# Patient Record
Sex: MALE | Race: WHITE | Employment: OTHER | URBAN - METROPOLITAN AREA
[De-identification: names, ages, dates, MRNs, and addresses within clinical notes are randomized per-mention and may not be internally consistent; named-entity substitution may affect disease eponyms.]

---

## 2017-01-30 RX ORDER — ATORVASTATIN CALCIUM 40 MG/1
TABLET, FILM COATED ORAL
Qty: 30 TABLET | Refills: 2 | Status: SHIPPED | OUTPATIENT
Start: 2017-01-30 | End: 2017-02-14 | Stop reason: SDUPTHER

## 2017-02-14 ENCOUNTER — OFFICE VISIT (OUTPATIENT)
Dept: FAMILY MEDICINE CLINIC | Age: 67
End: 2017-02-14

## 2017-02-14 VITALS
OXYGEN SATURATION: 97 % | BODY MASS INDEX: 32.49 KG/M2 | SYSTOLIC BLOOD PRESSURE: 102 MMHG | WEIGHT: 207 LBS | DIASTOLIC BLOOD PRESSURE: 60 MMHG | HEART RATE: 73 BPM | HEIGHT: 67 IN

## 2017-02-14 DIAGNOSIS — R05.8 ACE-INHIBITOR COUGH: ICD-10-CM

## 2017-02-14 DIAGNOSIS — E11.319 DIABETIC RETINOPATHY ASSOCIATED WITH TYPE 2 DIABETES MELLITUS, MACULAR EDEMA PRESENCE UNSPECIFIED, UNSPECIFIED RETINOPATHY SEVERITY: ICD-10-CM

## 2017-02-14 DIAGNOSIS — E11.8 UNCONTROLLED TYPE 2 DIABETES MELLITUS WITH COMPLICATION, UNSPECIFIED LONG TERM INSULIN USE STATUS: Primary | ICD-10-CM

## 2017-02-14 DIAGNOSIS — E11.65 UNCONTROLLED TYPE 2 DIABETES MELLITUS WITH COMPLICATION, UNSPECIFIED LONG TERM INSULIN USE STATUS: Primary | ICD-10-CM

## 2017-02-14 DIAGNOSIS — T46.4X5A ACE-INHIBITOR COUGH: ICD-10-CM

## 2017-02-14 DIAGNOSIS — Z11.59 NEED FOR HEPATITIS C SCREENING TEST: ICD-10-CM

## 2017-02-14 LAB
A/G RATIO: 2.2 (ref 1.1–2.2)
ALBUMIN SERPL-MCNC: 4.7 G/DL (ref 3.4–5)
ALP BLD-CCNC: 97 U/L (ref 40–129)
ALT SERPL-CCNC: 40 U/L (ref 10–40)
ANION GAP SERPL CALCULATED.3IONS-SCNC: 18 MMOL/L (ref 3–16)
AST SERPL-CCNC: 33 U/L (ref 15–37)
BILIRUB SERPL-MCNC: 0.6 MG/DL (ref 0–1)
BUN BLDV-MCNC: 12 MG/DL (ref 7–20)
CALCIUM SERPL-MCNC: 9.4 MG/DL (ref 8.3–10.6)
CHLORIDE BLD-SCNC: 99 MMOL/L (ref 99–110)
CHOLESTEROL, TOTAL: 197 MG/DL (ref 0–199)
CO2: 25 MMOL/L (ref 21–32)
CREAT SERPL-MCNC: 0.8 MG/DL (ref 0.8–1.3)
CREATININE URINE POCT: 100
GFR AFRICAN AMERICAN: >60
GFR NON-AFRICAN AMERICAN: >60
GLOBULIN: 2.1 G/DL
GLUCOSE BLD-MCNC: 170 MG/DL (ref 70–99)
HBA1C MFR BLD: 9.2 %
HDLC SERPL-MCNC: 56 MG/DL (ref 40–60)
HEPATITIS C ANTIBODY INTERPRETATION: NORMAL
LDL CHOLESTEROL CALCULATED: 104 MG/DL
MICROALBUMIN/CREAT 24H UR: 10 MG/G{CREAT}
POTASSIUM SERPL-SCNC: 4.3 MMOL/L (ref 3.5–5.1)
SODIUM BLD-SCNC: 142 MMOL/L (ref 136–145)
TOTAL PROTEIN: 6.8 G/DL (ref 6.4–8.2)
TRIGL SERPL-MCNC: 187 MG/DL (ref 0–150)
VLDLC SERPL CALC-MCNC: 37 MG/DL

## 2017-02-14 PROCEDURE — 4040F PNEUMOC VAC/ADMIN/RCVD: CPT | Performed by: FAMILY MEDICINE

## 2017-02-14 PROCEDURE — 99214 OFFICE O/P EST MOD 30 MIN: CPT | Performed by: FAMILY MEDICINE

## 2017-02-14 PROCEDURE — 82044 UR ALBUMIN SEMIQUANTITATIVE: CPT | Performed by: FAMILY MEDICINE

## 2017-02-14 PROCEDURE — 3046F HEMOGLOBIN A1C LEVEL >9.0%: CPT | Performed by: FAMILY MEDICINE

## 2017-02-14 PROCEDURE — 83036 HEMOGLOBIN GLYCOSYLATED A1C: CPT | Performed by: FAMILY MEDICINE

## 2017-02-14 PROCEDURE — 1123F ACP DISCUSS/DSCN MKR DOCD: CPT | Performed by: FAMILY MEDICINE

## 2017-02-14 PROCEDURE — 36415 COLL VENOUS BLD VENIPUNCTURE: CPT | Performed by: FAMILY MEDICINE

## 2017-02-14 PROCEDURE — G8427 DOCREV CUR MEDS BY ELIG CLIN: HCPCS | Performed by: FAMILY MEDICINE

## 2017-02-14 PROCEDURE — G8417 CALC BMI ABV UP PARAM F/U: HCPCS | Performed by: FAMILY MEDICINE

## 2017-02-14 PROCEDURE — 3017F COLORECTAL CA SCREEN DOC REV: CPT | Performed by: FAMILY MEDICINE

## 2017-02-14 PROCEDURE — G8484 FLU IMMUNIZE NO ADMIN: HCPCS | Performed by: FAMILY MEDICINE

## 2017-02-14 PROCEDURE — 1036F TOBACCO NON-USER: CPT | Performed by: FAMILY MEDICINE

## 2017-02-14 RX ORDER — FLUTICASONE PROPIONATE 50 MCG
1 SPRAY, SUSPENSION (ML) NASAL DAILY
Qty: 1 BOTTLE | Refills: 3 | Status: SHIPPED | OUTPATIENT
Start: 2017-02-14 | End: 2018-03-01 | Stop reason: SDUPTHER

## 2017-02-14 RX ORDER — LISINOPRIL 10 MG/1
10 TABLET ORAL DAILY
Qty: 90 TABLET | Refills: 3 | Status: SHIPPED | OUTPATIENT
Start: 2017-02-14 | End: 2017-02-14

## 2017-02-14 RX ORDER — TADALAFIL 5 MG/1
5 TABLET ORAL DAILY PRN
Qty: 30 TABLET | Refills: 4 | Status: SHIPPED | OUTPATIENT
Start: 2017-02-14 | End: 2019-10-21 | Stop reason: SDUPTHER

## 2017-02-14 RX ORDER — ATORVASTATIN CALCIUM 40 MG/1
TABLET, FILM COATED ORAL
Qty: 30 TABLET | Refills: 5 | Status: SHIPPED | OUTPATIENT
Start: 2017-02-14 | End: 2017-08-10 | Stop reason: SDUPTHER

## 2017-02-14 RX ORDER — LOSARTAN POTASSIUM 25 MG/1
25 TABLET ORAL DAILY
Qty: 90 TABLET | Refills: 1 | Status: SHIPPED | OUTPATIENT
Start: 2017-02-14 | End: 2017-08-10 | Stop reason: SDUPTHER

## 2017-02-14 RX ORDER — GLIMEPIRIDE 4 MG/1
TABLET ORAL
Qty: 180 TABLET | Refills: 1 | Status: SHIPPED | OUTPATIENT
Start: 2017-02-14 | End: 2017-04-24

## 2017-02-14 ASSESSMENT — ENCOUNTER SYMPTOMS
HEARTBURN: 0
SHORTNESS OF BREATH: 0
WHEEZING: 0
COUGH: 1

## 2017-04-24 ENCOUNTER — TELEPHONE (OUTPATIENT)
Dept: FAMILY MEDICINE CLINIC | Age: 67
End: 2017-04-24

## 2017-04-24 ENCOUNTER — OFFICE VISIT (OUTPATIENT)
Dept: FAMILY MEDICINE CLINIC | Age: 67
End: 2017-04-24

## 2017-04-24 ENCOUNTER — PATIENT MESSAGE (OUTPATIENT)
Dept: FAMILY MEDICINE CLINIC | Age: 67
End: 2017-04-24

## 2017-04-24 VITALS
SYSTOLIC BLOOD PRESSURE: 110 MMHG | OXYGEN SATURATION: 96 % | DIASTOLIC BLOOD PRESSURE: 70 MMHG | HEIGHT: 67 IN | WEIGHT: 208 LBS | BODY MASS INDEX: 32.65 KG/M2 | HEART RATE: 72 BPM

## 2017-04-24 DIAGNOSIS — R97.20 ELEVATED PSA: ICD-10-CM

## 2017-04-24 DIAGNOSIS — Z12.11 COLON CANCER SCREENING: ICD-10-CM

## 2017-04-24 DIAGNOSIS — R05.3 CHRONIC COUGH: ICD-10-CM

## 2017-04-24 LAB — HBA1C MFR BLD: 9.8 %

## 2017-04-24 PROCEDURE — G8427 DOCREV CUR MEDS BY ELIG CLIN: HCPCS | Performed by: FAMILY MEDICINE

## 2017-04-24 PROCEDURE — 4040F PNEUMOC VAC/ADMIN/RCVD: CPT | Performed by: FAMILY MEDICINE

## 2017-04-24 PROCEDURE — 83036 HEMOGLOBIN GLYCOSYLATED A1C: CPT | Performed by: FAMILY MEDICINE

## 2017-04-24 PROCEDURE — 3046F HEMOGLOBIN A1C LEVEL >9.0%: CPT | Performed by: FAMILY MEDICINE

## 2017-04-24 PROCEDURE — 3017F COLORECTAL CA SCREEN DOC REV: CPT | Performed by: FAMILY MEDICINE

## 2017-04-24 PROCEDURE — 1123F ACP DISCUSS/DSCN MKR DOCD: CPT | Performed by: FAMILY MEDICINE

## 2017-04-24 PROCEDURE — G8417 CALC BMI ABV UP PARAM F/U: HCPCS | Performed by: FAMILY MEDICINE

## 2017-04-24 PROCEDURE — 1036F TOBACCO NON-USER: CPT | Performed by: FAMILY MEDICINE

## 2017-04-24 PROCEDURE — 99214 OFFICE O/P EST MOD 30 MIN: CPT | Performed by: FAMILY MEDICINE

## 2017-04-24 RX ORDER — GLUCOSAMINE HCL/CHONDROITIN SU 500-400 MG
CAPSULE ORAL
Qty: 100 STRIP | Refills: 3 | Status: SHIPPED | OUTPATIENT
Start: 2017-04-24 | End: 2017-04-24 | Stop reason: SDUPTHER

## 2017-04-24 RX ORDER — GLUCOSAMINE HCL/CHONDROITIN SU 500-400 MG
CAPSULE ORAL
Qty: 150 STRIP | Refills: 3 | Status: SHIPPED | OUTPATIENT
Start: 2017-04-24 | End: 2018-11-15 | Stop reason: SDUPTHER

## 2017-04-24 RX ORDER — LANCETS 30 GAUGE
1 EACH MISCELLANEOUS 4 TIMES DAILY
Qty: 150 EACH | Refills: 3 | Status: SHIPPED | OUTPATIENT
Start: 2017-04-24 | End: 2018-11-15 | Stop reason: SDUPTHER

## 2017-04-24 RX ORDER — LANCETS 30 GAUGE
EACH MISCELLANEOUS
Qty: 100 EACH | Refills: 3 | Status: SHIPPED | OUTPATIENT
Start: 2017-04-24 | End: 2017-04-24 | Stop reason: SDUPTHER

## 2017-04-24 ASSESSMENT — ENCOUNTER SYMPTOMS
VISUAL CHANGE: 0
COUGH: 1
SHORTNESS OF BREATH: 0

## 2017-04-26 LAB
PROSTATE SPECIFIC ANTIGEN FREE: 1.3 NG/ML
PROSTATE SPECIFIC ANTIGEN PERCENT FREE: 18 %
PROSTATE SPECIFIC ANTIGEN: 7.4 NG/ML (ref 0–4)

## 2017-04-29 LAB — ALLERGEN SEE NOTE: NORMAL

## 2017-04-30 LAB
2000687N OAK TREE IGE: 0.45 KU/L
ALLERGEN ASPERGILLUS ALTERNATA IGE: <0.1 KU/L
ALLERGEN ASPERGILLUS FUMIGATUS IGE: <0.1 KU/L
ALLERGEN BERMUDA GRASS IGE: 0.43 KU/L
ALLERGEN BIRCH IGE: 0.31 KU/L
ALLERGEN CAT DANDER IGE: <0.1 KU/L
ALLERGEN COMMON SHORT RAGWEED IGE: 0.39 KU/L
ALLERGEN COTTONWOOD: 0.5 KU/L
ALLERGEN COW MILK IGE: <0.1 KU/L
ALLERGEN DOG DANDER IGE: <0.1 KU/L
ALLERGEN ELM IGE: 0.39 KU/L
ALLERGEN FUNGI/MOLD M.RACEMOSUS IGE: 0.19 KU/L
ALLERGEN GERMAN COCKROACH IGE: 0.22 KU/L
ALLERGEN HORMODENDRUM HORDEI IGE: <0.1 KU/L
ALLERGEN MAPLE/BOX ELDER IGE: 0.45 KU/L
ALLERGEN MITE DUST FARINAE IGE: <0.1 KU/L
ALLERGEN MITE DUST PTERONYSSINUS IGE: <0.1 KU/L
ALLERGEN MOUNTAIN CEDAR: 0.33 KU/L
ALLERGEN MOUSE EPITHELIA IGE: <0.1 KU/L
ALLERGEN PEANUT (F13) IGE: 0.36 KU/L
ALLERGEN PECAN TREE IGE: 0.31 KU/L
ALLERGEN PENICILLIUM NOTATUM: <0.1 KU/L
ALLERGEN ROUGH PIGWEED (W14) IGE: 0.3 KU/L
ALLERGEN RUSSIAN THISTLE IGE: 0.39 KU/L
ALLERGEN SHEEP SORREL (W18) IGE: 0.38 KU/L
ALLERGEN TIMOTHY GRASS: 0.43 KU/L
ALLERGEN TREE SYCAMORE: 0.42 KU/L
ALLERGEN WALNUT TREE IGE: 0.43 KU/L
ALLERGEN WHITE MULBERRY TREE, IGE: 0.31 KU/L
ALLERGEN, TREE, WHITE ASH IGE: 0.44 KU/L
IGE: 27 KU/L

## 2017-05-14 RX ORDER — RANITIDINE 150 MG/1
TABLET ORAL
Qty: 180 TABLET | Refills: 3 | Status: SHIPPED | OUTPATIENT
Start: 2017-05-14 | End: 2018-05-24 | Stop reason: SDUPTHER

## 2017-06-07 ENCOUNTER — PATIENT MESSAGE (OUTPATIENT)
Dept: FAMILY MEDICINE CLINIC | Age: 67
End: 2017-06-07

## 2017-06-07 RX ORDER — BLOOD-GLUCOSE METER
1 KIT MISCELLANEOUS DAILY
Qty: 1 KIT | Refills: 0 | Status: SHIPPED | OUTPATIENT
Start: 2017-06-07 | End: 2021-04-29 | Stop reason: SDUPTHER

## 2017-07-18 ENCOUNTER — OFFICE VISIT (OUTPATIENT)
Dept: ENT CLINIC | Age: 67
End: 2017-07-18

## 2017-07-18 VITALS
DIASTOLIC BLOOD PRESSURE: 74 MMHG | BODY MASS INDEX: 34.59 KG/M2 | WEIGHT: 220.4 LBS | SYSTOLIC BLOOD PRESSURE: 132 MMHG | HEIGHT: 67 IN | TEMPERATURE: 98.3 F

## 2017-07-18 DIAGNOSIS — H90.5 HEARING LOSS, SENSORINEURAL: Primary | ICD-10-CM

## 2017-07-18 PROCEDURE — 1036F TOBACCO NON-USER: CPT | Performed by: OTOLARYNGOLOGY

## 2017-07-18 PROCEDURE — 1123F ACP DISCUSS/DSCN MKR DOCD: CPT | Performed by: OTOLARYNGOLOGY

## 2017-07-18 PROCEDURE — G8427 DOCREV CUR MEDS BY ELIG CLIN: HCPCS | Performed by: OTOLARYNGOLOGY

## 2017-07-18 PROCEDURE — 4040F PNEUMOC VAC/ADMIN/RCVD: CPT | Performed by: OTOLARYNGOLOGY

## 2017-07-18 PROCEDURE — 99203 OFFICE O/P NEW LOW 30 MIN: CPT | Performed by: OTOLARYNGOLOGY

## 2017-07-18 PROCEDURE — 3017F COLORECTAL CA SCREEN DOC REV: CPT | Performed by: OTOLARYNGOLOGY

## 2017-07-18 PROCEDURE — G8417 CALC BMI ABV UP PARAM F/U: HCPCS | Performed by: OTOLARYNGOLOGY

## 2017-08-10 ENCOUNTER — OFFICE VISIT (OUTPATIENT)
Dept: FAMILY MEDICINE CLINIC | Age: 67
End: 2017-08-10

## 2017-08-10 VITALS
SYSTOLIC BLOOD PRESSURE: 120 MMHG | HEART RATE: 69 BPM | BODY MASS INDEX: 33.12 KG/M2 | HEIGHT: 67 IN | OXYGEN SATURATION: 97 % | DIASTOLIC BLOOD PRESSURE: 80 MMHG | WEIGHT: 211 LBS

## 2017-08-10 DIAGNOSIS — E11.65 UNCONTROLLED TYPE 2 DIABETES MELLITUS WITH COMPLICATION, UNSPECIFIED LONG TERM INSULIN USE STATUS: Primary | ICD-10-CM

## 2017-08-10 DIAGNOSIS — Z12.11 SCREENING FOR COLON CANCER: ICD-10-CM

## 2017-08-10 DIAGNOSIS — E11.8 UNCONTROLLED TYPE 2 DIABETES MELLITUS WITH COMPLICATION, UNSPECIFIED LONG TERM INSULIN USE STATUS: Primary | ICD-10-CM

## 2017-08-10 LAB — HBA1C MFR BLD: 9 %

## 2017-08-10 PROCEDURE — 3046F HEMOGLOBIN A1C LEVEL >9.0%: CPT | Performed by: FAMILY MEDICINE

## 2017-08-10 PROCEDURE — 3017F COLORECTAL CA SCREEN DOC REV: CPT | Performed by: FAMILY MEDICINE

## 2017-08-10 PROCEDURE — 1036F TOBACCO NON-USER: CPT | Performed by: FAMILY MEDICINE

## 2017-08-10 PROCEDURE — 4040F PNEUMOC VAC/ADMIN/RCVD: CPT | Performed by: FAMILY MEDICINE

## 2017-08-10 PROCEDURE — 99214 OFFICE O/P EST MOD 30 MIN: CPT | Performed by: FAMILY MEDICINE

## 2017-08-10 PROCEDURE — 83036 HEMOGLOBIN GLYCOSYLATED A1C: CPT | Performed by: FAMILY MEDICINE

## 2017-08-10 PROCEDURE — G8417 CALC BMI ABV UP PARAM F/U: HCPCS | Performed by: FAMILY MEDICINE

## 2017-08-10 PROCEDURE — 1123F ACP DISCUSS/DSCN MKR DOCD: CPT | Performed by: FAMILY MEDICINE

## 2017-08-10 PROCEDURE — G8427 DOCREV CUR MEDS BY ELIG CLIN: HCPCS | Performed by: FAMILY MEDICINE

## 2017-08-10 RX ORDER — LOSARTAN POTASSIUM 25 MG/1
25 TABLET ORAL DAILY
Qty: 90 TABLET | Refills: 1 | Status: SHIPPED | OUTPATIENT
Start: 2017-08-10 | End: 2018-03-01 | Stop reason: SDUPTHER

## 2017-08-10 RX ORDER — ACARBOSE 100 MG/1
100 TABLET ORAL
Qty: 90 TABLET | Refills: 3 | Status: SHIPPED | OUTPATIENT
Start: 2017-08-10 | End: 2018-03-01 | Stop reason: SDUPTHER

## 2017-08-10 RX ORDER — ATORVASTATIN CALCIUM 40 MG/1
TABLET, FILM COATED ORAL
Qty: 90 TABLET | Refills: 1 | Status: SHIPPED | OUTPATIENT
Start: 2017-08-10 | End: 2018-02-23 | Stop reason: SDUPTHER

## 2017-08-10 RX ORDER — ATORVASTATIN CALCIUM 40 MG/1
TABLET, FILM COATED ORAL
Qty: 90 TABLET | Refills: 1 | Status: SHIPPED | OUTPATIENT
Start: 2017-08-10 | End: 2017-08-10 | Stop reason: SDUPTHER

## 2017-08-10 RX ORDER — GLIMEPIRIDE 4 MG/1
TABLET ORAL
Qty: 180 TABLET | Refills: 1 | Status: SHIPPED | OUTPATIENT
Start: 2017-08-10 | End: 2018-05-24 | Stop reason: SDUPTHER

## 2017-08-10 ASSESSMENT — ENCOUNTER SYMPTOMS: NAUSEA: 0

## 2017-08-22 DIAGNOSIS — Z12.11 SCREENING FOR COLON CANCER: Primary | ICD-10-CM

## 2017-08-22 LAB
CONTROL: PRESENT
HEMOCCULT STL QL: NEGATIVE

## 2017-08-22 PROCEDURE — 82274 ASSAY TEST FOR BLOOD FECAL: CPT | Performed by: FAMILY MEDICINE

## 2017-08-23 ENCOUNTER — TELEPHONE (OUTPATIENT)
Dept: FAMILY MEDICINE CLINIC | Age: 67
End: 2017-08-23

## 2017-08-23 DIAGNOSIS — M54.5 CHRONIC MIDLINE LOW BACK PAIN, WITH SCIATICA PRESENCE UNSPECIFIED: Primary | ICD-10-CM

## 2017-08-23 DIAGNOSIS — G89.29 CHRONIC MIDLINE LOW BACK PAIN, WITH SCIATICA PRESENCE UNSPECIFIED: Primary | ICD-10-CM

## 2017-08-24 ENCOUNTER — PATIENT MESSAGE (OUTPATIENT)
Dept: FAMILY MEDICINE CLINIC | Age: 67
End: 2017-08-24

## 2017-08-24 DIAGNOSIS — G89.29 CHRONIC MIDLINE LOW BACK PAIN, WITH SCIATICA PRESENCE UNSPECIFIED: Primary | ICD-10-CM

## 2017-08-24 DIAGNOSIS — M54.5 CHRONIC MIDLINE LOW BACK PAIN, WITH SCIATICA PRESENCE UNSPECIFIED: Primary | ICD-10-CM

## 2017-08-31 ENCOUNTER — TELEPHONE (OUTPATIENT)
Dept: FAMILY MEDICINE CLINIC | Age: 67
End: 2017-08-31

## 2017-09-05 ENCOUNTER — OFFICE VISIT (OUTPATIENT)
Dept: FAMILY MEDICINE CLINIC | Age: 67
End: 2017-09-05

## 2017-09-05 ENCOUNTER — NURSE ONLY (OUTPATIENT)
Dept: FAMILY MEDICINE CLINIC | Age: 67
End: 2017-09-05

## 2017-09-05 DIAGNOSIS — R68.89 FLU-LIKE SYMPTOMS: Primary | ICD-10-CM

## 2017-09-05 DIAGNOSIS — J06.9 VIRAL URI: ICD-10-CM

## 2017-09-05 LAB
INFLUENZA A ANTIBODY: NORMAL
INFLUENZA B ANTIBODY: NORMAL

## 2017-09-05 PROCEDURE — 3017F COLORECTAL CA SCREEN DOC REV: CPT | Performed by: FAMILY MEDICINE

## 2017-09-05 PROCEDURE — G8417 CALC BMI ABV UP PARAM F/U: HCPCS | Performed by: FAMILY MEDICINE

## 2017-09-05 PROCEDURE — 4040F PNEUMOC VAC/ADMIN/RCVD: CPT | Performed by: FAMILY MEDICINE

## 2017-09-05 PROCEDURE — 87804 INFLUENZA ASSAY W/OPTIC: CPT | Performed by: FAMILY MEDICINE

## 2017-09-05 PROCEDURE — G8427 DOCREV CUR MEDS BY ELIG CLIN: HCPCS | Performed by: FAMILY MEDICINE

## 2017-09-05 PROCEDURE — 99212 OFFICE O/P EST SF 10 MIN: CPT | Performed by: FAMILY MEDICINE

## 2017-09-05 PROCEDURE — 1123F ACP DISCUSS/DSCN MKR DOCD: CPT | Performed by: FAMILY MEDICINE

## 2017-09-05 PROCEDURE — 1036F TOBACCO NON-USER: CPT | Performed by: FAMILY MEDICINE

## 2017-09-05 ASSESSMENT — ENCOUNTER SYMPTOMS
COUGH: 1
SORE THROAT: 1

## 2017-10-30 ENCOUNTER — OFFICE VISIT (OUTPATIENT)
Dept: DERMATOLOGY | Age: 67
End: 2017-10-30

## 2017-10-30 DIAGNOSIS — L57.0 ACTINIC KERATOSES: Primary | ICD-10-CM

## 2017-10-30 DIAGNOSIS — Z12.83 SCREENING EXAM FOR SKIN CANCER: ICD-10-CM

## 2017-10-30 PROCEDURE — 1123F ACP DISCUSS/DSCN MKR DOCD: CPT | Performed by: DERMATOLOGY

## 2017-10-30 PROCEDURE — 17000 DESTRUCT PREMALG LESION: CPT | Performed by: DERMATOLOGY

## 2017-10-30 PROCEDURE — 99213 OFFICE O/P EST LOW 20 MIN: CPT | Performed by: DERMATOLOGY

## 2017-10-30 PROCEDURE — G8484 FLU IMMUNIZE NO ADMIN: HCPCS | Performed by: DERMATOLOGY

## 2017-10-30 PROCEDURE — G8417 CALC BMI ABV UP PARAM F/U: HCPCS | Performed by: DERMATOLOGY

## 2017-10-30 PROCEDURE — 4040F PNEUMOC VAC/ADMIN/RCVD: CPT | Performed by: DERMATOLOGY

## 2017-10-30 PROCEDURE — G8427 DOCREV CUR MEDS BY ELIG CLIN: HCPCS | Performed by: DERMATOLOGY

## 2017-10-30 PROCEDURE — 1036F TOBACCO NON-USER: CPT | Performed by: DERMATOLOGY

## 2017-10-30 PROCEDURE — 17003 DESTRUCT PREMALG LES 2-14: CPT | Performed by: DERMATOLOGY

## 2017-10-30 PROCEDURE — 3017F COLORECTAL CA SCREEN DOC REV: CPT | Performed by: DERMATOLOGY

## 2017-10-30 NOTE — PROGRESS NOTES
[facility]) Dermatology  Gudelia Steve Enriquebrittany      Noemi Lopez  1950    78 y.o. male     Date of Visit: 10/30/2017    Last Visit: 1yr    Chief Complaint: Skin check    History of Present Illness:  1. Here for skin check. History of actinic keratoses s/p cryotherapy. Reports rough lesions on temples. -Spends a lot of time in sun. Wears cap more regularly now. Does not use sunscreen. Review of Systems:  Constitutional: Reports general sense of well-being. Skin: No interval severe sunburns. Allergies: Reviewed and updated. Past Medical History, Surgical History, Medications and Allergies reviewed.      Past Medical History:   Diagnosis Date    Erectile dysfunction 6/6/2011    GERD (gastroesophageal reflux disease)     Hyperlipidemia     Low back pain 6/6/2011    Type II or unspecified type diabetes mellitus without mention of complication, not stated as uncontrolled      Past Surgical History:   Procedure Laterality Date    KNEE GANGLION SURGERY      left wrist and right anle    VASECTOMY         Allergies   Allergen Reactions    Lisinopril      cough    Other      INOVAR    Toujeo Solostar [Insulin Glargine]      Dizziness, weight gain     Outpatient Prescriptions Marked as Taking for the 10/30/17 encounter (Office Visit) with Gudelia Steve MD   Medication Sig Dispense Refill    metFORMIN (GLUCOPHAGE) 1000 MG tablet Take 1 tablet by mouth 2 times daily (with meals) 180 tablet 1    glimepiride (AMARYL) 4 MG tablet TAKE ONE TABLET BY MOUTH TWICE DAILY BEFORE MEAL(S) FOR 90 DAYS 180 tablet 1    acarbose (PRECOSE) 100 MG tablet Take 1 tablet by mouth 3 times daily (with meals) 90 tablet 3    losartan (COZAAR) 25 MG tablet Take 1 tablet by mouth daily 90 tablet 1    atorvastatin (LIPITOR) 40 MG tablet TAKE 1 TABLET BY MOUTH ONE TIME A DAY 90 tablet 1    Blood Glucose Monitoring Suppl (FREESTYLE FREEDOM LITE) W/DEVICE KIT 1 kit by Does not apply route daily 1 kit 0    ranitidine (ZANTAC) 150 MG tablet TAKE ONE TABLET BY MOUTH TWICE DAILY 180 tablet 3    Insulin Pen Needle (CAREFINE PEN NEEDLES) 32G X 4 MM MISC 1 each by Does not apply route daily 100 each 3    Glucose Blood (BLOOD GLUCOSE TEST STRIPS) STRP Dx 250.02, Test blood sugar 4 times daily. 150 strip 3    Lancets MISC 1 each by Does not apply route 4 times daily Dx 250.02, use to test blood glucose daily 150 each 3    fluticasone (FLONASE) 50 MCG/ACT nasal spray 1 spray by Nasal route daily 1 Bottle 3    tadalafil (CIALIS) 5 MG tablet Take 1 tablet by mouth daily as needed for Erectile Dysfunction 30 tablet 4    albuterol sulfate (PROAIR RESPICLICK) 545 (90 BASE) MCG/ACT aerosol powder inhalation Inhale 1-2 puffs into the lungs every 4 hours as needed for Wheezing or Shortness of Breath 1 Inhaler 0    glucose monitoring kit (FREESTYLE) monitoring kit 1 kit by Does not apply route daily as needed. 1 kit 0    aspirin 81 MG chewable tablet Take 81 mg by mouth daily. OTC       fish oil-omega-3 fatty acids 1000 MG capsule Take 2 g by mouth daily. 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 Physical Examination     The following were examined and determined to be normal: Psych/Neuro, Scalp/hair, Conjunctivae/eyelids, Gums/teeth/lips, Neck, Breast/axilla/chest, Abdomen, Back, RUE, RLE, LLE, Nails/digits and Genitalia/groin/buttocks. The following were examined and determined to be abnormal: Head/face and LUE. -General: Well-appearing, NAD  1. L helix 1, L forearm 1, L 2 and R 3 temples - ill-defined irregularly-shaped roughly-scaling thin pink macules/papules     Assessment and Plan     1. Actinic keratosis(es)  -Edu re: relationship with chronic cumulative sun exposure, low premalignant potential.   -7 lesion(s) treated w/ liquid nitrogen x 2 cycles - L helix 1, L forearm 1, L 2 and R 3 temples. Edu re: risk of blister formation, discomfort, scar, hypopigmentation.  Discussed

## 2017-11-01 ENCOUNTER — PATIENT MESSAGE (OUTPATIENT)
Dept: FAMILY MEDICINE CLINIC | Age: 67
End: 2017-11-01

## 2017-11-06 ENCOUNTER — TELEPHONE (OUTPATIENT)
Dept: DERMATOLOGY | Age: 67
End: 2017-11-06

## 2017-11-06 NOTE — TELEPHONE ENCOUNTER
Left message on home and mobile phone voicemail to have patient return call to office. Also sent [employer] message with info.

## 2018-02-23 RX ORDER — ATORVASTATIN CALCIUM 40 MG/1
TABLET, FILM COATED ORAL
Qty: 90 TABLET | Refills: 3 | Status: SHIPPED | OUTPATIENT
Start: 2018-02-23 | End: 2018-09-24 | Stop reason: SDUPTHER

## 2018-05-24 ENCOUNTER — PATIENT MESSAGE (OUTPATIENT)
Dept: FAMILY MEDICINE CLINIC | Age: 68
End: 2018-05-24

## 2018-05-24 ENCOUNTER — OFFICE VISIT (OUTPATIENT)
Dept: FAMILY MEDICINE CLINIC | Age: 68
End: 2018-05-24

## 2018-05-24 VITALS
OXYGEN SATURATION: 96 % | DIASTOLIC BLOOD PRESSURE: 70 MMHG | BODY MASS INDEX: 33.27 KG/M2 | HEIGHT: 67 IN | HEART RATE: 71 BPM | SYSTOLIC BLOOD PRESSURE: 122 MMHG | WEIGHT: 212 LBS

## 2018-05-24 DIAGNOSIS — E11.65 UNCONTROLLED TYPE 2 DIABETES MELLITUS WITH COMPLICATION, UNSPECIFIED LONG TERM INSULIN USE STATUS: ICD-10-CM

## 2018-05-24 DIAGNOSIS — E11.3299 NONPROLIFERATIVE DIABETIC RETINOPATHY (HCC): ICD-10-CM

## 2018-05-24 DIAGNOSIS — E11.8 UNCONTROLLED TYPE 2 DIABETES MELLITUS WITH COMPLICATION, UNSPECIFIED LONG TERM INSULIN USE STATUS: ICD-10-CM

## 2018-05-24 DIAGNOSIS — N50.89 SCROTAL MASS: Primary | ICD-10-CM

## 2018-05-24 DIAGNOSIS — Z12.5 SCREENING PSA (PROSTATE SPECIFIC ANTIGEN): ICD-10-CM

## 2018-05-24 DIAGNOSIS — Z23 NEED FOR PROPHYLACTIC VACCINATION AND INOCULATION AGAINST VARICELLA: ICD-10-CM

## 2018-05-24 LAB
A/G RATIO: 1.9 (ref 1.1–2.2)
ALBUMIN SERPL-MCNC: 4.6 G/DL (ref 3.4–5)
ALP BLD-CCNC: 111 U/L (ref 40–129)
ALT SERPL-CCNC: 45 U/L (ref 10–40)
ANION GAP SERPL CALCULATED.3IONS-SCNC: 15 MMOL/L (ref 3–16)
AST SERPL-CCNC: 24 U/L (ref 15–37)
BILIRUB SERPL-MCNC: 0.6 MG/DL (ref 0–1)
BUN BLDV-MCNC: 15 MG/DL (ref 7–20)
CALCIUM SERPL-MCNC: 9.7 MG/DL (ref 8.3–10.6)
CHLORIDE BLD-SCNC: 96 MMOL/L (ref 99–110)
CHOLESTEROL, TOTAL: 198 MG/DL (ref 0–199)
CO2: 28 MMOL/L (ref 21–32)
CREAT SERPL-MCNC: 0.9 MG/DL (ref 0.8–1.3)
CREATININE URINE POCT: 100
GFR AFRICAN AMERICAN: >60
GFR NON-AFRICAN AMERICAN: >60
GLOBULIN: 2.4 G/DL
GLUCOSE BLD-MCNC: 331 MG/DL (ref 70–99)
HBA1C MFR BLD: 9.5 %
HDLC SERPL-MCNC: 50 MG/DL (ref 40–60)
LDL CHOLESTEROL CALCULATED: 113 MG/DL
MICROALBUMIN/CREAT 24H UR: 80 MG/G{CREAT}
MICROALBUMIN/CREAT UR-RTO: NORMAL
POTASSIUM SERPL-SCNC: 4.9 MMOL/L (ref 3.5–5.1)
PROSTATE SPECIFIC ANTIGEN: 8.16 NG/ML (ref 0–4)
SODIUM BLD-SCNC: 139 MMOL/L (ref 136–145)
TOTAL PROTEIN: 7 G/DL (ref 6.4–8.2)
TRIGL SERPL-MCNC: 174 MG/DL (ref 0–150)
VLDLC SERPL CALC-MCNC: 35 MG/DL

## 2018-05-24 PROCEDURE — 4040F PNEUMOC VAC/ADMIN/RCVD: CPT | Performed by: FAMILY MEDICINE

## 2018-05-24 PROCEDURE — 3046F HEMOGLOBIN A1C LEVEL >9.0%: CPT | Performed by: FAMILY MEDICINE

## 2018-05-24 PROCEDURE — 3017F COLORECTAL CA SCREEN DOC REV: CPT | Performed by: FAMILY MEDICINE

## 2018-05-24 PROCEDURE — 99214 OFFICE O/P EST MOD 30 MIN: CPT | Performed by: FAMILY MEDICINE

## 2018-05-24 PROCEDURE — 1036F TOBACCO NON-USER: CPT | Performed by: FAMILY MEDICINE

## 2018-05-24 PROCEDURE — 1123F ACP DISCUSS/DSCN MKR DOCD: CPT | Performed by: FAMILY MEDICINE

## 2018-05-24 PROCEDURE — G8427 DOCREV CUR MEDS BY ELIG CLIN: HCPCS | Performed by: FAMILY MEDICINE

## 2018-05-24 PROCEDURE — G8417 CALC BMI ABV UP PARAM F/U: HCPCS | Performed by: FAMILY MEDICINE

## 2018-05-24 PROCEDURE — 82044 UR ALBUMIN SEMIQUANTITATIVE: CPT | Performed by: FAMILY MEDICINE

## 2018-05-24 PROCEDURE — 83036 HEMOGLOBIN GLYCOSYLATED A1C: CPT | Performed by: FAMILY MEDICINE

## 2018-05-24 PROCEDURE — 2022F DILAT RTA XM EVC RTNOPTHY: CPT | Performed by: FAMILY MEDICINE

## 2018-05-24 RX ORDER — GLIMEPIRIDE 4 MG/1
TABLET ORAL
Qty: 180 TABLET | Refills: 1 | Status: SHIPPED | OUTPATIENT
Start: 2018-05-24 | End: 2018-09-24 | Stop reason: SDUPTHER

## 2018-05-24 RX ORDER — RANITIDINE 150 MG/1
TABLET ORAL
Qty: 180 TABLET | Refills: 3 | Status: SHIPPED | OUTPATIENT
Start: 2018-05-24 | End: 2018-09-24 | Stop reason: SDUPTHER

## 2018-05-25 RX ORDER — ACARBOSE 100 MG/1
TABLET ORAL
Qty: 90 TABLET | Refills: 3 | Status: SHIPPED | OUTPATIENT
Start: 2018-05-25 | End: 2018-09-24 | Stop reason: SDUPTHER

## 2018-07-24 RX ORDER — SILDENAFIL CITRATE 20 MG/1
60 TABLET ORAL PRN
Qty: 30 TABLET | Refills: 3 | Status: SHIPPED | OUTPATIENT
Start: 2018-07-24 | End: 2019-05-14 | Stop reason: SDUPTHER

## 2018-09-24 ENCOUNTER — OFFICE VISIT (OUTPATIENT)
Dept: FAMILY MEDICINE CLINIC | Age: 68
End: 2018-09-24
Payer: MEDICARE

## 2018-09-24 VITALS
DIASTOLIC BLOOD PRESSURE: 72 MMHG | HEIGHT: 67 IN | OXYGEN SATURATION: 98 % | SYSTOLIC BLOOD PRESSURE: 136 MMHG | HEART RATE: 68 BPM | BODY MASS INDEX: 33.43 KG/M2 | WEIGHT: 213 LBS

## 2018-09-24 DIAGNOSIS — E11.8 UNCONTROLLED TYPE 2 DIABETES MELLITUS WITH COMPLICATION, UNSPECIFIED WHETHER LONG TERM INSULIN USE (HCC): Primary | ICD-10-CM

## 2018-09-24 DIAGNOSIS — R97.20 ELEVATED PSA: ICD-10-CM

## 2018-09-24 DIAGNOSIS — J06.9 VIRAL URI: ICD-10-CM

## 2018-09-24 DIAGNOSIS — Z12.11 SCREENING FOR COLON CANCER: ICD-10-CM

## 2018-09-24 DIAGNOSIS — E11.65 UNCONTROLLED TYPE 2 DIABETES MELLITUS WITH COMPLICATION, UNSPECIFIED WHETHER LONG TERM INSULIN USE (HCC): Primary | ICD-10-CM

## 2018-09-24 LAB
HBA1C MFR BLD: 10.8 %
VITAMIN B-12: 868 PG/ML (ref 211–911)

## 2018-09-24 PROCEDURE — 99214 OFFICE O/P EST MOD 30 MIN: CPT | Performed by: FAMILY MEDICINE

## 2018-09-24 PROCEDURE — 83036 HEMOGLOBIN GLYCOSYLATED A1C: CPT | Performed by: FAMILY MEDICINE

## 2018-09-24 PROCEDURE — 36415 COLL VENOUS BLD VENIPUNCTURE: CPT | Performed by: FAMILY MEDICINE

## 2018-09-24 RX ORDER — LOSARTAN POTASSIUM 25 MG/1
TABLET ORAL
Qty: 90 TABLET | Refills: 3 | Status: SHIPPED | OUTPATIENT
Start: 2018-09-24 | End: 2019-10-13 | Stop reason: SDUPTHER

## 2018-09-24 RX ORDER — ATORVASTATIN CALCIUM 40 MG/1
TABLET, FILM COATED ORAL
Qty: 90 TABLET | Refills: 3 | Status: SHIPPED | OUTPATIENT
Start: 2018-09-24 | End: 2019-10-21 | Stop reason: SDUPTHER

## 2018-09-24 RX ORDER — METFORMIN HYDROCHLORIDE 750 MG/1
1500 TABLET, EXTENDED RELEASE ORAL
Qty: 180 TABLET | Refills: 3 | Status: SHIPPED | OUTPATIENT
Start: 2018-09-24 | End: 2019-02-26

## 2018-09-24 RX ORDER — RANITIDINE 150 MG/1
TABLET ORAL
Qty: 180 TABLET | Refills: 3 | Status: SHIPPED | OUTPATIENT
Start: 2018-09-24 | End: 2019-10-15 | Stop reason: SDUPTHER

## 2018-09-24 RX ORDER — GLIMEPIRIDE 4 MG/1
TABLET ORAL
Qty: 180 TABLET | Refills: 1 | Status: SHIPPED | OUTPATIENT
Start: 2018-09-24 | End: 2019-02-26

## 2018-09-24 RX ORDER — ACARBOSE 100 MG/1
TABLET ORAL
Qty: 90 TABLET | Refills: 3 | Status: SHIPPED | OUTPATIENT
Start: 2018-09-24 | End: 2019-05-14 | Stop reason: SDUPTHER

## 2018-09-24 ASSESSMENT — PATIENT HEALTH QUESTIONNAIRE - PHQ9
SUM OF ALL RESPONSES TO PHQ QUESTIONS 1-9: 0
SUM OF ALL RESPONSES TO PHQ QUESTIONS 1-9: 0
SUM OF ALL RESPONSES TO PHQ9 QUESTIONS 1 & 2: 0
2. FEELING DOWN, DEPRESSED OR HOPELESS: 0
1. LITTLE INTEREST OR PLEASURE IN DOING THINGS: 0

## 2018-09-25 LAB — PROSTATE SPECIFIC ANTIGEN: 9.34 NG/ML (ref 0–4)

## 2018-09-28 ENCOUNTER — TELEPHONE (OUTPATIENT)
Dept: FAMILY MEDICINE CLINIC | Age: 68
End: 2018-09-28

## 2018-09-30 ASSESSMENT — ENCOUNTER SYMPTOMS
SORE THROAT: 0
COUGH: 1
BLURRED VISION: 0
VISUAL CHANGE: 0
SINUS PAIN: 0

## 2018-09-30 NOTE — PROGRESS NOTES
2018     Jamila Singer (:  1950) is a 75 y.o. male, here for evaluation of the following medical concerns:    Chief complaint: Patient presents with:  Diabetes     Past medical, surgical, family and social history, as well as current medications and allergies, were reviewed and updated with the patient. Diabetes   There are no hypoglycemic associated symptoms. Pertinent negatives for diabetes include no blurred vision, no chest pain, no foot paresthesias, no polydipsia, no polyuria and no visual change. There are no hypoglycemic complications. His weight is stable. When asked about meal planning, he reported none. He never participates in exercise. An ACE inhibitor/angiotensin II receptor blocker is being taken. URI    This is a new problem. The current episode started in the past 7 days (x 5 days). The problem has been gradually improving. There has been no fever. Associated symptoms include congestion and coughing. Pertinent negatives include no chest pain, ear pain, sinus pain or sore throat. He has tried nothing for the symptoms. Diabetes Mellitus Type 2: Current symptoms/problems include none. Medication compliance:  noncompliant: stops most meds due to either cost or side effects  Diabetic diet compliance:  noncompliant: doesn't limit diet,  Weight trend: stable  Current exercise: no regular exercise  Barriers: lack of motivation and time constraints    Home blood sugar records: patient does not test  Any episodes of hypoglycemia? no  Eye exam current (within one year): yes   reports that he has never smoked. He has never used smokeless tobacco.   Daily Aspirin?  Yes    Lab Results   Component Value Date    LABA1C 10.8 2018    LABA1C 9.5 2018    LABA1C 9.0 08/10/2017     Lab Results   Component Value Date    CREATININE 0.9 2018     Lab Results   Component Value Date    ALT 45 (H) 2018    AST 24 2018     Lab Results   Component Value Date    CHOL 198 05/24/2018    TRIG 174 (H) 05/24/2018    HDL 50 05/24/2018    LDLCALC 113 (H) 05/24/2018        Patient has had elevated PSA. He would like to have it rechecked. Denies difficulty urinating right now. Review of Systems   Constitutional: Negative for fever. HENT: Positive for congestion. Negative for ear pain, sinus pain and sore throat. Eyes: Negative for blurred vision. Respiratory: Positive for cough. Cardiovascular: Negative for chest pain. Endocrine: Negative for polydipsia and polyuria. Hematological: Negative for adenopathy. Prior to Visit Medications    Medication Sig Taking?  Authorizing Provider   acarbose (PRECOSE) 100 MG tablet TAKE ONE TABLET BY MOUTH THREE TIMES DAILY WITH MEALS Yes Leann Garcia MD   atorvastatin (LIPITOR) 40 MG tablet TAKE 1 TABLET BY MOUTH ONE TIME A DAY Yes Leann Garcia MD   etodolac (LODINE) 300 MG capsule Take 1 capsule by mouth every 8 hours Yes Leann Garcia MD   glimepiride (AMARYL) 4 MG tablet TAKE ONE TABLET BY MOUTH TWICE DAILY BEFORE MEAL(S) FOR 90 DAYS Yes Leann Garcia MD   losartan (COZAAR) 25 MG tablet TAKE 1 TABLET BY MOUTH ONCE DAILY Yes Leann Garcia MD   ranitidine (ZANTAC) 150 MG tablet TAKE ONE TABLET BY MOUTH TWICE DAILY Yes Leann Garcia MD   metFORMIN (GLUCOPHAGE-XR) 750 MG extended release tablet Take 2 tablets by mouth daily (with breakfast) Yes Leann Garcia MD   sildenafil (REVATIO) 20 MG tablet Take 3 tablets by mouth as needed (erectile dysfunction) Yes Leann Garcia MD   zoster recombinant adjuvanted vaccine (SHINGRIX) 50 MCG SUSR injection Inject 0.5 mLs into the muscle every 6 months for 2 doses Yes Leann Garcia MD   Exenatide (BYDUREON) 2 MG PEN Inject 1 pen into the skin every 7 days Yes Leann Garcia MD   fluticasone (FLONASE) 50 MCG/ACT nasal spray USE ONE SPRAY(S) IN EACH NOSTRIL ONCE DAILY Yes Estrella Jose, DO   Blood Glucose Monitoring Suppl (FREESTYLE FREEDOM LITE) W/DEVICE KIT 1 kit by Does not apply rhinorrhea. Right sinus exhibits no maxillary sinus tenderness and no frontal sinus tenderness. Left sinus exhibits no maxillary sinus tenderness and no frontal sinus tenderness. Mouth/Throat: Uvula is midline and oropharynx is clear and moist. No oropharyngeal exudate or posterior oropharyngeal erythema. Eyes: Pupils are equal, round, and reactive to light. Conjunctivae and EOM are normal.   Neck: Neck supple. Cardiovascular: Normal rate, regular rhythm and normal heart sounds. Pulmonary/Chest: Effort normal and breath sounds normal. No respiratory distress. He has no wheezes. He has no rales. He exhibits no tenderness. Abdominal: Soft. Bowel sounds are normal. He exhibits no distension. There is no tenderness. Lymphadenopathy:     He has no cervical adenopathy. Neurological: He is alert. Skin: He is not diaphoretic. Nursing note and vitals reviewed. ASSESSMENT/PLAN:  1. Uncontrolled type 2 diabetes mellitus with complication, unspecified whether long term insulin use (HCC)  Uncontrolled. Patient is unwilling to try any new medication at this point. Wishes to get Victoza samples and use that to see if it lowers hemoglobin A1C.  - POCT glycosylated hemoglobin (Hb A1C)  - Vitamin B12    2. Elevated PSA  Recheck PSA per orders.  - Psa screening    3. Viral URI  Discussed the natural course of a viral illness and advised that antibiotics would not help in this situation. Recommend nasal saline prn, mucinex, and fluids. Patient to call office if no improvement in 1 week      4. Screening for colon cancer  - POCT FECAL IMMUNOCHEMICAL TEST (FIT); Future      No Follow-up on file. An electronic signature was used to authenticate this note.     --Jono Ricci MD on 9/30/2018 at 10:27 AM

## 2018-11-05 ENCOUNTER — OFFICE VISIT (OUTPATIENT)
Dept: DERMATOLOGY | Age: 68
End: 2018-11-05
Payer: MEDICARE

## 2018-11-05 DIAGNOSIS — L57.0 ACTINIC KERATOSES: Primary | ICD-10-CM

## 2018-11-05 DIAGNOSIS — Z12.83 SCREENING EXAM FOR SKIN CANCER: ICD-10-CM

## 2018-11-05 PROCEDURE — 1123F ACP DISCUSS/DSCN MKR DOCD: CPT | Performed by: DERMATOLOGY

## 2018-11-05 PROCEDURE — 17000 DESTRUCT PREMALG LESION: CPT | Performed by: DERMATOLOGY

## 2018-11-05 PROCEDURE — 17003 DESTRUCT PREMALG LES 2-14: CPT | Performed by: DERMATOLOGY

## 2018-11-05 PROCEDURE — G8417 CALC BMI ABV UP PARAM F/U: HCPCS | Performed by: DERMATOLOGY

## 2018-11-05 PROCEDURE — 1101F PT FALLS ASSESS-DOCD LE1/YR: CPT | Performed by: DERMATOLOGY

## 2018-11-05 PROCEDURE — 4040F PNEUMOC VAC/ADMIN/RCVD: CPT | Performed by: DERMATOLOGY

## 2018-11-05 PROCEDURE — G8427 DOCREV CUR MEDS BY ELIG CLIN: HCPCS | Performed by: DERMATOLOGY

## 2018-11-05 PROCEDURE — 1036F TOBACCO NON-USER: CPT | Performed by: DERMATOLOGY

## 2018-11-05 PROCEDURE — 99213 OFFICE O/P EST LOW 20 MIN: CPT | Performed by: DERMATOLOGY

## 2018-11-05 PROCEDURE — G8484 FLU IMMUNIZE NO ADMIN: HCPCS | Performed by: DERMATOLOGY

## 2018-11-05 PROCEDURE — 3017F COLORECTAL CA SCREEN DOC REV: CPT | Performed by: DERMATOLOGY

## 2018-11-12 ENCOUNTER — TELEPHONE (OUTPATIENT)
Dept: FAMILY MEDICINE CLINIC | Age: 68
End: 2018-11-12

## 2018-11-12 ENCOUNTER — PATIENT MESSAGE (OUTPATIENT)
Dept: FAMILY MEDICINE CLINIC | Age: 68
End: 2018-11-12

## 2018-11-12 ENCOUNTER — OFFICE VISIT (OUTPATIENT)
Dept: FAMILY MEDICINE CLINIC | Age: 68
End: 2018-11-12
Payer: MEDICARE

## 2018-11-12 ENCOUNTER — CARE COORDINATION (OUTPATIENT)
Dept: CARE COORDINATION | Age: 68
End: 2018-11-12

## 2018-11-12 VITALS
HEART RATE: 76 BPM | HEIGHT: 64 IN | DIASTOLIC BLOOD PRESSURE: 80 MMHG | TEMPERATURE: 98.4 F | SYSTOLIC BLOOD PRESSURE: 134 MMHG | WEIGHT: 214.6 LBS | BODY MASS INDEX: 36.64 KG/M2

## 2018-11-12 DIAGNOSIS — Z12.11 SCREENING FOR COLORECTAL CANCER: ICD-10-CM

## 2018-11-12 DIAGNOSIS — Z00.00 ROUTINE GENERAL MEDICAL EXAMINATION AT A HEALTH CARE FACILITY: Primary | ICD-10-CM

## 2018-11-12 DIAGNOSIS — E11.65 UNCONTROLLED TYPE 2 DIABETES MELLITUS WITH HYPERGLYCEMIA (HCC): ICD-10-CM

## 2018-11-12 DIAGNOSIS — J20.9 ACUTE BRONCHITIS, UNSPECIFIED ORGANISM: ICD-10-CM

## 2018-11-12 DIAGNOSIS — Z12.12 SCREENING FOR COLORECTAL CANCER: ICD-10-CM

## 2018-11-12 LAB — HBA1C MFR BLD: 9.3 %

## 2018-11-12 PROCEDURE — 83036 HEMOGLOBIN GLYCOSYLATED A1C: CPT | Performed by: FAMILY MEDICINE

## 2018-11-12 PROCEDURE — 1101F PT FALLS ASSESS-DOCD LE1/YR: CPT | Performed by: FAMILY MEDICINE

## 2018-11-12 PROCEDURE — 3017F COLORECTAL CA SCREEN DOC REV: CPT | Performed by: FAMILY MEDICINE

## 2018-11-12 PROCEDURE — 4040F PNEUMOC VAC/ADMIN/RCVD: CPT | Performed by: FAMILY MEDICINE

## 2018-11-12 PROCEDURE — G8427 DOCREV CUR MEDS BY ELIG CLIN: HCPCS | Performed by: FAMILY MEDICINE

## 2018-11-12 PROCEDURE — 1036F TOBACCO NON-USER: CPT | Performed by: FAMILY MEDICINE

## 2018-11-12 PROCEDURE — 1123F ACP DISCUSS/DSCN MKR DOCD: CPT | Performed by: FAMILY MEDICINE

## 2018-11-12 PROCEDURE — G8417 CALC BMI ABV UP PARAM F/U: HCPCS | Performed by: FAMILY MEDICINE

## 2018-11-12 PROCEDURE — 2022F DILAT RTA XM EVC RTNOPTHY: CPT | Performed by: FAMILY MEDICINE

## 2018-11-12 PROCEDURE — G8484 FLU IMMUNIZE NO ADMIN: HCPCS | Performed by: FAMILY MEDICINE

## 2018-11-12 PROCEDURE — 3046F HEMOGLOBIN A1C LEVEL >9.0%: CPT | Performed by: FAMILY MEDICINE

## 2018-11-12 PROCEDURE — 99214 OFFICE O/P EST MOD 30 MIN: CPT | Performed by: FAMILY MEDICINE

## 2018-11-12 RX ORDER — BLOOD SUGAR DIAGNOSTIC
1 STRIP MISCELLANEOUS 2 TIMES DAILY
Qty: 100 EACH | Refills: 5 | Status: SHIPPED | OUTPATIENT
Start: 2018-11-12 | End: 2019-04-15 | Stop reason: SDUPTHER

## 2018-11-12 RX ORDER — M-VIT,TX,IRON,MINS/CALC/FOLIC 27MG-0.4MG
1 TABLET ORAL DAILY
COMMUNITY

## 2018-11-12 RX ORDER — AZITHROMYCIN 250 MG/1
TABLET, FILM COATED ORAL
Qty: 1 PACKET | Refills: 0 | Status: SHIPPED | OUTPATIENT
Start: 2018-11-12 | End: 2019-01-14 | Stop reason: ALTCHOICE

## 2018-11-12 ASSESSMENT — PATIENT HEALTH QUESTIONNAIRE - PHQ9
SUM OF ALL RESPONSES TO PHQ QUESTIONS 1-9: 0
SUM OF ALL RESPONSES TO PHQ QUESTIONS 1-9: 0

## 2018-11-12 ASSESSMENT — LIFESTYLE VARIABLES: HOW OFTEN DO YOU HAVE A DRINK CONTAINING ALCOHOL: 0

## 2018-11-12 ASSESSMENT — ANXIETY QUESTIONNAIRES: GAD7 TOTAL SCORE: 0

## 2018-11-12 NOTE — CARE COORDINATION
Call to pt to introduce [address] with wife who is on JOSEPH for PHI    Pt was unavailable to speak with Santosh Barry [phone] did not have insulin - will have to order  Wife agrees to call [facility] when they have insulin and syringes and will plan ed session and review of injection technique at that time.

## 2018-11-13 NOTE — PROGRESS NOTES
Authorizing Provider   Multiple Vitamins-Minerals (THERAPEUTIC MULTIVITAMIN-MINERALS) tablet Take 1 tablet by mouth daily Yes Historical Provider, MD   azithromycin (ZITHROMAX) 250 MG tablet Take 2 tabs by mouth daily on day 1, then take 1 tab daily for days 2-5 Yes Shruthi Rodriguez MD   Alcohol Swabs (ALCOHOL PADS) 70 % PADS 1 each by Does not apply route 2 times daily Yes Shruthi Rodriguez MD   Insulin NPH Isophane & Regular (HUMULIN 70/30 KWIKPEN) (70-30) 100 UNIT per ML injection pen Inject 25 Units into the skin 2 times daily (before meals) Yes Shruthi Rodriguez MD   acarbose (PRECOSE) 100 MG tablet TAKE ONE TABLET BY MOUTH THREE TIMES DAILY WITH MEALS Yes Shruthi Rodriguez MD   atorvastatin (LIPITOR) 40 MG tablet TAKE 1 TABLET BY MOUTH ONE TIME A DAY Yes Shruthi Rodriguez MD   etodolac (LODINE) 300 MG capsule Take 1 capsule by mouth every 8 hours Yes Shruthi Rodriguez MD   glimepiride (AMARYL) 4 MG tablet TAKE ONE TABLET BY MOUTH TWICE DAILY BEFORE MEAL(S) FOR 90 DAYS Yes Shruthi Rodriguez MD   losartan (COZAAR) 25 MG tablet TAKE 1 TABLET BY MOUTH ONCE DAILY Yes Shruthi Rodriguez MD   ranitidine (ZANTAC) 150 MG tablet TAKE ONE TABLET BY MOUTH TWICE DAILY Yes Shruthi Rodriguez MD   metFORMIN (GLUCOPHAGE-XR) 750 MG extended release tablet Take 2 tablets by mouth daily (with breakfast) Yes Shruthi Rodriguez MD   sildenafil (REVATIO) 20 MG tablet Take 3 tablets by mouth as needed (erectile dysfunction) Yes Shruthi Rodriguez MD   fluticasone (FLONASE) 50 MCG/ACT nasal spray USE ONE SPRAY(S) IN EACH NOSTRIL ONCE DAILY Yes Estrella Jose,    Blood Glucose Monitoring Suppl (FREESTYLE FREEDOM LITE) W/DEVICE KIT 1 kit by Does not apply route daily Yes Shruthi Rodriguez MD   Insulin Pen Needle (CAREFINE PEN NEEDLES) 32G X 4 MM MISC 1 each by Does not apply route daily Yes Shruthi Rodriguez MD   Glucose Blood (BLOOD GLUCOSE TEST STRIPS) STRP Dx 250.02, Test blood sugar 4 times daily.  Yes Shruthi Rodriguez MD   Lancets MISC 1 each by Does not apply route 4

## 2018-11-13 NOTE — TELEPHONE ENCOUNTER
Changes to rx for novolin are pended as phone in. Jose Antonio Mcdonough also phoned in syringes. Pended as phone in. Please sign on off.

## 2018-11-14 ENCOUNTER — CARE COORDINATION (OUTPATIENT)
Dept: CARE COORDINATION | Age: 68
End: 2018-11-14

## 2018-11-15 ENCOUNTER — CARE COORDINATION (OUTPATIENT)
Dept: CARE COORDINATION | Age: 68
End: 2018-11-15

## 2018-11-15 ENCOUNTER — PATIENT MESSAGE (OUTPATIENT)
Dept: FAMILY MEDICINE CLINIC | Age: 68
End: 2018-11-15

## 2018-11-15 DIAGNOSIS — E11.65 UNCONTROLLED TYPE 2 DIABETES MELLITUS WITH HYPERGLYCEMIA (HCC): Primary | ICD-10-CM

## 2018-11-15 ASSESSMENT — ENCOUNTER SYMPTOMS
SHORTNESS OF BREATH: 0
COUGH: 1

## 2018-11-16 RX ORDER — LANCETS 30 GAUGE
1 EACH MISCELLANEOUS 4 TIMES DAILY
Qty: 150 EACH | Refills: 3 | Status: SHIPPED | OUTPATIENT
Start: 2018-11-16 | End: 2018-11-26 | Stop reason: SDUPTHER

## 2018-11-16 RX ORDER — GLUCOSAMINE HCL/CHONDROITIN SU 500-400 MG
CAPSULE ORAL
Qty: 150 STRIP | Refills: 3 | Status: SHIPPED | OUTPATIENT
Start: 2018-11-16 | End: 2019-02-15 | Stop reason: ALTCHOICE

## 2018-11-26 RX ORDER — LANCETS 28 GAUGE
EACH MISCELLANEOUS
Refills: 3 | OUTPATIENT
Start: 2018-11-26

## 2018-11-26 RX ORDER — BLOOD-GLUCOSE METER
KIT MISCELLANEOUS
Refills: 3 | OUTPATIENT
Start: 2018-11-26

## 2018-11-28 RX ORDER — GLUCOSAMINE HCL/CHONDROITIN SU 500-400 MG
CAPSULE ORAL
Qty: 500 STRIP | Refills: 3 | Status: SHIPPED | OUTPATIENT
Start: 2018-11-28 | End: 2019-04-15 | Stop reason: SDUPTHER

## 2018-12-10 ENCOUNTER — CARE COORDINATION (OUTPATIENT)
Dept: CARE COORDINATION | Age: 68
End: 2018-12-10

## 2018-12-10 NOTE — CARE COORDINATION
ACC: Darice Lesches, RN  CM Risk Score: 1  Margie Mortality Risk Score: 6     Summary/Assessment:     Diabetes Regimen:    SMBG Testing Schedule:  blood sugars ac & HS  Oral Meds:  Acarbose 100mg TID with meals  Glimepiride 4mg BID with meals  Metformin 750mg two tabs daily with breakfast     Insulin - Novolin 70/30 -  15 or 20 units with breakfast and dinner   Average meal 60 -90 gms carbs - last night 1 cup rice and 1 cup noodles with stir modi  Usual and consistent activity level   Meals are not always consistent - time for breakfast anywhere from 800a to 12 noon - Sometimes no evening meals   PO Meds continues to take as Rxd        Intervention: Referral/Curbside consult with Pharm D.    Discussed actions of 70/30  Reviewed carb history    Plan:    Recommendation by Callie RODRIGUEZ - discontinue glimepiride     Future Appointments  Date Time Provider Som Lucas   11/11/2019 11:45 AM Ariana Hernandez MD [facility]         Care Coordination Interventions    Program Enrollment:  Rising Risk  Referral from Primary Care Provider:  Yes  Suggested Interventions and Community Resources             Goals Addressed     None

## 2018-12-12 ENCOUNTER — CARE COORDINATION (OUTPATIENT)
Dept: CARE COORDINATION | Age: 68
End: 2018-12-12

## 2018-12-24 ENCOUNTER — CARE COORDINATION (OUTPATIENT)
Dept: CARE COORDINATION | Age: 68
End: 2018-12-24

## 2018-12-31 ENCOUNTER — CARE COORDINATION (OUTPATIENT)
Dept: CARE COORDINATION | Age: 68
End: 2018-12-31

## 2018-12-31 NOTE — CARE COORDINATION
Message to pt and he reports being busy with family with holiday  Commits to enter BGs into [employer]. Denies needs at this time. See [employer] messages. Plan:  Follow up next week.

## 2019-01-14 ENCOUNTER — OFFICE VISIT (OUTPATIENT)
Dept: FAMILY MEDICINE CLINIC | Age: 69
End: 2019-01-14
Payer: COMMERCIAL

## 2019-01-14 ENCOUNTER — PATIENT MESSAGE (OUTPATIENT)
Dept: FAMILY MEDICINE CLINIC | Age: 69
End: 2019-01-14

## 2019-01-14 VITALS
OXYGEN SATURATION: 97 % | BODY MASS INDEX: 33.43 KG/M2 | WEIGHT: 213 LBS | DIASTOLIC BLOOD PRESSURE: 70 MMHG | HEART RATE: 87 BPM | TEMPERATURE: 98.3 F | SYSTOLIC BLOOD PRESSURE: 130 MMHG | HEIGHT: 67 IN

## 2019-01-14 DIAGNOSIS — E11.3299 MILD NONPROLIFERATIVE DIABETIC RETINOPATHY WITHOUT MACULAR EDEMA ASSOCIATED WITH TYPE 2 DIABETES MELLITUS, UNSPECIFIED LATERALITY (HCC): ICD-10-CM

## 2019-01-14 DIAGNOSIS — J20.9 ACUTE BRONCHITIS WITH BRONCHOSPASM: Primary | ICD-10-CM

## 2019-01-14 DIAGNOSIS — E11.65 UNCONTROLLED TYPE 2 DIABETES MELLITUS WITH HYPERGLYCEMIA (HCC): ICD-10-CM

## 2019-01-14 LAB — HBA1C MFR BLD: 9.2 %

## 2019-01-14 PROCEDURE — 1036F TOBACCO NON-USER: CPT | Performed by: FAMILY MEDICINE

## 2019-01-14 PROCEDURE — 99214 OFFICE O/P EST MOD 30 MIN: CPT | Performed by: FAMILY MEDICINE

## 2019-01-14 PROCEDURE — 2022F DILAT RTA XM EVC RTNOPTHY: CPT | Performed by: FAMILY MEDICINE

## 2019-01-14 PROCEDURE — 1123F ACP DISCUSS/DSCN MKR DOCD: CPT | Performed by: FAMILY MEDICINE

## 2019-01-14 PROCEDURE — G8427 DOCREV CUR MEDS BY ELIG CLIN: HCPCS | Performed by: FAMILY MEDICINE

## 2019-01-14 PROCEDURE — 1101F PT FALLS ASSESS-DOCD LE1/YR: CPT | Performed by: FAMILY MEDICINE

## 2019-01-14 PROCEDURE — G8484 FLU IMMUNIZE NO ADMIN: HCPCS | Performed by: FAMILY MEDICINE

## 2019-01-14 PROCEDURE — 4040F PNEUMOC VAC/ADMIN/RCVD: CPT | Performed by: FAMILY MEDICINE

## 2019-01-14 PROCEDURE — 3017F COLORECTAL CA SCREEN DOC REV: CPT | Performed by: FAMILY MEDICINE

## 2019-01-14 PROCEDURE — 3046F HEMOGLOBIN A1C LEVEL >9.0%: CPT | Performed by: FAMILY MEDICINE

## 2019-01-14 PROCEDURE — 83036 HEMOGLOBIN GLYCOSYLATED A1C: CPT | Performed by: FAMILY MEDICINE

## 2019-01-14 PROCEDURE — 94640 AIRWAY INHALATION TREATMENT: CPT | Performed by: FAMILY MEDICINE

## 2019-01-14 PROCEDURE — G8417 CALC BMI ABV UP PARAM F/U: HCPCS | Performed by: FAMILY MEDICINE

## 2019-01-14 RX ORDER — AMOXICILLIN AND CLAVULANATE POTASSIUM 875; 125 MG/1; MG/1
1 TABLET, FILM COATED ORAL 2 TIMES DAILY
Qty: 20 TABLET | Refills: 0 | Status: SHIPPED | OUTPATIENT
Start: 2019-01-14 | End: 2019-01-24

## 2019-01-14 RX ORDER — IPRATROPIUM BROMIDE AND ALBUTEROL SULFATE 2.5; .5 MG/3ML; MG/3ML
1 SOLUTION RESPIRATORY (INHALATION) ONCE
Status: COMPLETED | OUTPATIENT
Start: 2019-01-14 | End: 2019-01-14

## 2019-01-14 RX ORDER — ALBUTEROL SULFATE 2.5 MG/3ML
2.5 SOLUTION RESPIRATORY (INHALATION) EVERY 4 HOURS PRN
Qty: 120 EACH | Refills: 0 | Status: SHIPPED | OUTPATIENT
Start: 2019-01-14 | End: 2019-03-18 | Stop reason: SDUPTHER

## 2019-01-14 RX ADMIN — IPRATROPIUM BROMIDE AND ALBUTEROL SULFATE 1 AMPULE: 2.5; .5 SOLUTION RESPIRATORY (INHALATION) at 14:55

## 2019-01-15 ASSESSMENT — ENCOUNTER SYMPTOMS
SORE THROAT: 0
SHORTNESS OF BREATH: 0
COUGH: 1
WHEEZING: 1

## 2019-01-21 ENCOUNTER — TELEPHONE (OUTPATIENT)
Dept: FAMILY MEDICINE CLINIC | Age: 69
End: 2019-01-21

## 2019-01-22 ENCOUNTER — TELEPHONE (OUTPATIENT)
Dept: FAMILY MEDICINE CLINIC | Age: 69
End: 2019-01-22

## 2019-02-08 ENCOUNTER — CARE COORDINATION (OUTPATIENT)
Dept: CARE COORDINATION | Age: 69
End: 2019-02-08

## 2019-02-11 ENCOUNTER — CARE COORDINATION (OUTPATIENT)
Dept: CARE COORDINATION | Age: 69
End: 2019-02-11

## 2019-02-12 ENCOUNTER — CARE COORDINATION (OUTPATIENT)
Dept: CARE COORDINATION | Age: 69
End: 2019-02-12

## 2019-02-15 ENCOUNTER — CARE COORDINATION (OUTPATIENT)
Dept: CARE COORDINATION | Age: 69
End: 2019-02-15

## 2019-02-15 ENCOUNTER — TELEPHONE (OUTPATIENT)
Dept: PHARMACY | Facility: CLINIC | Age: 69
End: 2019-02-15

## 2019-02-18 ENCOUNTER — CARE COORDINATION (OUTPATIENT)
Dept: CARE COORDINATION | Age: 69
End: 2019-02-18

## 2019-02-26 RX ORDER — METFORMIN HYDROCHLORIDE 500 MG/1
2000 TABLET, EXTENDED RELEASE ORAL
Qty: 120 TABLET | Refills: 1 | Status: SHIPPED | OUTPATIENT
Start: 2019-02-26 | End: 2019-04-15 | Stop reason: SDUPTHER

## 2019-02-27 ENCOUNTER — CARE COORDINATION (OUTPATIENT)
Dept: CARE COORDINATION | Age: 69
End: 2019-02-27

## 2019-02-28 RX ORDER — NAPROXEN SODIUM 220 MG
1 TABLET ORAL 4 TIMES DAILY
Qty: 100 EACH | Refills: 3 | Status: SHIPPED | OUTPATIENT
Start: 2019-02-28 | End: 2019-04-15 | Stop reason: SDUPTHER

## 2019-03-01 ENCOUNTER — CARE COORDINATION (OUTPATIENT)
Dept: CARE COORDINATION | Age: 69
End: 2019-03-01

## 2019-03-04 ENCOUNTER — CARE COORDINATION (OUTPATIENT)
Dept: CARE COORDINATION | Age: 69
End: 2019-03-04

## 2019-03-06 ENCOUNTER — PATIENT MESSAGE (OUTPATIENT)
Dept: FAMILY MEDICINE CLINIC | Age: 69
End: 2019-03-06

## 2019-03-06 DIAGNOSIS — J20.9 ACUTE BRONCHITIS WITH BRONCHOSPASM: ICD-10-CM

## 2019-03-15 ENCOUNTER — CARE COORDINATION (OUTPATIENT)
Dept: CARE COORDINATION | Age: 69
End: 2019-03-15

## 2019-03-18 RX ORDER — ALBUTEROL SULFATE 2.5 MG/3ML
2.5 SOLUTION RESPIRATORY (INHALATION) EVERY 4 HOURS PRN
Qty: 120 EACH | Refills: 5 | Status: SHIPPED | OUTPATIENT
Start: 2019-03-18 | End: 2021-03-10 | Stop reason: SDUPTHER

## 2019-04-10 ENCOUNTER — CARE COORDINATION (OUTPATIENT)
Dept: CARE COORDINATION | Age: 69
End: 2019-04-10

## 2019-04-15 ENCOUNTER — OFFICE VISIT (OUTPATIENT)
Dept: FAMILY MEDICINE CLINIC | Age: 69
End: 2019-04-15
Payer: MEDICARE

## 2019-04-15 VITALS
HEART RATE: 80 BPM | WEIGHT: 225 LBS | SYSTOLIC BLOOD PRESSURE: 136 MMHG | TEMPERATURE: 97.8 F | OXYGEN SATURATION: 97 % | DIASTOLIC BLOOD PRESSURE: 80 MMHG | HEIGHT: 67 IN | BODY MASS INDEX: 35.31 KG/M2

## 2019-04-15 DIAGNOSIS — R97.20 ELEVATED PSA: ICD-10-CM

## 2019-04-15 LAB
A/G RATIO: 1.8 (ref 1.1–2.2)
ALBUMIN SERPL-MCNC: 4.6 G/DL (ref 3.4–5)
ALP BLD-CCNC: 113 U/L (ref 40–129)
ALT SERPL-CCNC: 80 U/L (ref 10–40)
ANION GAP SERPL CALCULATED.3IONS-SCNC: 17 MMOL/L (ref 3–16)
AST SERPL-CCNC: 46 U/L (ref 15–37)
BILIRUB SERPL-MCNC: 0.3 MG/DL (ref 0–1)
BUN BLDV-MCNC: 9 MG/DL (ref 7–20)
CALCIUM SERPL-MCNC: 9.4 MG/DL (ref 8.3–10.6)
CHLORIDE BLD-SCNC: 103 MMOL/L (ref 99–110)
CHOLESTEROL, TOTAL: 181 MG/DL (ref 0–199)
CO2: 25 MMOL/L (ref 21–32)
CREAT SERPL-MCNC: 0.8 MG/DL (ref 0.8–1.3)
CREATININE URINE POCT: 100
GFR AFRICAN AMERICAN: >60
GFR NON-AFRICAN AMERICAN: >60
GLOBULIN: 2.5 G/DL
GLUCOSE BLD-MCNC: 156 MG/DL (ref 70–99)
HBA1C MFR BLD: 8 %
HDLC SERPL-MCNC: 55 MG/DL (ref 40–60)
LDL CHOLESTEROL CALCULATED: 105 MG/DL
MICROALBUMIN/CREAT 24H UR: 30 MG/G{CREAT}
MICROALBUMIN/CREAT UR-RTO: <30
POTASSIUM SERPL-SCNC: 4.8 MMOL/L (ref 3.5–5.1)
SODIUM BLD-SCNC: 145 MMOL/L (ref 136–145)
TOTAL PROTEIN: 7.1 G/DL (ref 6.4–8.2)
TRIGL SERPL-MCNC: 103 MG/DL (ref 0–150)
VLDLC SERPL CALC-MCNC: 21 MG/DL

## 2019-04-15 PROCEDURE — 1036F TOBACCO NON-USER: CPT | Performed by: FAMILY MEDICINE

## 2019-04-15 PROCEDURE — 1123F ACP DISCUSS/DSCN MKR DOCD: CPT | Performed by: FAMILY MEDICINE

## 2019-04-15 PROCEDURE — 36415 COLL VENOUS BLD VENIPUNCTURE: CPT | Performed by: FAMILY MEDICINE

## 2019-04-15 PROCEDURE — G8417 CALC BMI ABV UP PARAM F/U: HCPCS | Performed by: FAMILY MEDICINE

## 2019-04-15 PROCEDURE — 82044 UR ALBUMIN SEMIQUANTITATIVE: CPT | Performed by: FAMILY MEDICINE

## 2019-04-15 PROCEDURE — 83036 HEMOGLOBIN GLYCOSYLATED A1C: CPT | Performed by: FAMILY MEDICINE

## 2019-04-15 PROCEDURE — 3045F PR MOST RECENT HEMOGLOBIN A1C LEVEL 7.0-9.0%: CPT | Performed by: FAMILY MEDICINE

## 2019-04-15 PROCEDURE — 3017F COLORECTAL CA SCREEN DOC REV: CPT | Performed by: FAMILY MEDICINE

## 2019-04-15 PROCEDURE — 99213 OFFICE O/P EST LOW 20 MIN: CPT | Performed by: FAMILY MEDICINE

## 2019-04-15 PROCEDURE — 2022F DILAT RTA XM EVC RTNOPTHY: CPT | Performed by: FAMILY MEDICINE

## 2019-04-15 PROCEDURE — G8428 CUR MEDS NOT DOCUMENT: HCPCS | Performed by: FAMILY MEDICINE

## 2019-04-15 PROCEDURE — 4040F PNEUMOC VAC/ADMIN/RCVD: CPT | Performed by: FAMILY MEDICINE

## 2019-04-15 RX ORDER — BLOOD SUGAR DIAGNOSTIC
1 STRIP MISCELLANEOUS 2 TIMES DAILY
Qty: 100 EACH | Refills: 5 | Status: SHIPPED | OUTPATIENT
Start: 2019-04-15 | End: 2021-03-10 | Stop reason: SDUPTHER

## 2019-04-15 RX ORDER — NAPROXEN SODIUM 220 MG
1 TABLET ORAL 4 TIMES DAILY
Qty: 100 EACH | Refills: 3 | Status: SHIPPED | OUTPATIENT
Start: 2019-04-15 | End: 2019-11-25 | Stop reason: SDUPTHER

## 2019-04-15 RX ORDER — GLUCOSAMINE HCL/CHONDROITIN SU 500-400 MG
CAPSULE ORAL
Qty: 500 STRIP | Refills: 3 | Status: SHIPPED | OUTPATIENT
Start: 2019-04-15 | End: 2020-01-24 | Stop reason: SDUPTHER

## 2019-04-15 RX ORDER — METFORMIN HYDROCHLORIDE 500 MG/1
2000 TABLET, EXTENDED RELEASE ORAL
Qty: 120 TABLET | Refills: 1 | Status: SHIPPED | OUTPATIENT
Start: 2019-04-15 | End: 2019-10-21

## 2019-04-15 RX ORDER — LANCETS 30 GAUGE
EACH MISCELLANEOUS
Qty: 500 EACH | Refills: 3 | Status: SHIPPED | OUTPATIENT
Start: 2019-04-15 | End: 2020-01-24 | Stop reason: SDUPTHER

## 2019-04-15 ASSESSMENT — PATIENT HEALTH QUESTIONNAIRE - PHQ9
2. FEELING DOWN, DEPRESSED OR HOPELESS: 0
1. LITTLE INTEREST OR PLEASURE IN DOING THINGS: 0
SUM OF ALL RESPONSES TO PHQ9 QUESTIONS 1 & 2: 0
SUM OF ALL RESPONSES TO PHQ QUESTIONS 1-9: 0
SUM OF ALL RESPONSES TO PHQ QUESTIONS 1-9: 0

## 2019-04-15 ASSESSMENT — ENCOUNTER SYMPTOMS: COLOR CHANGE: 0

## 2019-04-15 NOTE — PROGRESS NOTES
4/15/2019     Carolina Hawthorne (:  1950) is a 70 y.o. male, here for evaluation of the following medical concerns:    HPI  Diabetes Mellitus Type 2: Current symptoms/problems include weight gain. Medication compliance:  compliant most of the time  Diabetic diet compliance:  compliant most of the time,  Weight trend: increasing  Current exercise: no regular exercise  Barriers: stress and time constraints    Home blood sugar records: fasting range: usually in the 100's, getting better  Any episodes of hypoglycemia? Yes, if he tries to skip lunch, he will go low  Eye exam current (within one year): no   reports that he has never smoked. He has never used smokeless tobacco.   Daily Aspirin? Yes    Lab Results   Component Value Date    LABA1C 8.0 04/15/2019    LABA1C 9.2 2019    LABA1C 9.3 2018     Lab Results   Component Value Date    CREATININE 0.9 2018     Lab Results   Component Value Date    ALT 45 (H) 2018    AST 24 2018     Lab Results   Component Value Date    CHOL 198 2018    TRIG 174 (H) 2018    HDL 50 2018    LDLCALC 113 (H) 2018          Review of Systems   Constitutional: Positive for unexpected weight change (gaining). Negative for activity change and appetite change. Eyes: Positive for visual disturbance. Skin: Negative for color change, rash and wound. Neurological: Positive for numbness (occasional numbness in left 2nd and 3rd toes from a back problem). Prior to Visit Medications    Medication Sig Taking?  Authorizing Provider   insulin 70-30 (NOVOLIN 70/30) (70-30) 100 UNIT per ML injection vial Inject 25 units in the morning and 10-15 units nightly Yes Rosi Emanuel MD   Insulin Syringe-Needle U-100 (INSULIN SYRINGE .5CC/31GX5/16\") 31G X 5/16\" 0.5 ML MISC 1 each by Does not apply route 4 times daily Yes Rosi Emanuel MD   metFORMIN (GLUCOPHAGE-XR) 500 MG extended release tablet Take 4 tablets by mouth daily (with breakfast) Yes Sarah Clements MD   Lancets MISC Dx 250.02, use to test blood glucose 7 times daily Yes Sarah Clements MD   blood glucose monitor strips Test 7 times a day & as needed for symptoms of irregular blood glucose. Freestyle Strips per patient. Dx: E11.65 Yes Sarah Clements MD   Alcohol Swabs (ALCOHOL PADS) 70 % PADS 1 each by Does not apply route 2 times daily Yes Sarah Clements MD   albuterol (PROVENTIL) (2.5 MG/3ML) 0.083% nebulizer solution Take 3 mLs by nebulization every 4 hours as needed for Wheezing or Shortness of Breath  Sarah Clements MD   Multiple Vitamins-Minerals (THERAPEUTIC MULTIVITAMIN-MINERALS) tablet Take 1 tablet by mouth daily  Historical Provider, MD   acarbose (PRECOSE) 100 MG tablet TAKE ONE TABLET BY MOUTH THREE TIMES DAILY WITH MEALS  Sarah Clements MD   atorvastatin (LIPITOR) 40 MG tablet TAKE 1 TABLET BY MOUTH ONE TIME A DAY  Sarah Clements MD   etodolac (LODINE) 300 MG capsule Take 1 capsule by mouth every 8 hours  Sarah Clements MD   losartan (COZAAR) 25 MG tablet TAKE 1 TABLET BY MOUTH ONCE DAILY  Sarah Clements MD   ranitidine (ZANTAC) 150 MG tablet TAKE ONE TABLET BY MOUTH TWICE DAILY  Sarah Clements MD   sildenafil (REVATIO) 20 MG tablet Take 3 tablets by mouth as needed (erectile dysfunction)  Sarah Clements MD   fluticasone (FLONASE) 50 MCG/ACT nasal spray USE ONE SPRAY(S) IN EACH NOSTRIL ONCE DAILY  Estrella Jose, DO   Blood Glucose Monitoring Suppl (FREESTYLE FREEDOM LITE) W/DEVICE KIT 1 kit by Does not apply route daily  Sarah Clements MD   tadalafil (CIALIS) 5 MG tablet Take 1 tablet by mouth daily as needed for Erectile Dysfunction  Sarah Clements MD   albuterol sulfate (PROAIR RESPICLICK) 681 (90 BASE) MCG/ACT aerosol powder inhalation Inhale 1-2 puffs into the lungs every 4 hours as needed for Wheezing or Shortness of Breath  Sarah Clements MD   aspirin 81 MG chewable tablet Take 81 mg by mouth daily.  OTC   Historical Provider, MD   fish oil-omega-3 fatty acids

## 2019-04-17 LAB
PROSTATE SPECIFIC ANTIGEN FREE: 1.5 UG/L
PROSTATE SPECIFIC ANTIGEN PERCENT FREE: 15.8 %
PROSTATE SPECIFIC ANTIGEN: 9.5 UG/L (ref 0–4)

## 2019-04-23 ENCOUNTER — CARE COORDINATION (OUTPATIENT)
Dept: CARE COORDINATION | Age: 69
End: 2019-04-23

## 2019-04-23 NOTE — CARE COORDINATION
Called patient and left HIPPA compliant message with RN ACC contact information requesting callback. Pt is reporting BG through [employer] daily. Will forward to PCP.     Patient Reported Blood Glucose   Blood Glucose Tracker 4/22/2019 4/21/2019 4/20/2019 4/19/2019 4/18/2019   Before breakfast blood glucose 150 118 107 121 111   Insulin dose - Breakfast 20 20 20 20 20   Insulin dose - Breakfast - - - - -   Before lunch blood glucose - - - - -   Before dinner blood glucose - - - - -   Insulin dose - Dinner 20 20 20 20 20   Before bed blood glucose 194 156 164 208 177

## 2019-05-07 ENCOUNTER — CARE COORDINATION (OUTPATIENT)
Dept: CARE COORDINATION | Age: 69
End: 2019-05-07

## 2019-05-08 NOTE — CARE COORDINATION
Called patient and left HIPPA compliant message with RN ACC contact information requesting callback. Will discuss insulin change.

## 2019-05-14 RX ORDER — ACARBOSE 100 MG/1
TABLET ORAL
Qty: 90 TABLET | Refills: 3 | Status: SHIPPED | OUTPATIENT
Start: 2019-05-14 | End: 2019-10-21 | Stop reason: SDUPTHER

## 2019-05-14 RX ORDER — SILDENAFIL CITRATE 20 MG/1
60 TABLET ORAL PRN
Qty: 30 TABLET | Refills: 3 | Status: SHIPPED | OUTPATIENT
Start: 2019-05-14 | End: 2019-07-22

## 2019-05-14 NOTE — TELEPHONE ENCOUNTER
.  Last office visit 5/24/2018     Last written 9-24-18 90 with 3 refills  Sildenafil -  7-24-18 30 with 3     Next office visit scheduled Visit date not found    Requested Prescriptions     Pending Prescriptions Disp Refills    sildenafil (REVATIO) 20 MG tablet [Pharmacy Med Name: SILDENAFIL 20MG TAB] 30 tablet 3     Sig: TAKE 3 TABLETS BY MOUTH AS NEEDED (ERECTILE  DYSFUNCTION)    acarbose (PRECOSE) 100 MG tablet [Pharmacy Med Name: ACARBOSE 100MG      TAB] 90 tablet 3     Sig: TAKE 1 TABLET BY MOUTH THREE TIMES DAILY WITH MEALS

## 2019-05-28 ENCOUNTER — CARE COORDINATION (OUTPATIENT)
Dept: CARE COORDINATION | Age: 69
End: 2019-05-28

## 2019-06-14 ENCOUNTER — CARE COORDINATION (OUTPATIENT)
Dept: CARE COORDINATION | Age: 69
End: 2019-06-14

## 2019-06-14 NOTE — CARE COORDINATION
Outreach call made. No answer, message with ACC contact information left. Chart review shows that patient is active on [employer]. Will send a letter through [employer]. The f/u introduction letter with [address] contact information was sent through the patient's [employer]. Plan:  If patient does not return message or call, will plan to discontinue for unable to reach.

## 2019-06-18 ENCOUNTER — TELEPHONE (OUTPATIENT)
Dept: FAMILY MEDICINE CLINIC | Age: 69
End: 2019-06-18

## 2019-06-21 ENCOUNTER — CARE COORDINATION (OUTPATIENT)
Dept: CARE COORDINATION | Age: 69
End: 2019-06-21

## 2019-06-21 NOTE — CARE COORDINATION
Taking? Authorizing Provider   sildenafil (REVATIO) 20 MG tablet TAKE 3 TABLETS BY MOUTH AS NEEDED (ERECTILE  DYSFUNCTION) 5/14/19   Renate Brooks MD   acarbose (PRECOSE) 100 MG tablet TAKE 1 TABLET BY MOUTH THREE TIMES DAILY WITH MEALS 5/14/19   Renate Brooks MD   insulin 70-30 (NOVOLIN 70/30) (70-30) 100 UNIT per ML injection vial Inject 25 units in the morning and 10-15 units nightly 4/15/19   Renate Brooks MD   Insulin Syringe-Needle U-100 (INSULIN SYRINGE .5CC/31GX5/16\") 31G X 5/16\" 0.5 ML MISC 1 each by Does not apply route 4 times daily 4/15/19   Renate Brooks MD   metFORMIN (GLUCOPHAGE-XR) 500 MG extended release tablet Take 4 tablets by mouth daily (with breakfast) 4/15/19   Renate Brooks MD   Lancets MISC Dx 250.02, use to test blood glucose 7 times daily 4/15/19   Renate Brooks MD   blood glucose monitor strips Test 7 times a day & as needed for symptoms of irregular blood glucose. Freestyle Strips per patient.  Dx: E11.65 4/15/19   Renate Brooks MD   Alcohol Swabs (ALCOHOL PADS) 70 % PADS 1 each by Does not apply route 2 times daily 4/15/19   Renate Brooks MD   albuterol (PROVENTIL) (2.5 MG/3ML) 0.083% nebulizer solution Take 3 mLs by nebulization every 4 hours as needed for Wheezing or Shortness of Breath 3/18/19   Renate Brooks MD   Multiple Vitamins-Minerals (THERAPEUTIC MULTIVITAMIN-MINERALS) tablet Take 1 tablet by mouth daily    Historical Provider, MD   atorvastatin (LIPITOR) 40 MG tablet TAKE 1 TABLET BY MOUTH ONE TIME A DAY 9/24/18   Renate Brooks MD   etodolac (LODINE) 300 MG capsule Take 1 capsule by mouth every 8 hours 9/24/18   Renate Brooks MD   losartan (COZAAR) 25 MG tablet TAKE 1 TABLET BY MOUTH ONCE DAILY 9/24/18   Renate Brooks MD   ranitidine (ZANTAC) 150 MG tablet TAKE ONE TABLET BY MOUTH TWICE DAILY 9/24/18   Renate Brooks MD   fluticasone (FLONASE) 50 MCG/ACT nasal spray USE ONE SPRAY(S) IN EACH NOSTRIL ONCE DAILY 3/5/18   Rabia Bauer, DO   Blood Glucose Monitoring Suppl (FREESTYLE FREEDOM LITE) W/DEVICE KIT 1 kit by Does not apply route daily 6/7/17   Juan Esquivel MD   tadalafil (CIALIS) 5 MG tablet Take 1 tablet by mouth daily as needed for Erectile Dysfunction 2/14/17 2/9/18  Juan Esquivel MD   albuterol sulfate (PROAIR RESPICLICK) 815 (90 BASE) MCG/ACT aerosol powder inhalation Inhale 1-2 puffs into the lungs every 4 hours as needed for Wheezing or Shortness of Breath 1/28/16   Juan Esquivel MD   aspirin 81 MG chewable tablet Take 81 mg by mouth daily. OTC     Historical Provider, MD   fish oil-omega-3 fatty acids 1000 MG capsule Take 2 g by mouth daily.  OTC    Historical Provider, MD       Future Appointments   Date Time Provider Som Szymanskii   7/22/2019 10:30 AM [facility]   11/11/2019 11:45 AM Willian Queen MD [facility] D[facility]     ,   Diabetes Assessment    Medic Alert ID:  No  How often do you test your blood sugar?:  Daily   Have you ever had to go to the ED for symptoms of low blood sugar?:  No       No patient-reported symptoms   Do you have hyperglycemia symptoms?:  No   Do you have hypoglycemia symptoms?:  No   Last Blood Sugar Value:  159   Blood Sugar Monitoring Regimen:  Morning Fasting, At Bedtime       and   General Assessment    Do you have any symptoms that are causing concern?:  No

## 2019-07-02 ENCOUNTER — CARE COORDINATION (OUTPATIENT)
Dept: CARE COORDINATION | Age: 69
End: 2019-07-02

## 2019-07-18 RX ORDER — FLUTICASONE PROPIONATE 50 MCG
SPRAY, SUSPENSION (ML) NASAL
Qty: 3 BOTTLE | Refills: 3 | Status: SHIPPED | OUTPATIENT
Start: 2019-07-18 | End: 2019-10-21 | Stop reason: SDUPTHER

## 2019-07-22 ENCOUNTER — CARE COORDINATION (OUTPATIENT)
Dept: FAMILY MEDICINE CLINIC | Age: 69
End: 2019-07-22

## 2019-07-22 ENCOUNTER — OFFICE VISIT (OUTPATIENT)
Dept: FAMILY MEDICINE CLINIC | Age: 69
End: 2019-07-22
Payer: MEDICARE

## 2019-07-22 VITALS
HEART RATE: 76 BPM | OXYGEN SATURATION: 97 % | WEIGHT: 222.2 LBS | BODY MASS INDEX: 35.71 KG/M2 | DIASTOLIC BLOOD PRESSURE: 70 MMHG | TEMPERATURE: 97.7 F | RESPIRATION RATE: 14 BRPM | SYSTOLIC BLOOD PRESSURE: 130 MMHG | HEIGHT: 66 IN

## 2019-07-22 DIAGNOSIS — H91.93 BILATERAL HEARING LOSS, UNSPECIFIED HEARING LOSS TYPE: ICD-10-CM

## 2019-07-22 DIAGNOSIS — E11.65 UNCONTROLLED TYPE 2 DIABETES MELLITUS WITH HYPERGLYCEMIA (HCC): ICD-10-CM

## 2019-07-22 DIAGNOSIS — Z12.11 COLON CANCER SCREENING: ICD-10-CM

## 2019-07-22 DIAGNOSIS — Z00.00 ROUTINE GENERAL MEDICAL EXAMINATION AT A HEALTH CARE FACILITY: Primary | ICD-10-CM

## 2019-07-22 LAB — HBA1C MFR BLD: 7.8 %

## 2019-07-22 PROCEDURE — G0439 PPPS, SUBSEQ VISIT: HCPCS | Performed by: FAMILY MEDICINE

## 2019-07-22 PROCEDURE — 99213 OFFICE O/P EST LOW 20 MIN: CPT | Performed by: FAMILY MEDICINE

## 2019-07-22 PROCEDURE — G8417 CALC BMI ABV UP PARAM F/U: HCPCS | Performed by: FAMILY MEDICINE

## 2019-07-22 PROCEDURE — 3017F COLORECTAL CA SCREEN DOC REV: CPT | Performed by: FAMILY MEDICINE

## 2019-07-22 PROCEDURE — 1123F ACP DISCUSS/DSCN MKR DOCD: CPT | Performed by: FAMILY MEDICINE

## 2019-07-22 PROCEDURE — 1036F TOBACCO NON-USER: CPT | Performed by: FAMILY MEDICINE

## 2019-07-22 PROCEDURE — 4040F PNEUMOC VAC/ADMIN/RCVD: CPT | Performed by: FAMILY MEDICINE

## 2019-07-22 PROCEDURE — G8427 DOCREV CUR MEDS BY ELIG CLIN: HCPCS | Performed by: FAMILY MEDICINE

## 2019-07-22 PROCEDURE — 2022F DILAT RTA XM EVC RTNOPTHY: CPT | Performed by: FAMILY MEDICINE

## 2019-07-22 PROCEDURE — 83036 HEMOGLOBIN GLYCOSYLATED A1C: CPT | Performed by: FAMILY MEDICINE

## 2019-07-22 PROCEDURE — 3045F PR MOST RECENT HEMOGLOBIN A1C LEVEL 7.0-9.0%: CPT | Performed by: FAMILY MEDICINE

## 2019-07-22 ASSESSMENT — LIFESTYLE VARIABLES
HOW OFTEN DURING THE LAST YEAR HAVE YOU FAILED TO DO WHAT WAS NORMALLY EXPECTED FROM YOU BECAUSE OF DRINKING: 0
AUDIT-C TOTAL SCORE: 1
HOW OFTEN DURING THE LAST YEAR HAVE YOU FOUND THAT YOU WERE NOT ABLE TO STOP DRINKING ONCE YOU HAD STARTED: 0
HOW OFTEN DO YOU HAVE A DRINK CONTAINING ALCOHOL: 1
HOW OFTEN DURING THE LAST YEAR HAVE YOU NEEDED AN ALCOHOLIC DRINK FIRST THING IN THE MORNING TO GET YOURSELF GOING AFTER A NIGHT OF HEAVY DRINKING: 0
HAS A RELATIVE, FRIEND, DOCTOR, OR ANOTHER HEALTH PROFESSIONAL EXPRESSED CONCERN ABOUT YOUR DRINKING OR SUGGESTED YOU CUT DOWN: 0
HOW MANY STANDARD DRINKS CONTAINING ALCOHOL DO YOU HAVE ON A TYPICAL DAY: 0
HOW OFTEN DURING THE LAST YEAR HAVE YOU HAD A FEELING OF GUILT OR REMORSE AFTER DRINKING: 0
HOW OFTEN DO YOU HAVE SIX OR MORE DRINKS ON ONE OCCASION: 0
HAVE YOU OR SOMEONE ELSE BEEN INJURED AS A RESULT OF YOUR DRINKING: 0
HOW OFTEN DURING THE LAST YEAR HAVE YOU BEEN UNABLE TO REMEMBER WHAT HAPPENED THE NIGHT BEFORE BECAUSE YOU HAD BEEN DRINKING: 0
AUDIT TOTAL SCORE: 1

## 2019-07-22 ASSESSMENT — PATIENT HEALTH QUESTIONNAIRE - PHQ9
SUM OF ALL RESPONSES TO PHQ QUESTIONS 1-9: 0
SUM OF ALL RESPONSES TO PHQ QUESTIONS 1-9: 0

## 2019-07-22 NOTE — PROGRESS NOTES
Review of Systems   HENT: Positive for hearing loss (would like referral to ENT). Eyes: Positive for visual disturbance. Prior to Visit Medications    Medication Sig Taking? Authorizing Provider   fluticasone (FLONASE) 50 MCG/ACT nasal spray USE 1 SPRAY(S) IN EACH NOSTRIL ONCE DAILY Yes Ginette Waite MD   acarbose (PRECOSE) 100 MG tablet TAKE 1 TABLET BY MOUTH THREE TIMES DAILY WITH MEALS Yes Ginette Waite MD   insulin 70-30 (NOVOLIN 70/30) (70-30) 100 UNIT per ML injection vial Inject 25 units in the morning and 10-15 units nightly Yes Ginette Waite MD   Insulin Syringe-Needle U-100 (INSULIN SYRINGE .5CC/31GX5/16\") 31G X 5/16\" 0.5 ML MISC 1 each by Does not apply route 4 times daily Yes Ginette Waite MD   metFORMIN (GLUCOPHAGE-XR) 500 MG extended release tablet Take 4 tablets by mouth daily (with breakfast)  Patient taking differently: Take 750 mg by mouth daily (with breakfast) Indications: 2 tablets  Yes Ginette Waite MD   Lancets MISC Dx 250.02, use to test blood glucose 7 times daily Yes Ginette Waite MD   blood glucose monitor strips Test 7 times a day & as needed for symptoms of irregular blood glucose. Freestyle Strips per patient.  Dx: E11.65 Yes Ginette Waite MD   Alcohol Swabs (ALCOHOL PADS) 70 % PADS 1 each by Does not apply route 2 times daily Yes Ginette Waite MD   albuterol (PROVENTIL) (2.5 MG/3ML) 0.083% nebulizer solution Take 3 mLs by nebulization every 4 hours as needed for Wheezing or Shortness of Breath Yes Ginette Waite MD   Multiple Vitamins-Minerals (THERAPEUTIC MULTIVITAMIN-MINERALS) tablet Take 1 tablet by mouth daily Yes Historical Provider, MD   atorvastatin (LIPITOR) 40 MG tablet TAKE 1 TABLET BY MOUTH ONE TIME A DAY Yes Ginette Waite MD   etodolac (LODINE) 300 MG capsule Take 1 capsule by mouth every 8 hours Yes Ginette Waite MD   losartan (COZAAR) 25 MG tablet TAKE 1 TABLET BY MOUTH ONCE DAILY Yes Ginette Waite MD   ranitidine (ZANTAC) 150 MG tablet TAKE ONE

## 2019-07-22 NOTE — PATIENT INSTRUCTIONS
Activity: Your Everyday Guide\" from [employer]. Call 6-120.816.9449 or search [employer] online. You need 6749-7580 mg of calcium and 7692-3926 IU of vitamin D per day. It is possible to meet your calcium requirement with diet alone, but a vitamin D supplement is usually necessary to meet this goal.  When exposed to the sun, use a sunscreen that protects against both UVA and UVB radiation with an SPF of 30 or greater. Reapply every 2 to 3 hours or after sweating, drying off with a towel, or swimming. Always wear a seat belt when traveling in a car. Always wear a helmet when riding a bicycle or motorcycle.

## 2019-07-22 NOTE — CARE COORDINATION
Ambulatory Care Coordination Note  7/22/2019  CM Risk Score: 1  Charlson 10 Year Mortality Risk Score: 79%     ACC: Noemy Almodovar RN    Summary Note: Met with Tracee Cain after his appointment with Dr Ca Shaffer. His A1C today is 7.8. He denies any concerns at this time. His wife states he does fine unless \"he crashes because he goes without eating\". Reviewed eating routinely and how eating every 4 hours while awake will keep his blood sugar more stable. Also discussed checking blood sugar when he feels it has dropped and how to manage hypoglycemia. Tracee Cain states \"I know what to do\". He denies any needs from care management at this time. Discussed graduating from care coordination and he voices he is comfortable with that. He states he will call the office or send a message via [employer] [facility] [employer]. Contact information given. Care Coordination Interventions    Program Enrollment:  Rising Risk  Referral from Primary Care Provider:  Yes  Suggested Interventions and Community Resources         Goals Addressed                 This Visit's Progress     Self Monitoring   No change     Self-Monitored Blood Glucose - I will check my blood sugar blood sugars ac & HS  I will notify my provider if I have any blood sugar readings less than 70 more than 2 times a month. [place] [facility]       Barriers: none  Plan for overcoming my barriers: N/A  Confidence: None given/10  Anticipated Goal Completion Date: 12/1/2018    11/15/2018 - tracking BGs    6/20/19: [place] BS [place] in [place]. Prior to Admission medications    Medication Sig Start Date End Date Taking?  Authorizing Provider   fluticasone (FLONASE) 50 MCG/ACT nasal spray USE 1 SPRAY(S) IN EACH NOSTRIL ONCE DAILY 7/18/19   Ginette Waite MD   acarbose (PRECOSE) 100 MG tablet TAKE 1 TABLET BY MOUTH THREE TIMES DAILY WITH MEALS 5/14/19   Ginette Waite MD   insulin 70-30 (NOVOLIN 70/30) (70-30) 100 UNIT per ML injection vial Inject 25 units in the morning and

## 2019-08-18 ENCOUNTER — PATIENT MESSAGE (OUTPATIENT)
Dept: FAMILY MEDICINE CLINIC | Age: 69
End: 2019-08-18

## 2019-08-19 NOTE — TELEPHONE ENCOUNTER
From: Valarie Acuna  To: Izabela Hernandez MD  Sent: 8/18/2019 12:03 PM EDT  Subject: Prescription Question    Good Morning    Got a letter from CMS ? ?? questioning the amount of Lancets, syringes and alcohol swabs as excessive because not being treated with insulin, for some reason they are denying to pay for said items.  they are not seeing insulin going through, we have been buying over the counter    Would you please submit a prescription for the Novalin 70/30 to [facility]    Thank you    Ashu Landin

## 2019-10-14 RX ORDER — METFORMIN HYDROCHLORIDE 750 MG/1
TABLET, EXTENDED RELEASE ORAL
Qty: 180 TABLET | Refills: 0 | Status: SHIPPED | OUTPATIENT
Start: 2019-10-14 | End: 2019-10-21 | Stop reason: SDUPTHER

## 2019-10-14 RX ORDER — LOSARTAN POTASSIUM 25 MG/1
TABLET ORAL
Qty: 90 TABLET | Refills: 0 | Status: SHIPPED | OUTPATIENT
Start: 2019-10-14 | End: 2019-10-21 | Stop reason: SDUPTHER

## 2019-10-14 RX ORDER — RANITIDINE 150 MG/1
TABLET ORAL
Qty: 180 TABLET | Refills: 0 | OUTPATIENT
Start: 2019-10-14

## 2019-10-15 RX ORDER — RANITIDINE 150 MG/1
TABLET ORAL
Qty: 180 TABLET | Refills: 3 | Status: SHIPPED | OUTPATIENT
Start: 2019-10-15 | End: 2019-10-21 | Stop reason: SDUPTHER

## 2019-10-21 ENCOUNTER — OFFICE VISIT (OUTPATIENT)
Dept: FAMILY MEDICINE CLINIC | Age: 69
End: 2019-10-21
Payer: MEDICARE

## 2019-10-21 VITALS
WEIGHT: 220.6 LBS | SYSTOLIC BLOOD PRESSURE: 128 MMHG | DIASTOLIC BLOOD PRESSURE: 70 MMHG | BODY MASS INDEX: 35.45 KG/M2 | HEART RATE: 73 BPM | HEIGHT: 66 IN | OXYGEN SATURATION: 96 %

## 2019-10-21 DIAGNOSIS — Z23 FLU VACCINE NEED: ICD-10-CM

## 2019-10-21 LAB — HBA1C MFR BLD: 9.1 %

## 2019-10-21 PROCEDURE — 4040F PNEUMOC VAC/ADMIN/RCVD: CPT | Performed by: FAMILY MEDICINE

## 2019-10-21 PROCEDURE — 3017F COLORECTAL CA SCREEN DOC REV: CPT | Performed by: FAMILY MEDICINE

## 2019-10-21 PROCEDURE — 3046F HEMOGLOBIN A1C LEVEL >9.0%: CPT | Performed by: FAMILY MEDICINE

## 2019-10-21 PROCEDURE — 83036 HEMOGLOBIN GLYCOSYLATED A1C: CPT | Performed by: FAMILY MEDICINE

## 2019-10-21 PROCEDURE — G8482 FLU IMMUNIZE ORDER/ADMIN: HCPCS | Performed by: FAMILY MEDICINE

## 2019-10-21 PROCEDURE — G0008 ADMIN INFLUENZA VIRUS VAC: HCPCS | Performed by: FAMILY MEDICINE

## 2019-10-21 PROCEDURE — 1036F TOBACCO NON-USER: CPT | Performed by: FAMILY MEDICINE

## 2019-10-21 PROCEDURE — 99213 OFFICE O/P EST LOW 20 MIN: CPT | Performed by: FAMILY MEDICINE

## 2019-10-21 PROCEDURE — G8417 CALC BMI ABV UP PARAM F/U: HCPCS | Performed by: FAMILY MEDICINE

## 2019-10-21 PROCEDURE — 1123F ACP DISCUSS/DSCN MKR DOCD: CPT | Performed by: FAMILY MEDICINE

## 2019-10-21 PROCEDURE — G8427 DOCREV CUR MEDS BY ELIG CLIN: HCPCS | Performed by: FAMILY MEDICINE

## 2019-10-21 PROCEDURE — 90653 IIV ADJUVANT VACCINE IM: CPT | Performed by: FAMILY MEDICINE

## 2019-10-21 PROCEDURE — 2022F DILAT RTA XM EVC RTNOPTHY: CPT | Performed by: FAMILY MEDICINE

## 2019-10-21 RX ORDER — LOSARTAN POTASSIUM 25 MG/1
TABLET ORAL
Qty: 90 TABLET | Refills: 3 | Status: SHIPPED | OUTPATIENT
Start: 2019-10-21 | End: 2020-01-24 | Stop reason: SDUPTHER

## 2019-10-21 RX ORDER — ATORVASTATIN CALCIUM 40 MG/1
TABLET, FILM COATED ORAL
Qty: 90 TABLET | Refills: 3 | Status: SHIPPED | OUTPATIENT
Start: 2019-10-21 | End: 2020-01-24 | Stop reason: SDUPTHER

## 2019-10-21 RX ORDER — FLUTICASONE PROPIONATE 50 MCG
SPRAY, SUSPENSION (ML) NASAL
Qty: 3 BOTTLE | Refills: 3 | Status: SHIPPED | OUTPATIENT
Start: 2019-10-21 | End: 2020-01-24 | Stop reason: SDUPTHER

## 2019-10-21 RX ORDER — TADALAFIL 5 MG/1
5 TABLET ORAL DAILY PRN
Qty: 30 TABLET | Refills: 5 | Status: SHIPPED | OUTPATIENT
Start: 2019-10-21 | End: 2020-05-01 | Stop reason: ALTCHOICE

## 2019-10-21 RX ORDER — ACARBOSE 100 MG/1
TABLET ORAL
Qty: 90 TABLET | Refills: 3 | Status: SHIPPED | OUTPATIENT
Start: 2019-10-21 | End: 2020-01-24 | Stop reason: SDUPTHER

## 2019-10-21 RX ORDER — METFORMIN HYDROCHLORIDE 500 MG/1
2000 TABLET, EXTENDED RELEASE ORAL
Qty: 120 TABLET | Refills: 1 | Status: CANCELLED | OUTPATIENT
Start: 2019-10-21

## 2019-10-21 RX ORDER — METFORMIN HYDROCHLORIDE 750 MG/1
TABLET, EXTENDED RELEASE ORAL
Qty: 180 TABLET | Refills: 3 | Status: SHIPPED | OUTPATIENT
Start: 2019-10-21 | End: 2020-01-24 | Stop reason: SDUPTHER

## 2019-10-21 RX ORDER — RANITIDINE 150 MG/1
TABLET ORAL
Qty: 180 TABLET | Refills: 3 | Status: SHIPPED | OUTPATIENT
Start: 2019-10-21 | End: 2020-01-24 | Stop reason: SDUPTHER

## 2019-11-11 ENCOUNTER — OFFICE VISIT (OUTPATIENT)
Dept: DERMATOLOGY | Age: 69
End: 2019-11-11
Payer: MEDICARE

## 2019-11-11 DIAGNOSIS — L82.1 SEBORRHEIC KERATOSES: ICD-10-CM

## 2019-11-11 DIAGNOSIS — L57.0 ACTINIC KERATOSES: Primary | ICD-10-CM

## 2019-11-11 DIAGNOSIS — Z12.83 SCREENING EXAM FOR SKIN CANCER: ICD-10-CM

## 2019-11-11 PROCEDURE — G8482 FLU IMMUNIZE ORDER/ADMIN: HCPCS | Performed by: DERMATOLOGY

## 2019-11-11 PROCEDURE — 1123F ACP DISCUSS/DSCN MKR DOCD: CPT | Performed by: DERMATOLOGY

## 2019-11-11 PROCEDURE — G8427 DOCREV CUR MEDS BY ELIG CLIN: HCPCS | Performed by: DERMATOLOGY

## 2019-11-11 PROCEDURE — 3017F COLORECTAL CA SCREEN DOC REV: CPT | Performed by: DERMATOLOGY

## 2019-11-11 PROCEDURE — 4040F PNEUMOC VAC/ADMIN/RCVD: CPT | Performed by: DERMATOLOGY

## 2019-11-11 PROCEDURE — 99213 OFFICE O/P EST LOW 20 MIN: CPT | Performed by: DERMATOLOGY

## 2019-11-11 PROCEDURE — G8417 CALC BMI ABV UP PARAM F/U: HCPCS | Performed by: DERMATOLOGY

## 2019-11-11 PROCEDURE — 17003 DESTRUCT PREMALG LES 2-14: CPT | Performed by: DERMATOLOGY

## 2019-11-11 PROCEDURE — 1036F TOBACCO NON-USER: CPT | Performed by: DERMATOLOGY

## 2019-11-11 PROCEDURE — 17000 DESTRUCT PREMALG LESION: CPT | Performed by: DERMATOLOGY

## 2019-11-23 ENCOUNTER — PATIENT MESSAGE (OUTPATIENT)
Dept: FAMILY MEDICINE CLINIC | Age: 69
End: 2019-11-23

## 2019-11-25 RX ORDER — NAPROXEN SODIUM 220 MG
1 TABLET ORAL 4 TIMES DAILY
Qty: 200 EACH | Refills: 3 | Status: SHIPPED | OUTPATIENT
Start: 2019-11-25 | End: 2020-01-24 | Stop reason: SDUPTHER

## 2020-01-13 ENCOUNTER — PATIENT MESSAGE (OUTPATIENT)
Dept: FAMILY MEDICINE CLINIC | Age: 70
End: 2020-01-13

## 2020-01-24 ENCOUNTER — HOSPITAL ENCOUNTER (OUTPATIENT)
Dept: GENERAL RADIOLOGY | Age: 70
Discharge: HOME | End: 2020-01-24
Payer: MEDICARE

## 2020-01-24 ENCOUNTER — HOSPITAL ENCOUNTER (OUTPATIENT)
Age: 70
Discharge: HOME | End: 2020-01-24
Payer: MEDICARE

## 2020-01-24 ENCOUNTER — OFFICE VISIT (OUTPATIENT)
Dept: FAMILY MEDICINE CLINIC | Age: 70
End: 2020-01-24
Payer: MEDICARE

## 2020-01-24 VITALS
BODY MASS INDEX: 36.38 KG/M2 | HEIGHT: 66 IN | HEART RATE: 62 BPM | WEIGHT: 226.4 LBS | SYSTOLIC BLOOD PRESSURE: 134 MMHG | DIASTOLIC BLOOD PRESSURE: 70 MMHG | OXYGEN SATURATION: 97 %

## 2020-01-24 LAB
A/G RATIO: 1.8 (ref 1.1–2.2)
ALBUMIN SERPL-MCNC: 4.4 G/DL (ref 3.4–5)
ALP BLD-CCNC: 99 U/L (ref 40–129)
ALT SERPL-CCNC: 32 U/L (ref 10–40)
ANION GAP SERPL CALCULATED.3IONS-SCNC: 13 MMOL/L (ref 3–16)
AST SERPL-CCNC: 25 U/L (ref 15–37)
BASOPHILS ABSOLUTE: 0 K/UL (ref 0–0.2)
BASOPHILS RELATIVE PERCENT: 1.1 %
BILIRUB SERPL-MCNC: 0.4 MG/DL (ref 0–1)
BUN BLDV-MCNC: 10 MG/DL (ref 7–20)
CALCIUM SERPL-MCNC: 9.5 MG/DL (ref 8.3–10.6)
CHLORIDE BLD-SCNC: 100 MMOL/L (ref 99–110)
CHOLESTEROL, TOTAL: 150 MG/DL (ref 0–199)
CO2: 27 MMOL/L (ref 21–32)
CREAT SERPL-MCNC: 0.8 MG/DL (ref 0.8–1.3)
EOSINOPHILS ABSOLUTE: 0.2 K/UL (ref 0–0.6)
EOSINOPHILS RELATIVE PERCENT: 5.6 %
FOLATE: >20 NG/ML (ref 4.78–24.2)
GFR AFRICAN AMERICAN: >60
GFR NON-AFRICAN AMERICAN: >60
GLOBULIN: 2.4 G/DL
GLUCOSE BLD-MCNC: 186 MG/DL (ref 70–99)
HBA1C MFR BLD: 8.8 %
HCT VFR BLD CALC: 41.9 % (ref 40.5–52.5)
HDLC SERPL-MCNC: 52 MG/DL (ref 40–60)
HEMOGLOBIN: 14.3 G/DL (ref 13.5–17.5)
LDL CHOLESTEROL CALCULATED: 79 MG/DL
LYMPHOCYTES ABSOLUTE: 1.5 K/UL (ref 1–5.1)
LYMPHOCYTES RELATIVE PERCENT: 36.3 %
MCH RBC QN AUTO: 30 PG (ref 26–34)
MCHC RBC AUTO-ENTMCNC: 34.1 G/DL (ref 31–36)
MCV RBC AUTO: 88 FL (ref 80–100)
MONOCYTES ABSOLUTE: 0.4 K/UL (ref 0–1.3)
MONOCYTES RELATIVE PERCENT: 9.2 %
NEUTROPHILS ABSOLUTE: 2 K/UL (ref 1.7–7.7)
NEUTROPHILS RELATIVE PERCENT: 47.8 %
PDW BLD-RTO: 13.8 % (ref 12.4–15.4)
PLATELET # BLD: 247 K/UL (ref 135–450)
PMV BLD AUTO: 8.6 FL (ref 5–10.5)
POTASSIUM SERPL-SCNC: 4.2 MMOL/L (ref 3.5–5.1)
PROSTATE SPECIFIC ANTIGEN: 14.35 NG/ML (ref 0–4)
RBC # BLD: 4.77 M/UL (ref 4.2–5.9)
SODIUM BLD-SCNC: 140 MMOL/L (ref 136–145)
TOTAL PROTEIN: 6.8 G/DL (ref 6.4–8.2)
TRIGL SERPL-MCNC: 93 MG/DL (ref 0–150)
TSH REFLEX: 2.06 UIU/ML (ref 0.27–4.2)
VITAMIN B-12: 915 PG/ML (ref 211–911)
VLDLC SERPL CALC-MCNC: 19 MG/DL
WBC # BLD: 4.2 K/UL (ref 4–11)

## 2020-01-24 PROCEDURE — 2022F DILAT RTA XM EVC RTNOPTHY: CPT | Performed by: FAMILY MEDICINE

## 2020-01-24 PROCEDURE — 3017F COLORECTAL CA SCREEN DOC REV: CPT | Performed by: FAMILY MEDICINE

## 2020-01-24 PROCEDURE — 1036F TOBACCO NON-USER: CPT | Performed by: FAMILY MEDICINE

## 2020-01-24 PROCEDURE — G8482 FLU IMMUNIZE ORDER/ADMIN: HCPCS | Performed by: FAMILY MEDICINE

## 2020-01-24 PROCEDURE — G8417 CALC BMI ABV UP PARAM F/U: HCPCS | Performed by: FAMILY MEDICINE

## 2020-01-24 PROCEDURE — 99215 OFFICE O/P EST HI 40 MIN: CPT | Performed by: FAMILY MEDICINE

## 2020-01-24 PROCEDURE — 4040F PNEUMOC VAC/ADMIN/RCVD: CPT | Performed by: FAMILY MEDICINE

## 2020-01-24 PROCEDURE — 71046 X-RAY EXAM CHEST 2 VIEWS: CPT

## 2020-01-24 PROCEDURE — G8427 DOCREV CUR MEDS BY ELIG CLIN: HCPCS | Performed by: FAMILY MEDICINE

## 2020-01-24 PROCEDURE — 83036 HEMOGLOBIN GLYCOSYLATED A1C: CPT | Performed by: FAMILY MEDICINE

## 2020-01-24 PROCEDURE — 1123F ACP DISCUSS/DSCN MKR DOCD: CPT | Performed by: FAMILY MEDICINE

## 2020-01-24 RX ORDER — RANITIDINE 150 MG/1
TABLET ORAL
Qty: 180 TABLET | Refills: 3 | Status: SHIPPED | OUTPATIENT
Start: 2020-01-24 | End: 2020-11-16 | Stop reason: SDUPTHER

## 2020-01-24 RX ORDER — ATORVASTATIN CALCIUM 40 MG/1
TABLET, FILM COATED ORAL
Qty: 90 TABLET | Refills: 3 | Status: SHIPPED | OUTPATIENT
Start: 2020-01-24 | End: 2020-11-16 | Stop reason: SDUPTHER

## 2020-01-24 RX ORDER — LOSARTAN POTASSIUM 25 MG/1
TABLET ORAL
Qty: 90 TABLET | Refills: 3 | Status: SHIPPED | OUTPATIENT
Start: 2020-01-24 | End: 2020-11-16 | Stop reason: SDUPTHER

## 2020-01-24 RX ORDER — NAPROXEN SODIUM 220 MG
1 TABLET ORAL 4 TIMES DAILY
Qty: 200 EACH | Refills: 3 | Status: SHIPPED | OUTPATIENT
Start: 2020-01-24 | End: 2021-03-09 | Stop reason: SDUPTHER

## 2020-01-24 RX ORDER — METFORMIN HYDROCHLORIDE 750 MG/1
TABLET, EXTENDED RELEASE ORAL
Qty: 180 TABLET | Refills: 3 | Status: SHIPPED | OUTPATIENT
Start: 2020-01-24 | End: 2020-11-16 | Stop reason: SDUPTHER

## 2020-01-24 RX ORDER — LANCETS 30 GAUGE
EACH MISCELLANEOUS
Qty: 500 EACH | Refills: 3 | Status: SHIPPED | OUTPATIENT
Start: 2020-01-24 | End: 2021-03-09 | Stop reason: SDUPTHER

## 2020-01-24 RX ORDER — PANTOPRAZOLE SODIUM 20 MG/1
20 TABLET, DELAYED RELEASE ORAL
Qty: 30 TABLET | Refills: 5 | Status: SHIPPED | OUTPATIENT
Start: 2020-01-24 | End: 2020-11-16 | Stop reason: SDUPTHER

## 2020-01-24 RX ORDER — GLUCOSAMINE HCL/CHONDROITIN SU 500-400 MG
CAPSULE ORAL
Qty: 500 STRIP | Refills: 3 | Status: SHIPPED | OUTPATIENT
Start: 2020-01-24 | End: 2021-03-10 | Stop reason: SDUPTHER

## 2020-01-24 RX ORDER — ACARBOSE 100 MG/1
TABLET ORAL
Qty: 90 TABLET | Refills: 3 | Status: SHIPPED | OUTPATIENT
Start: 2020-01-24 | End: 2020-11-16 | Stop reason: SDUPTHER

## 2020-01-24 RX ORDER — FLUTICASONE PROPIONATE 50 MCG
SPRAY, SUSPENSION (ML) NASAL
Qty: 3 BOTTLE | Refills: 3 | Status: SHIPPED | OUTPATIENT
Start: 2020-01-24 | End: 2020-11-16 | Stop reason: SDUPTHER

## 2020-01-24 ASSESSMENT — PATIENT HEALTH QUESTIONNAIRE - PHQ9
SUM OF ALL RESPONSES TO PHQ QUESTIONS 1-9: 0
SUM OF ALL RESPONSES TO PHQ9 QUESTIONS 1 & 2: 0
2. FEELING DOWN, DEPRESSED OR HOPELESS: 0
SUM OF ALL RESPONSES TO PHQ QUESTIONS 1-9: 0
1. LITTLE INTEREST OR PLEASURE IN DOING THINGS: 0

## 2020-01-26 ASSESSMENT — ENCOUNTER SYMPTOMS
SHORTNESS OF BREATH: 0
RHINORRHEA: 0
VISUAL CHANGE: 1
SORE THROAT: 0
CONSTIPATION: 0
ABDOMINAL PAIN: 0
COUGH: 1
BLURRED VISION: 0
DIARRHEA: 0
WHEEZING: 0

## 2020-01-26 NOTE — PROGRESS NOTES
hadn't heard anything until now. Would like PSA rechecked today. Review of Systems   Constitutional: Positive for fatigue. HENT: Negative for postnasal drip, rhinorrhea and sore throat. Eyes: Negative for blurred vision. Respiratory: Positive for cough. Negative for shortness of breath and wheezing. Cardiovascular: Negative for chest pain. Gastrointestinal: Negative for abdominal pain, constipation and diarrhea. Has had episodes of nocturnal heartburn that sometimes wake him up from sleep   Genitourinary: Negative for difficulty urinating. Usually urinates about once per night   Allergic/Immunologic: Negative for environmental allergies (has been tested previously, not present). Neurological: Positive for numbness. Negative for headaches. Psychiatric/Behavioral: Positive for sleep disturbance (awakens about twice per night, sometimes from heartburn, sometimes to urinate. Wife denies snoring or breathing problem while sleeping). Negative for dysphoric mood. Prior to Visit Medications    Medication Sig Taking? Authorizing Provider   acarbose (PRECOSE) 100 MG tablet TAKE 1 TABLET BY MOUTH THREE TIMES DAILY WITH MEALS Yes Som Orellana MD   atorvastatin (LIPITOR) 40 MG tablet TAKE 1 TABLET BY MOUTH ONE TIME A DAY Yes Som Orellana MD   blood glucose monitor strips Test 7 times a day & as needed for symptoms of irregular blood glucose. Freestyle Strips per patient.  Dx: E11.65 Yes Som Orellana MD   fluticasone (FLONASE) 50 MCG/ACT nasal spray USE 1 SPRAY(S) IN EACH NOSTRIL ONCE DAILY Yes Som Orellana MD   insulin 70-30 (NOVOLIN 70/30) (70-30) 100 UNIT per ML injection vial Inject 25 units in the morning and 10-15 units nightly Yes Som Orellana MD   Insulin Syringe-Needle U-100 (INSULIN SYRINGE .5CC/31GX5/16\") 31G X 5/16\" 0.5 ML MISC 1 each by Does not apply route 4 times daily Yes Som Orellana MD   Lancets MISC Dx 250.02, use to test blood glucose 5 times daily Yes Lizzy RIBEIRO Weight: 226 lb 6.4 oz (102.7 kg)   Height: 5' 6\" (1.676 m)     Estimated body mass index is 36.54 kg/m² as calculated from the following:    Height as of this encounter: 5' 6\" (1.676 m). Weight as of this encounter: 226 lb 6.4 oz (102.7 kg). Physical Exam  Vitals signs and nursing note reviewed. Constitutional:       General: He is not in acute distress. Appearance: Normal appearance. He is well-developed. He is not diaphoretic. HENT:      Head: Normocephalic and atraumatic. Right Ear: Hearing, tympanic membrane, ear canal and external ear normal. No drainage or tenderness. Left Ear: Hearing, tympanic membrane, ear canal and external ear normal. No drainage or tenderness. Nose: Nose normal. No mucosal edema or rhinorrhea. Right Sinus: No maxillary sinus tenderness or frontal sinus tenderness. Left Sinus: No maxillary sinus tenderness or frontal sinus tenderness. Mouth/Throat:      Pharynx: Uvula midline. No oropharyngeal exudate or posterior oropharyngeal erythema. Eyes:      General: Lids are normal. No scleral icterus. Right eye: No discharge. Left eye: No discharge. Conjunctiva/sclera: Conjunctivae normal.      Pupils: Pupils are equal, round, and reactive to light. Neck:      Musculoskeletal: Full passive range of motion without pain, normal range of motion and neck supple. Thyroid: No thyroid mass or thyromegaly. Vascular: No JVD. Trachea: Trachea normal.   Cardiovascular:      Rate and Rhythm: Normal rate and regular rhythm. Pulses: Normal pulses. Dorsalis pedis pulses are 2+ on the right side and 2+ on the left side. Posterior tibial pulses are 2+ on the right side and 2+ on the left side. Heart sounds: Normal heart sounds, S1 normal and S2 normal. No murmur. No friction rub. No gallop. Pulmonary:      Effort: Pulmonary effort is normal. No respiratory distress.       Breath sounds: Normal breath sounds. No wheezing or rales. Chest:      Chest wall: No tenderness. Abdominal:      General: Bowel sounds are normal. There is no distension. Palpations: Abdomen is soft. There is no mass. Tenderness: There is no abdominal tenderness. There is no guarding or rebound. Hernia: No hernia is present. Musculoskeletal:      Right foot: Normal range of motion. No deformity. Left foot: Normal range of motion. No deformity. Feet:    Feet:      Right foot:      Protective Sensation: 10 sites tested. 10 sites sensed. Skin integrity: No ulcer, blister, skin breakdown, erythema, warmth, callus or dry skin. Toenail Condition: Right toenails are normal.      Left foot:      Protective Sensation: 10 sites tested. 8 sites sensed. Skin integrity: No ulcer, blister, skin breakdown, erythema, warmth, callus or dry skin. Toenail Condition: Left toenails are normal.   Lymphadenopathy:      Head:      Right side of head: No submental, submandibular, preauricular, posterior auricular or occipital adenopathy. Left side of head: No submental, submandibular, preauricular, posterior auricular or occipital adenopathy. Cervical: No cervical adenopathy. Upper Body:      Right upper body: No supraclavicular adenopathy. Left upper body: No supraclavicular adenopathy. Skin:     General: Skin is warm and dry. Findings: No erythema, lesion or rash. Nails: There is no clubbing. Neurological:      General: No focal deficit present. Mental Status: He is alert. Cranial Nerves: No cranial nerve deficit. Sensory: No sensory deficit. Coordination: Coordination normal.      Gait: Gait normal.      Deep Tendon Reflexes: Reflexes are normal and symmetric. Reflex Scores:       Achilles reflexes are 2+ on the right side and 2+ on the left side.      Comments: Normal proprioception of big toes bilaterally   Psychiatric:         Mood and Affect: Mood normal.         Speech: Speech normal.         Behavior: Behavior normal. Behavior is cooperative. Thought Content: Thought content normal.          Results for POC orders placed in visit on 01/24/20   POCT glycosylated hemoglobin (Hb A1C)   Result Value Ref Range    Hemoglobin A1C 8.8 %        ASSESSMENT/PLAN:  1. Uncontrolled type 2 diabetes mellitus with retinopathy, with long-term current use of insulin (HCC)  Improving slowly. Encouraged to begin reporting blood glucoses to me again so we may adjust his medication accordingly. Discussed goal for next visit is to be below 8.5%. Overall goal, given poor control, would be around 7.5%, which we discussed. Discussed general issues about diabetes pathophysiology and management. Counseling at today's visit: discussed the need for weight loss, focused on the need for regular aerobic exercise and discussed the advantages of a diet low in carbohydrates. Educational material distributed. Addressed ADA diet. Reminded to bring in blood sugar diary at next visit. - POCT glycosylated hemoglobin (Hb A1C)  - blood glucose monitor strips; Test 7 times a day & as needed for symptoms of irregular blood glucose. Freestyle Strips per patient. Dx: E11.65  Dispense: 500 strip; Refill: 3  - insulin 70-30 (NOVOLIN 70/30) (70-30) 100 UNIT per ML injection vial; Inject 25 units in the morning and 10-15 units nightly  Dispense: 2 vial; Refill: 3  - Insulin Syringe-Needle U-100 (INSULIN SYRINGE .5CC/31GX5/16\") 31G X 5/16\" 0.5 ML MISC; 1 each by Does not apply route 4 times daily  Dispense: 200 each; Refill: 3  - Lancets MISC; Dx 250.02, use to test blood glucose 5 times daily  Dispense: 500 each; Refill: 3  - TSH with Reflex  - Lipid Panel  - Folate  - Vitamin B12    2. Chronic cough  Discussed possible causes, such as respiratory allergens, asthma/COPD, GERD. Not thought to be from meds as changing ACE to ARB did not improve it.  Will do empiric trial of PPI as he has had heartburn symptoms. Will get repeat CXR as it has been several years since his last one. Will consider PFT testing if all is negative and PPI does not help. - CBC Auto Differential  - Comprehensive Metabolic Panel  - RESPIRATORY ALLERGEN PROFILE  - XR CHEST STANDARD (2 VW); Future  - pantoprazole (PROTONIX) 20 MG tablet; Take 1 tablet by mouth every morning (before breakfast)  Dispense: 30 tablet; Refill: 5    3. Elevated PSA  Encouraged follow up with Dr. Dorie ARCHER. I will see if we can get records and labs results from his last appointment there. Recheck PSA today per patient's request.   - Psa screening    4. Hypersomnia  Could be shift in sleep schedule from staying up later. Advise to begin to wake up earlier and go to bed at an earlier hour to see if that helps. May use melatonin at night if needed. Depression screening negative. Given body habitus, concerns for VONDA, but refuses testing as he doesn't feel it is this and doesn't think that is necessary.   - CBC Auto Differential  - Comprehensive Metabolic Panel  - TSH with Reflex  - Folate  - Vitamin B12    5. Colon cancer screening  - Marie Diaz MD, Gastroenterology, [facility]  Encouraged to schedule screening colonoscopy. No follow-ups on file. An electronic signature was used to authenticate this note.     --Saurav Taveras MD on 1/26/2020 at 7:44 AM

## 2020-01-27 LAB
2000687N OAK TREE IGE: <0.1 KU/L
ALLERGEN ASPERGILLUS FUMIGATUS IGE: <0.1 KU/L
ALLERGEN BERMUDA GRASS IGE: <0.1 KU/L
ALLERGEN BIRCH IGE: <0.1 KU/L
ALLERGEN CAT DANDER IGE: <0.1 KU/L
ALLERGEN COMMON SHORT RAGWEED IGE: <0.1 KU/L
ALLERGEN COTTONWOOD: <0.1 KU/L
ALLERGEN DOG DANDER IGE: <0.1 KU/L
ALLERGEN ELM IGE: <0.1 KU/L
ALLERGEN FUNGI/MOLD M.RACEMOSUS IGE: <0.1 KU/L
ALLERGEN GERMAN COCKROACH IGE: <0.1 KU/L
ALLERGEN HORMODENDRUM HORDEI IGE: <0.1 KU/L
ALLERGEN MAPLE/BOX ELDER IGE: <0.1 KU/L
ALLERGEN MITE DUST FARINAE IGE: <0.1 KU/L
ALLERGEN MITE DUST PTERONYSSINUS IGE: <0.1 KU/L
ALLERGEN MOUNTAIN CEDAR: <0.1 KU/L
ALLERGEN MOUSE EPITHELIA IGE: <0.1 KU/L
ALLERGEN PECAN TREE IGE: <0.1 KU/L
ALLERGEN PENICILLIUM NOTATUM: <0.1 KU/L
ALLERGEN ROUGH PIGWEED (W14) IGE: <0.1 KU/L
ALLERGEN RUSSIAN THISTLE IGE: <0.1 KU/L
ALLERGEN SEE NOTE: NORMAL
ALLERGEN SHEEP SORREL (W18) IGE: <0.1 KU/L
ALLERGEN TIMOTHY GRASS: <0.1 KU/L
ALLERGEN TREE SYCAMORE: <0.1 KU/L
ALLERGEN WALNUT TREE IGE: <0.1 KU/L
ALLERGEN WHITE MULBERRY TREE, IGE: <0.1 KU/L
ALLERGEN, TREE, WHITE ASH IGE: <0.1 KU/L
IGE: 26 KU/L

## 2020-02-13 ENCOUNTER — NURSE ONLY (OUTPATIENT)
Dept: FAMILY MEDICINE CLINIC | Age: 70
End: 2020-02-13
Payer: MEDICARE

## 2020-02-13 LAB
CONTROL: NORMAL
HEMOCCULT STL QL: NORMAL

## 2020-02-13 PROCEDURE — 82274 ASSAY TEST FOR BLOOD FECAL: CPT | Performed by: FAMILY MEDICINE

## 2020-05-01 ENCOUNTER — TELEMEDICINE (OUTPATIENT)
Dept: FAMILY MEDICINE CLINIC | Age: 70
End: 2020-05-01
Payer: MEDICARE

## 2020-05-01 VITALS
BODY MASS INDEX: 35.83 KG/M2 | HEART RATE: 64 BPM | WEIGHT: 222 LBS | DIASTOLIC BLOOD PRESSURE: 83 MMHG | SYSTOLIC BLOOD PRESSURE: 139 MMHG

## 2020-05-01 PROCEDURE — 3017F COLORECTAL CA SCREEN DOC REV: CPT | Performed by: FAMILY MEDICINE

## 2020-05-01 PROCEDURE — 1123F ACP DISCUSS/DSCN MKR DOCD: CPT | Performed by: FAMILY MEDICINE

## 2020-05-01 PROCEDURE — G8427 DOCREV CUR MEDS BY ELIG CLIN: HCPCS | Performed by: FAMILY MEDICINE

## 2020-05-01 PROCEDURE — 99213 OFFICE O/P EST LOW 20 MIN: CPT | Performed by: FAMILY MEDICINE

## 2020-05-01 PROCEDURE — 2022F DILAT RTA XM EVC RTNOPTHY: CPT | Performed by: FAMILY MEDICINE

## 2020-05-01 PROCEDURE — 3052F HG A1C>EQUAL 8.0%<EQUAL 9.0%: CPT | Performed by: FAMILY MEDICINE

## 2020-05-01 PROCEDURE — 4040F PNEUMOC VAC/ADMIN/RCVD: CPT | Performed by: FAMILY MEDICINE

## 2020-05-02 RX ORDER — SILDENAFIL CITRATE 20 MG/1
60 TABLET ORAL PRN
Qty: 90 TABLET | Refills: 3 | Status: SHIPPED | OUTPATIENT
Start: 2020-05-02 | End: 2020-10-29

## 2020-05-02 NOTE — PROGRESS NOTES
2020    TELEHEALTH EVALUATION -- Audio/Visual (During QSE-92 public health emergency)    HPI:   Chief Complaint   Patient presents with    Diabetes     glucose checking daily         Varinder Kelley (:  1950) has requested an audio/video evaluation for the following concern(s):    Diabetes Mellitus Type 2: Current symptoms/problems include none. Medication compliance:  noncompliant: misses doses at night when he has long work days, mostly because he either forgets to do it, or just doesn't feel like it  Diabetic diet compliance:  noncompliant: isn't really interesting changing his diet,  Weight trend: stable  Current exercise: no regular exercise  Barriers: lack of motivation, stress and time constraints    Home blood sugar records: fasting range: 100-230's  Any episodes of hypoglycemia? no  Eye exam current (within one year): no   reports that he has never smoked. He has never used smokeless tobacco.   Daily Aspirin? Yes  Known diabetic complications: retinopathy    Lab Results   Component Value Date    LABA1C 8.8 2020    LABA1C 9.1 10/21/2019    LABA1C 7.8 2019     Lab Results   Component Value Date    CREATININE 0.8 2020     Lab Results   Component Value Date    ALT 32 2020    AST 25 2020     No components found for: CHLPL  Lab Results   Component Value Date    TRIG 93 2020     Lab Results   Component Value Date    HDL 52 2020     Lab Results   Component Value Date    LDLCALC 79 2020           Review of Systems    Prior to Visit Medications    Medication Sig Taking?  Authorizing Provider   sildenafil (REVATIO) 20 MG tablet Take 3 tablets by mouth as needed (erectile dysfunction) Yes Nella Brewer MD   etodolac (LODINE) 300 MG capsule Take 1 capsule by mouth every 8 hours Yes Nella Brewer MD   insulin 70-30 (NOVOLIN 70/30) (70-30) 100 UNIT per ML injection vial Inject 25 units in the morning and 10-15 units nightly Yes MD Sai Schneider

## 2020-06-01 ENCOUNTER — PATIENT MESSAGE (OUTPATIENT)
Dept: FAMILY MEDICINE CLINIC | Age: 70
End: 2020-06-01

## 2020-06-02 ENCOUNTER — TELEPHONE (OUTPATIENT)
Dept: FAMILY MEDICINE CLINIC | Age: 70
End: 2020-06-02

## 2020-06-02 DIAGNOSIS — R97.20 ELEVATED PSA: ICD-10-CM

## 2020-06-04 ENCOUNTER — NURSE ONLY (OUTPATIENT)
Dept: FAMILY MEDICINE CLINIC | Age: 70
End: 2020-06-04

## 2020-06-04 LAB
PROSTATE SPECIFIC ANTIGEN FREE: 1.7 UG/L
PROSTATE SPECIFIC ANTIGEN PERCENT FREE: 14.2 %
PROSTATE SPECIFIC ANTIGEN: 12 UG/L (ref 0–4)

## 2020-06-25 ENCOUNTER — PATIENT MESSAGE (OUTPATIENT)
Dept: FAMILY MEDICINE CLINIC | Age: 70
End: 2020-06-25

## 2020-06-25 NOTE — TELEPHONE ENCOUNTER
From: Fozia Harper  To: Yelena Alcantar MD  Sent: 6/25/2020 4:33 PM EDT  Subject: Visit Follow-Up Question    The MRI was a bust today, tried two times to get it done and claustrophobia got the best of me. Tried doing without sedation and it doesn't work like years ago. MRI Tube is bigger but they have to get me in the tube to get the pictures. Going to reschedule as soon as we can. The technicians said to see if we can get a dose of Zanax it is what they recommend. Valium is a waste and does nice play nice with the body afterwards. Please advise at your earliest possible convenience.     [address] for Rx    Thank you    Alexis Pop

## 2020-06-26 RX ORDER — ALPRAZOLAM 1 MG/1
1 TABLET ORAL
Qty: 1 TABLET | Refills: 0 | Status: SHIPPED | OUTPATIENT
Start: 2020-06-26 | End: 2020-06-26

## 2020-09-09 ENCOUNTER — OFFICE VISIT (OUTPATIENT)
Dept: ENT CLINIC | Age: 70
End: 2020-09-09
Payer: MEDICARE

## 2020-09-09 VITALS
DIASTOLIC BLOOD PRESSURE: 82 MMHG | SYSTOLIC BLOOD PRESSURE: 161 MMHG | TEMPERATURE: 97.2 F | HEART RATE: 79 BPM | RESPIRATION RATE: 17 BRPM | BODY MASS INDEX: 34.75 KG/M2 | WEIGHT: 221.4 LBS | HEIGHT: 67 IN

## 2020-09-09 PROCEDURE — G8417 CALC BMI ABV UP PARAM F/U: HCPCS | Performed by: STUDENT IN AN ORGANIZED HEALTH CARE EDUCATION/TRAINING PROGRAM

## 2020-09-09 PROCEDURE — 69210 REMOVE IMPACTED EAR WAX UNI: CPT | Performed by: STUDENT IN AN ORGANIZED HEALTH CARE EDUCATION/TRAINING PROGRAM

## 2020-09-09 PROCEDURE — 3017F COLORECTAL CA SCREEN DOC REV: CPT | Performed by: STUDENT IN AN ORGANIZED HEALTH CARE EDUCATION/TRAINING PROGRAM

## 2020-09-09 PROCEDURE — 1036F TOBACCO NON-USER: CPT | Performed by: STUDENT IN AN ORGANIZED HEALTH CARE EDUCATION/TRAINING PROGRAM

## 2020-09-09 PROCEDURE — G8427 DOCREV CUR MEDS BY ELIG CLIN: HCPCS | Performed by: STUDENT IN AN ORGANIZED HEALTH CARE EDUCATION/TRAINING PROGRAM

## 2020-09-09 PROCEDURE — 1123F ACP DISCUSS/DSCN MKR DOCD: CPT | Performed by: STUDENT IN AN ORGANIZED HEALTH CARE EDUCATION/TRAINING PROGRAM

## 2020-09-09 PROCEDURE — 4040F PNEUMOC VAC/ADMIN/RCVD: CPT | Performed by: STUDENT IN AN ORGANIZED HEALTH CARE EDUCATION/TRAINING PROGRAM

## 2020-09-09 PROCEDURE — 99203 OFFICE O/P NEW LOW 30 MIN: CPT | Performed by: STUDENT IN AN ORGANIZED HEALTH CARE EDUCATION/TRAINING PROGRAM

## 2020-09-09 ASSESSMENT — ENCOUNTER SYMPTOMS
EYE PAIN: 0
NAUSEA: 0
COUGH: 0
RHINORRHEA: 0
SHORTNESS OF BREATH: 0
VOMITING: 0

## 2020-09-09 NOTE — PROGRESS NOTES
[facility]      Patient Name: Kenisha Avelar  Medical Record Number:  [id number]  Primary Care Physician:  Nai Arias MD  Date of Consultation: 9/9/2020    Chief Complaint:   Chief Complaint   Patient presents with    Cerumen Impaction     Patient states that he believes his left ear is infected, states that over the weekend he had deneen pain and felt like he had drainage in the back of his throat. States that the left ear is \"totally plugged\" states that about 10 yrs ago he had a mass in the ear canal and after that was taken care of and healed, his ear has never been the same. States the he can't hear out of left ear. States HX of cerumenr removal q6 mos- 1yr        HISTORY OF PRESENT ILLNESS  Mela Wan is a(n) 78 y.o. male who cerumen impaction. He wears hearing aids. [facility] year. He is happy with his hearing aids but would like to establish care with [address] [place] [address] as all of his other care is done here. He did have some left-sided ear pain over the weekend which has resolved. He does feel his hearing is slightly diminished due to the cerumen impaction.       Patient Active Problem List   Diagnosis    GERD (gastroesophageal reflux disease)    Hyperlipidemia    Erectile dysfunction    Low back pain    Hypotestosteronism    Diabetes type 2, uncontrolled (HCC)    Mild nonproliferative diabetic retinopathy ([address]    Diabetic retinopathy ([address]     Past Surgical History:   Procedure Laterality Date    KNEE GANGLION SURGERY      left wrist and right anle    VASECTOMY       Family History   Problem Relation Age of Onset    Stroke Mother     Cancer Father     Diabetes Father     Diabetes Maternal Grandfather     Diabetes Paternal Grandmother      Social History     Socioeconomic History    Marital status:      Spouse name: Not on file    Number of children: Not on file  Years of education: Not on file    Highest education level: Not on file   Occupational History    Not on file   Social Needs    Financial resource strain: Not on file    Food insecurity     Worry: Not on file     Inability: Not on file    Transportation needs     Medical: Not on file     Non-medical: Not on file   Tobacco Use    Smoking status: Never Smoker    Smokeless tobacco: Never Used   Substance and Sexual Activity    Alcohol use: No     Comment: rarely     Drug use: No    Sexual activity: Yes   Lifestyle    Physical activity     Days per week: Not on file     Minutes per session: Not on file    Stress: Not on file   Relationships    Social connections     Talks on phone: Not on file     Gets together: Not on file     Attends Congregation service: Not on file     Active member of club or organization: Not on file     Attends meetings of clubs or organizations: Not on file     Relationship status: Not on file    Intimate partner violence     Fear of current or ex partner: Not on file     Emotionally abused: Not on file     Physically abused: Not on file     Forced sexual activity: Not on file   Other Topics Concern    Not on file   Social History Narrative    Not on file       DRUG/FOOD ALLERGIES: Lisinopril; Other; Toujeo solostar [insulin glargine]; and Victoza [liraglutide]    CURRENT MEDICATIONS  Prior to Admission medications    Medication Sig Start Date End Date Taking?  Authorizing Provider   UNABLE TO FIND Hemoglobin A1C home monitor device 5/11/20  Yes Cachorro Coleman MD   sildenafil (REVATIO) 20 MG tablet Take 3 tablets by mouth as needed (erectile dysfunction) 5/2/20  Yes Cachorro Coleman MD   etodolac (LODINE) 300 MG capsule Take 1 capsule by mouth every 8 hours 5/2/20  Yes Cachorro Coleman MD   insulin 70-30 (NOVOLIN 70/30) (70-30) 100 UNIT per ML injection vial Inject 25 units in the morning and 10-15 units nightly 5/2/20  Yes Cachorro Coleman MD   acarbose (PRECOSE) 100 MG tablet TAKE 1 Breath 1/28/16  Yes Pierce Byrd MD   aspirin 81 MG chewable tablet Take 81 mg by mouth daily. OTC    Yes Historical Provider, MD   fish oil-omega-3 fatty acids 1000 MG capsule Take 2 g by mouth daily. OTC   Yes Historical Provider, MD       REVIEW OF SYSTEMS  The following systems were reviewed and revealed the following in addition to any already discussed in the HPI:    Review of Systems   Constitutional: Negative for fatigue and fever. HENT: Positive for ear pain and hearing loss. Negative for congestion, postnasal drip, rhinorrhea and sneezing. Eyes: Negative for pain and visual disturbance. Respiratory: Negative for cough and shortness of breath. Cardiovascular: Negative for chest pain. Gastrointestinal: Negative for nausea and vomiting. Endocrine: Negative. Genitourinary: Negative. Musculoskeletal: Negative for neck pain and neck stiffness. Skin: Negative for rash. Neurological: Negative for dizziness and headaches. Psychiatric/Behavioral: Negative.            PHYSICAL EXAM  BP (!) 161/82 (Site: Left Upper Arm, Position: Sitting, Cuff Size: Medium Adult)   Pulse 79   Temp 97.2 °F (36.2 °C) (Infrared)   Resp 17   Ht 5' 7.05\" (1.703 m)   Wt 221 lb 6.4 oz (100.4 kg)   BMI 34.62 kg/m²     GENERAL: No Acute Distress, Alert and Oriented, no hoarseness  EYES: EOMI, Anti-icteric  NOSE: No epistaxis, nasal mucosa within normal limits, no purulent drainage  EARS: Normal external canal appearance, EAC patent bilaterally after cerumen removal  FACE: 1/6 House-Brackmann Scale, symmetric, sensation equal bilaterally  ORAL CAVITY: No masses or lesions palpated, uvula is midline, moist mucous membranes  NECK: Normal range of motion, no thyromegaly, trachea is midline, no lymphadenopathy, no neck masses, no crepitus  CHEST: Normal respiratory effort, no retractions, breathing comfortably  SKIN: No rashes, normal appearing skin, no evidence of skin lesions/tumors    RADIOLOGY  Summary of findings:      PROCEDURE  Otomicroscopy and Cerumen Removal    An operating microscope was utilized to visualize the external auditory canals using a speculum. The external auditory canals where occluded with cerumen right/left/bilaterally. The cerumen was removed with instrumentation under microscopic evaluation. The bilateral tympanic membranes and ossicles are intact. No fluid visualized in the bilateral middle ears. ASSESSMENT/PLAN  Rachel Staton is a very pleasant 78 y.o. male with     1. Sensorineural hearing loss (SNHL) of both ears  Referral for audiogram  - [facility] Tasha HUSAIN, [facility]    2. Bilateral impacted cerumen  Cerumen was removed with microscope and instruments. His hearing has improved after cerumen removal.    He will follow-up in 3 months and get a repeat hearing test and evaluation of his removal at that time. Medical Decision Making:   The following items were considered in medical decision making:  Independent review of images  Review / order clinical lab tests  Review / order radiology tests  Decision to obtain old records

## 2020-10-27 ENCOUNTER — PATIENT MESSAGE (OUTPATIENT)
Dept: FAMILY MEDICINE CLINIC | Age: 70
End: 2020-10-27

## 2020-11-03 RX ORDER — TADALAFIL 5 MG/1
5 TABLET ORAL DAILY
Qty: 30 TABLET | Refills: 5 | Status: SHIPPED | OUTPATIENT
Start: 2020-11-03 | End: 2020-11-16 | Stop reason: SDUPTHER

## 2020-11-12 NOTE — PROGRESS NOTES
[facility]) Dermatology  Irasema LegerValentíntk      Lester Guaman  1950    69 y.o. male     Date of Visit: 11/16/2020    Last Visit: 1yr    Chief Complaint: Skin check    History of Present Illness:  1. Here for skin check. History of actinic keratoses s/p cryotherapy.   -Spends a lot of time outdoors. Always wears a cap and has been using SPF 15-30 sunscreen more regularly this year     2. Concerned about asymptomatic raised lesion on R arm and L abdomen     Review of Systems:  Constitutional: Reports general sense of well-being. Skin: No interval severe sunburns. Allergies: Reviewed and updated. Past Medical History, Surgical History, Medications and Allergies reviewed.      Past Medical History:   Diagnosis Date    Diabetic retinopathy ([address]     Dizziness     motion/ car sick    Erectile dysfunction 6/6/2011    GERD (gastroesophageal reflux disease)     Hearing loss     Hyperlipidemia     Low back pain 6/6/2011    Type II or unspecified type diabetes mellitus without mention of complication, not stated as uncontrolled      Past Surgical History:   Procedure Laterality Date    KNEE GANGLION SURGERY      left wrist and right anle    VASECTOMY         Allergies   Allergen Reactions    Lisinopril      cough    Other      INOVAR    Toujeo Solostar [Insulin Glargine]      Dizziness, weight gain    Viagra [Sildenafil Citrate] Other (See Comments)     headaches    Victoza [Liraglutide] Diarrhea, Nausea And Vomiting and Other (See Comments)     Nausea, abdominal pain       Outpatient Medications Marked as Taking for the 11/16/20 encounter (Office Visit) with Irasema Leger MD   Medication Sig Dispense Refill    acarbose (PRECOSE) 100 MG tablet TAKE 1 TABLET BY MOUTH THREE TIMES DAILY WITH MEALS 90 tablet 3    atorvastatin (LIPITOR) 40 MG tablet TAKE 1 TABLET BY MOUTH ONE TIME A DAY 90 tablet 3    fluticasone (FLONASE) 50 MCG/ACT nasal spray USE 1 SPRAY(S) IN EACH NOSTRIL ONCE DAILY 3 Bottle 3    insulin 70-30 (NOVOLIN 70/30) (70-30) 100 UNIT per ML injection vial Inject 25 units in the morning and 10-15 units nightly 2 vial 3    losartan (COZAAR) 25 MG tablet Take one tablet by mouth once daily 90 tablet 3    metFORMIN (GLUCOPHAGE-XR) 750 MG extended release tablet TAKE 2 TABLETS BY MOUTH ONCE DAILY WITH BREAKFAST 180 tablet 3    pantoprazole (PROTONIX) 20 MG tablet Take 1 tablet by mouth every morning (before breakfast) 30 tablet 5    raNITIdine (ZANTAC) 150 MG tablet TAKE ONE TABLET BY MOUTH TWICE DAILY 180 tablet 3    tadalafil (CIALIS) 5 MG tablet Take 1 tablet by mouth daily 30 tablet 5    UNABLE TO FIND Hemoglobin A1C home monitor device 1 Device 0    etodolac (LODINE) 300 MG capsule Take 1 capsule by mouth every 8 hours 90 capsule 3    blood glucose monitor strips Test 7 times a day & as needed for symptoms of irregular blood glucose. Freestyle Strips per patient. Dx: E11.65 500 strip 3    Insulin Syringe-Needle U-100 (INSULIN SYRINGE .5CC/31GX5/16\") 31G X 5/16\" 0.5 ML MISC 1 each by Does not apply route 4 times daily 200 each 3    Lancets MISC Dx 250.02, use to test blood glucose 5 times daily 500 each 3    Alcohol Swabs (ALCOHOL PADS) 70 % PADS 1 each by Does not apply route 2 times daily 100 each 5    albuterol (PROVENTIL) (2.5 MG/3ML) 0.083% nebulizer solution Take 3 mLs by nebulization every 4 hours as needed for Wheezing or Shortness of Breath 120 each 5    Multiple Vitamins-Minerals (THERAPEUTIC MULTIVITAMIN-MINERALS) tablet Take 1 tablet by mouth daily      Blood Glucose Monitoring Suppl (FREESTYLE FREEDOM LITE) W/DEVICE KIT 1 kit by Does not apply route daily 1 kit 0    albuterol sulfate (PROAIR RESPICLICK) 338 (90 BASE) MCG/ACT aerosol powder inhalation Inhale 1-2 puffs into the lungs every 4 hours as needed for Wheezing or Shortness of Breath 1 Inhaler 0    aspirin 81 MG chewable tablet Take 81 mg by mouth daily.  OTC       fish oil-omega-3 fatty acids 1000 MG capsule Take 2 g by mouth daily. 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 Physical Examination     The following were examined and determined to be normal: Psych/Neuro, Scalp/hair, Conjunctivae/eyelids, Gums/teeth/lips, Neck, Breast/axilla/chest, Back, LLE, Nails/digits and Genitalia/groin/buttocks. The following were examined and determined to be abnormal: Head/face, Abdomen, RUE, LUE and RLE. -General: Well-appearing, NAD  1. L forearm 2, R lower leg 1, L 3 and R 1 temples, L eyebrow 1, L upper cheek 1 - ill-defined irregularly-shaped roughly-scaling thin pink macules/papules   2. R upper arm, L abdomen - well-defined \"stuck-on\" verrucous tan-brown papule(s)     Assessment and Plan     1. Actinic keratosis(es)  -Edu re: relationship with chronic cumulative sun exposure, low premalignant potential.   -9 lesion(s) treated w/ liquid nitrogen x 2 cycles - L forearm 2, R lower leg 1, L 3 and R 1 temples, L eyebrow 1, L upper cheek 1. Edu re: risk of blister formation, discomfort, scar, hypopigmentation. Discussed wound care. -Reviewed sun protective behavior -- sun avoidance during the peak hours of the day, sun-protective clothing (including hat and sunglasses), sunscreen use (water resistant, broad spectrum, SPF at least 30, need for reapplication every 2 to 3 hours). -Return for full skin exam in 1 year (sooner if indicated)         2. Seborrheic keratosis(es)  -Reassurance re: benignity  -No treatment performed.

## 2020-11-16 ENCOUNTER — OFFICE VISIT (OUTPATIENT)
Dept: FAMILY MEDICINE CLINIC | Age: 70
End: 2020-11-16
Payer: MEDICARE

## 2020-11-16 ENCOUNTER — TELEPHONE (OUTPATIENT)
Dept: FAMILY MEDICINE CLINIC | Age: 70
End: 2020-11-16

## 2020-11-16 ENCOUNTER — OFFICE VISIT (OUTPATIENT)
Dept: DERMATOLOGY | Age: 70
End: 2020-11-16
Payer: MEDICARE

## 2020-11-16 VITALS — TEMPERATURE: 97.9 F

## 2020-11-16 VITALS
BODY MASS INDEX: 34.81 KG/M2 | SYSTOLIC BLOOD PRESSURE: 132 MMHG | DIASTOLIC BLOOD PRESSURE: 70 MMHG | HEART RATE: 72 BPM | OXYGEN SATURATION: 96 % | WEIGHT: 221.8 LBS | HEIGHT: 67 IN | TEMPERATURE: 97.1 F

## 2020-11-16 LAB — HBA1C MFR BLD: 8.5 %

## 2020-11-16 PROCEDURE — 99213 OFFICE O/P EST LOW 20 MIN: CPT | Performed by: DERMATOLOGY

## 2020-11-16 PROCEDURE — 1036F TOBACCO NON-USER: CPT | Performed by: DERMATOLOGY

## 2020-11-16 PROCEDURE — 1123F ACP DISCUSS/DSCN MKR DOCD: CPT | Performed by: FAMILY MEDICINE

## 2020-11-16 PROCEDURE — 3052F HG A1C>EQUAL 8.0%<EQUAL 9.0%: CPT | Performed by: FAMILY MEDICINE

## 2020-11-16 PROCEDURE — G8427 DOCREV CUR MEDS BY ELIG CLIN: HCPCS | Performed by: DERMATOLOGY

## 2020-11-16 PROCEDURE — 1123F ACP DISCUSS/DSCN MKR DOCD: CPT | Performed by: DERMATOLOGY

## 2020-11-16 PROCEDURE — 83036 HEMOGLOBIN GLYCOSYLATED A1C: CPT | Performed by: FAMILY MEDICINE

## 2020-11-16 PROCEDURE — 2022F DILAT RTA XM EVC RTNOPTHY: CPT | Performed by: FAMILY MEDICINE

## 2020-11-16 PROCEDURE — 17000 DESTRUCT PREMALG LESION: CPT | Performed by: DERMATOLOGY

## 2020-11-16 PROCEDURE — G8417 CALC BMI ABV UP PARAM F/U: HCPCS | Performed by: DERMATOLOGY

## 2020-11-16 PROCEDURE — 1036F TOBACCO NON-USER: CPT | Performed by: FAMILY MEDICINE

## 2020-11-16 PROCEDURE — G8482 FLU IMMUNIZE ORDER/ADMIN: HCPCS | Performed by: FAMILY MEDICINE

## 2020-11-16 PROCEDURE — G8417 CALC BMI ABV UP PARAM F/U: HCPCS | Performed by: FAMILY MEDICINE

## 2020-11-16 PROCEDURE — 3017F COLORECTAL CA SCREEN DOC REV: CPT | Performed by: FAMILY MEDICINE

## 2020-11-16 PROCEDURE — G8427 DOCREV CUR MEDS BY ELIG CLIN: HCPCS | Performed by: FAMILY MEDICINE

## 2020-11-16 PROCEDURE — 4040F PNEUMOC VAC/ADMIN/RCVD: CPT | Performed by: DERMATOLOGY

## 2020-11-16 PROCEDURE — 99213 OFFICE O/P EST LOW 20 MIN: CPT | Performed by: FAMILY MEDICINE

## 2020-11-16 PROCEDURE — 4040F PNEUMOC VAC/ADMIN/RCVD: CPT | Performed by: FAMILY MEDICINE

## 2020-11-16 PROCEDURE — 17003 DESTRUCT PREMALG LES 2-14: CPT | Performed by: DERMATOLOGY

## 2020-11-16 PROCEDURE — G0439 PPPS, SUBSEQ VISIT: HCPCS | Performed by: FAMILY MEDICINE

## 2020-11-16 PROCEDURE — 3017F COLORECTAL CA SCREEN DOC REV: CPT | Performed by: DERMATOLOGY

## 2020-11-16 PROCEDURE — G8482 FLU IMMUNIZE ORDER/ADMIN: HCPCS | Performed by: DERMATOLOGY

## 2020-11-16 RX ORDER — LOSARTAN POTASSIUM 25 MG/1
TABLET ORAL
Qty: 90 TABLET | Refills: 3 | Status: SHIPPED | OUTPATIENT
Start: 2020-11-16 | End: 2021-03-09 | Stop reason: SDUPTHER

## 2020-11-16 RX ORDER — METFORMIN HYDROCHLORIDE 750 MG/1
TABLET, EXTENDED RELEASE ORAL
Qty: 180 TABLET | Refills: 3 | Status: SHIPPED | OUTPATIENT
Start: 2020-11-16 | End: 2021-03-09 | Stop reason: SDUPTHER

## 2020-11-16 RX ORDER — TADALAFIL 5 MG/1
5 TABLET ORAL DAILY
Qty: 30 TABLET | Refills: 5 | Status: SHIPPED | OUTPATIENT
Start: 2020-11-16 | End: 2021-03-09 | Stop reason: SDUPTHER

## 2020-11-16 RX ORDER — RANITIDINE 150 MG/1
TABLET ORAL
Qty: 180 TABLET | Refills: 3 | Status: SHIPPED | OUTPATIENT
Start: 2020-11-16 | End: 2020-12-09

## 2020-11-16 RX ORDER — FLUTICASONE PROPIONATE 50 MCG
SPRAY, SUSPENSION (ML) NASAL
Qty: 3 BOTTLE | Refills: 3 | Status: SHIPPED | OUTPATIENT
Start: 2020-11-16 | End: 2022-01-11

## 2020-11-16 RX ORDER — ATORVASTATIN CALCIUM 40 MG/1
TABLET, FILM COATED ORAL
Qty: 90 TABLET | Refills: 3 | Status: SHIPPED | OUTPATIENT
Start: 2020-11-16 | End: 2021-03-09 | Stop reason: SDUPTHER

## 2020-11-16 RX ORDER — PANTOPRAZOLE SODIUM 20 MG/1
20 TABLET, DELAYED RELEASE ORAL
Qty: 30 TABLET | Refills: 5 | Status: SHIPPED | OUTPATIENT
Start: 2020-11-16 | End: 2021-03-09 | Stop reason: SDUPTHER

## 2020-11-16 RX ORDER — ACARBOSE 100 MG/1
TABLET ORAL
Qty: 90 TABLET | Refills: 3 | Status: SHIPPED | OUTPATIENT
Start: 2020-11-16 | End: 2021-03-09 | Stop reason: SDUPTHER

## 2020-11-16 RX ORDER — FAMOTIDINE 20 MG/1
20 TABLET, FILM COATED ORAL 2 TIMES DAILY
Qty: 60 TABLET | Refills: 5 | Status: SHIPPED | OUTPATIENT
Start: 2020-11-16 | End: 2021-03-09 | Stop reason: SDUPTHER

## 2020-11-16 ASSESSMENT — LIFESTYLE VARIABLES
AUDIT TOTAL SCORE: 1
HAS A RELATIVE, FRIEND, DOCTOR, OR ANOTHER HEALTH PROFESSIONAL EXPRESSED CONCERN ABOUT YOUR DRINKING OR SUGGESTED YOU CUT DOWN: 0
HAVE YOU OR SOMEONE ELSE BEEN INJURED AS A RESULT OF YOUR DRINKING: 0
HOW OFTEN DURING THE LAST YEAR HAVE YOU BEEN UNABLE TO REMEMBER WHAT HAPPENED THE NIGHT BEFORE BECAUSE YOU HAD BEEN DRINKING: 0
HOW MANY STANDARD DRINKS CONTAINING ALCOHOL DO YOU HAVE ON A TYPICAL DAY: 0
HOW OFTEN DURING THE LAST YEAR HAVE YOU FAILED TO DO WHAT WAS NORMALLY EXPECTED FROM YOU BECAUSE OF DRINKING: 0
AUDIT-C TOTAL SCORE: 1
HOW OFTEN DO YOU HAVE A DRINK CONTAINING ALCOHOL: 1
HOW OFTEN DURING THE LAST YEAR HAVE YOU FOUND THAT YOU WERE NOT ABLE TO STOP DRINKING ONCE YOU HAD STARTED: 0
HOW OFTEN DURING THE LAST YEAR HAVE YOU HAD A FEELING OF GUILT OR REMORSE AFTER DRINKING: 0
HOW OFTEN DO YOU HAVE SIX OR MORE DRINKS ON ONE OCCASION: 0
HOW OFTEN DURING THE LAST YEAR HAVE YOU NEEDED AN ALCOHOLIC DRINK FIRST THING IN THE MORNING TO GET YOURSELF GOING AFTER A NIGHT OF HEAVY DRINKING: 0

## 2020-11-16 NOTE — PATIENT INSTRUCTIONS
Personalized Preventive Plan for Bereket Alexander - 11/16/2020  Medicare offers a range of preventive health benefits. Some of the tests and screenings are paid in full while other may be subject to a deductible, co-insurance, and/or copay. Some of these benefits include a comprehensive review of your medical history including lifestyle, illnesses that may run in your family, and various assessments and screenings as appropriate. After reviewing your medical record and screening and assessments performed today your provider may have ordered immunizations, labs, imaging, and/or referrals for you. A list of these orders (if applicable) as well as your Preventive Care list are included within your After Visit Summary for your review. Other Preventive Recommendations:    · A preventive eye exam performed by an eye specialist is recommended every 1-2 years to screen for glaucoma; cataracts, macular degeneration, and other eye disorders. · A preventive dental visit is recommended every 6 months. · Try to get at least 150 minutes of exercise per week or 10,000 steps per day on a pedometer . · Order or download the FREE \"Exercise & Physical Activity: Your Everyday Guide\" from [employer]. Call 5-488.122.6645 or search [employer] online. · You need 0555-7529 mg of calcium and 4636-8986 IU of vitamin D per day. It is possible to meet your calcium requirement with diet alone, but a vitamin D supplement is usually necessary to meet this goal.  · When exposed to the sun, use a sunscreen that protects against both UVA and UVB radiation with an SPF of 30 or greater. Reapply every 2 to 3 hours or after sweating, drying off with a towel, or swimming. · Always wear a seat belt when traveling in a car. Always wear a helmet when riding a bicycle or motorcycle.

## 2020-11-16 NOTE — PROGRESS NOTES
Medicare Annual Wellness Visit  Name: Charles Peralta Date: 2020   MRN: [id number] Sex: Male   Age: 69 y.o. Ethnicity: Non-/Non    : 1950 Race: Cornelius Dara is here for Diabetes and Medicare AWV (grape, dime, clock)    Screenings for behavioral, psychosocial and functional/safety risks, and cognitive dysfunction are all negative except as indicated below. These results, as well as other patient data from the [address] form, are documented in Flowsheets linked to this Encounter. Diabetes Mellitus Type 2: Current symptoms/problems include none. Medication compliance:  compliant all of the time. Currently dosing 70/30 insulin at 20 units BID. Weight trend: stable  Current exercise: regular, more than 30 mins 2 times per week. Currently back to work full time. Barriers: stress, deciding on if he wants to proceed with biopsy of his prostate due to very elevated PSA. Home blood sugar records: fasting range: 60's-150's, postprandial range: generally 180's  Any episodes of hypoglycemia? yes - usually in the morning, has occurred a few times  Eye exam current (within one year): yes - sees Dr. Malissa Blackwood every 3 months   reports that he has never smoked. He has never used smokeless tobacco.   Daily Aspirin?  Yes  Known diabetic complications: retinopathy    Lab Results   Component Value Date    LABA1C 8.5 2020    LABA1C 9.8 2020    LABA1C 8.8 2020     Lab Results   Component Value Date    CREATININE 0.8 2020     Lab Results   Component Value Date    ALT 32 2020    AST 25 2020     No components found for: CHLPL  Lab Results   Component Value Date    TRIG 93 2020     Lab Results   Component Value Date    HDL 52 2020     Lab Results   Component Value Date    LDLCALC 79 2020             Allergies   Allergen Reactions    Lisinopril      cough    Other      INOVAR    Toujeo Solostar [Insulin Glargine] Dizziness, weight gain    Viagra [Sildenafil Citrate] Other (See Comments)     headaches    Victoza [Liraglutide] Diarrhea, Nausea And Vomiting and Other (See Comments)     Nausea, abdominal pain         Prior to Visit Medications    Medication Sig Taking? Authorizing Provider   tadalafil (CIALIS) 5 MG tablet Take 1 tablet by mouth daily Yes Rafi Prescott MD   UNABLE TO FIND Hemoglobin A1C home monitor device Yes Rafi Prescott MD   etodolac (LODINE) 300 MG capsule Take 1 capsule by mouth every 8 hours Yes Rafi Prescott MD   insulin 70-30 (NOVOLIN 70/30) (70-30) 100 UNIT per ML injection vial Inject 25 units in the morning and 10-15 units nightly Yes Rafi Prescott MD   acarbose (PRECOSE) 100 MG tablet TAKE 1 TABLET BY MOUTH THREE TIMES DAILY WITH MEALS Yes Rafi Prescott MD   atorvastatin (LIPITOR) 40 MG tablet TAKE 1 TABLET BY MOUTH ONE TIME A DAY Yes Rafi Prescott MD   blood glucose monitor strips Test 7 times a day & as needed for symptoms of irregular blood glucose. Freestyle Strips per patient.  Dx: E11.65 Yes Rafi Prescott MD   fluticasone (FLONASE) 50 MCG/ACT nasal spray USE 1 SPRAY(S) IN EACH NOSTRIL ONCE DAILY Yes Rafi Prescott MD   Insulin Syringe-Needle U-100 (INSULIN SYRINGE .5CC/31GX5/16\") 31G X 5/16\" 0.5 ML MISC 1 each by Does not apply route 4 times daily Yes Rafi Prescott MD   Lancets MISC Dx 250.02, use to test blood glucose 5 times daily Yes Rafi Prescott MD   losartan (COZAAR) 25 MG tablet Take one tablet by mouth once daily Yes Rafi Prescott MD   ranitidine (ZANTAC) 150 MG tablet TAKE ONE TABLET BY MOUTH TWICE DAILY Yes Rafi Prescott MD   metFORMIN (GLUCOPHAGE-XR) 750 MG extended release tablet TAKE 2 TABLETS BY MOUTH ONCE DAILY WITH BREAKFAST Yes Rafi Prescott MD   pantoprazole (PROTONIX) 20 MG tablet Take 1 tablet by mouth every morning (before breakfast) Yes Rafi Prescott MD   Alcohol Swabs (ALCOHOL PADS) 70 % PADS 1 each by Does not apply route 2 times daily Yes Lizzy RIBEIRO Bettye Mccann MD   albuterol (PROVENTIL) (2.5 MG/3ML) 0.083% nebulizer solution Take 3 mLs by nebulization every 4 hours as needed for Wheezing or Shortness of Breath Yes Arturo Mei MD   Multiple Vitamins-Minerals (THERAPEUTIC MULTIVITAMIN-MINERALS) tablet Take 1 tablet by mouth daily Yes Historical Provider, MD   Blood Glucose Monitoring Suppl (FREESTYLE FREEDOM LITE) W/DEVICE KIT 1 kit by Does not apply route daily Yes Arturo Mei MD   albuterol sulfate (PROAIR RESPICLICK) 982 (90 BASE) MCG/ACT aerosol powder inhalation Inhale 1-2 puffs into the lungs every 4 hours as needed for Wheezing or Shortness of Breath Yes Arturo Mei MD   aspirin 81 MG chewable tablet Take 81 mg by mouth daily. OTC  Yes Historical Provider, MD   fish oil-omega-3 fatty acids 1000 MG capsule Take 2 g by mouth daily.  OTC Yes Historical Provider, MD       Past Medical History:   Diagnosis Date    Diabetic retinopathy ([address]     Dizziness     motion/ car sick    Erectile dysfunction 6/6/2011    GERD (gastroesophageal reflux disease)     Hearing loss     Hyperlipidemia     Low back pain 6/6/2011    Type II or unspecified type diabetes mellitus without mention of complication, not stated as uncontrolled        Past Surgical History:   Procedure Laterality Date    KNEE GANGLION SURGERY      left wrist and right anle    VASECTOMY         Family History   Problem Relation Age of Onset    Stroke Mother     Cancer Father     Diabetes Father     Diabetes Maternal Grandfather     Diabetes Paternal Grandmother        CareTeam (Including outside providers/suppliers regularly involved in providing care):   Patient Care Team:  Arturo Mei MD as PCP - Bianca Griffin MD as PCP - [facility] Empaneled Provider    Wt Readings from Last 3 Encounters:   11/16/20 221 lb 12.8 oz (100.6 kg)   09/09/20 221 lb 6.4 oz (100.4 kg)   05/01/20 222 lb (100.7 kg)     Vitals:    11/16/20 0954   BP: 132/70   Pulse: 72   Temp: 97.1 °F (36.2 °C)   SpO2: 96%   Weight: 221 lb 12.8 oz (100.6 kg)   Height: 5' 7\" (1.702 m)     Body mass index is 34.74 kg/m². Based upon direct observation of the patient, evaluation of cognition reveals recent and remote memory intact. Physical Exam  Vitals signs and nursing note reviewed. Constitutional:       General: He is not in acute distress. Appearance: He is well-developed. He is not diaphoretic. HENT:      Head: Normocephalic and atraumatic. Eyes:      General: No scleral icterus. Conjunctiva/sclera: Conjunctivae normal.   Cardiovascular:      Rate and Rhythm: Normal rate and regular rhythm. Pulses:           Dorsalis pedis pulses are 2+ on the right side and 2+ on the left side. Posterior tibial pulses are 2+ on the right side and 2+ on the left side. Heart sounds: Normal heart sounds. No murmur. No friction rub. No gallop. Pulmonary:      Effort: Pulmonary effort is normal. No respiratory distress. Breath sounds: Normal breath sounds. No wheezing or rales. Abdominal:      General: Bowel sounds are normal. There is no distension. Palpations: Abdomen is soft. There is no mass. Tenderness: There is no abdominal tenderness. There is no guarding or rebound. Musculoskeletal:      Right foot: Normal range of motion. No deformity. Left foot: Normal range of motion. No deformity. Feet:      Right foot:      Protective Sensation: 10 sites tested. 10 sites sensed. Skin integrity: Skin integrity normal. No ulcer, blister, skin breakdown, erythema, warmth, callus or dry skin. Left foot:      Protective Sensation: 10 sites tested. 10 sites sensed. Skin integrity: Skin integrity normal. No ulcer, blister, skin breakdown, erythema, warmth, callus or dry skin. Neurological:      Mental Status: He is alert. Sensory: No sensory deficit. Deep Tendon Reflexes:      Reflex Scores:       Achilles reflexes are 2+ on the right side and 2+ on the left side. Comments: Normal proprioception of big toes bilaterally         Results for POC orders placed in visit on 11/16/20   POCT glycosylated hemoglobin (Hb A1C)   Result Value Ref Range    Hemoglobin A1C 8.5 %        Patient's complete Health Risk Assessment and screening values have been reviewed and are found in Flowsheets. The following problems were reviewed today and where indicated follow up appointments were made and/or referrals ordered. Positive Risk Factor Screenings with Interventions:     General Health and ACP:  General  In general, how would you say your health is?: Good  In the past 7 days, have you experienced any of the following?  New or Increased Pain, New or Increased Fatigue, Loneliness, Social Isolation, Stress or Anger?: None of These  Do you get the social and emotional support that you need?: Yes  Do you have a Living Will?: Yes  Advance Directives     Power of  Living Will ACP-Advance Directive ACP-Power of     Not on File Not on File [address] Interventions:  · No Living Will: ACP documents already completed- patient asked to provide copy to the office    Health Habits/Nutrition:  Health Habits/Nutrition  Do you exercise for at least 20 minutes 2-3 times per week?: Yes  Have you lost any weight without trying in the past 3 months?: No  Do you eat fewer than 2 meals per day?: No  Have you seen a dentist within the past year?: Yes  Body mass index: (!) 34.73  Health Habits/Nutrition Interventions:  · Nutritional issues:  continue to try to limit simple sugars in his diet, continue regular exercise    Personalized Preventive Plan   Current Health Maintenance Status  Immunization History   Administered Date(s) Administered    Influenza 10/29/2012, 10/11/2013    Influenza Vaccine, unspecified formulation 10/11/2013    Influenza Virus Vaccine 11/01/2014, 10/08/2015    Influenza Whole 11/01/2014, 10/08/2015    Influenza, High Dose (Fluzone 65 yrs and older) 09/07/2016, 10/17/2020    Influenza, Quadv, IM, PF (6 mo and older Fluzone, Flulaval, Fluarix, and 3 yrs and older Afluria) 10/17/2020    Influenza, Quadv, adjuvanted, 65 yrs +, IM, PF (Fluad) 10/17/2020    Influenza, Triv, inactivated, subunit, adjuvanted, IM (Fluad 65 yrs and older) 10/21/2019    Pneumococcal Conjugate 13-valent ([id number]) 10/08/2015    Pneumococcal Polysaccharide ([id number]) 10/20/2016    Tdap (Boostrix, Adacel) 12/14/2016        Health Maintenance   Topic Date Due    Shingles Vaccine (1 of 2) 11/01/1999    Annual Wellness Visit (AWV)  05/29/2019    Diabetic retinal exam  04/05/2020    Diabetic foot exam  04/15/2020    A1C test (Diabetic or Prediabetic)  09/04/2020    Lipid screen  01/24/2021    Potassium monitoring  01/24/2021    Creatinine monitoring  01/24/2021    Colon Cancer Screen FIT/FOBT  02/13/2021    PSA counseling  06/02/2021    Diabetic microalbuminuria test  06/04/2021    DTaP/Tdap/Td vaccine (2 - Td) 12/14/2026    Flu vaccine  Completed    Pneumococcal 65+ years Vaccine  Completed    Hepatitis C screen  Completed    Hepatitis A vaccine  Aged Out    Hib vaccine  Aged Out    Meningococcal (ACWY) vaccine  Aged Out     Recommendations for Heartbeat Due: see orders and patient instructions/AVS.  . Recommended screening schedule for the next 5-10 years is provided to the patient in written form: see Patient Instructions/AVS.    Germania Wild was seen today for diabetes and medicare awv. Diagnoses and all orders for this visit:    Routine general medical examination at a health care facility    Uncontrolled type 2 diabetes mellitus with retinopathy, with long-term current use of insulin ([facility] [address]  Counseling at today's visit: discussed the advantages of a diet low in carbohydrates, reminded to check sugars regularly and to bring readings in at the time of the next visit and encouraged regular exercise.   Adjusted dose of insulin: 18 units in PM, 22 units in AM. Will continue to make small adjustments as needed to tighten glucose control while trying to avoid hypoglycemia. Very encouraging hemoglobin A1C has improved so much, however he still remains uncontrolled. Next goal for him will be to get to <8% by next visit in 3 months.   -     POCT glycosylated hemoglobin (Hb A1C)  -      DIABETES FOOT EXAM  -     insulin 70-30 (NOVOLIN 70/30) (70-30) 100 UNIT per ML injection vial; Inject 22 units in the morning and 18 units nightly  -     acarbose (PRECOSE) 100 MG tablet; TAKE 1 TABLET BY MOUTH THREE TIMES DAILY WITH MEALS  -     atorvastatin (LIPITOR) 40 MG tablet; TAKE 1 TABLET BY MOUTH ONE TIME A DAY  -     losartan (COZAAR) 25 MG tablet; Take one tablet by mouth once daily  -     metFORMIN (GLUCOPHAGE-XR) 750 MG extended release tablet; TAKE 2 TABLETS BY MOUTH ONCE DAILY WITH BREAKFAST    Chronic cough  -     pantoprazole (PROTONIX) 20 MG tablet; Take 1 tablet by mouth every morning (before breakfast)  -     fluticasone (FLONASE) 50 MCG/ACT nasal spray; USE 1 SPRAY(S) IN EACH NOSTRIL ONCE DAILY    Other orders  -     tadalafil (CIALIS) 5 MG tablet;  Take 1 tablet by mouth daily

## 2020-11-25 DIAGNOSIS — R97.20 ELEVATED PSA: ICD-10-CM

## 2020-12-02 LAB
PROSTATE SPECIFIC ANTIGEN FREE: 1.9 UG/L
PROSTATE SPECIFIC ANTIGEN PERCENT FREE: 12.7 %
PROSTATE SPECIFIC ANTIGEN: 15 UG/L (ref 0–4)

## 2020-12-06 NOTE — PROGRESS NOTES
Marcos Sparks   1950, 69 y.o. male   [id number]       Referring Provider: Adele Ba DO  Referral Type: In an order in [address] [place] [address]    Reason for Visit: Evaluation of suspected change in hearing. ADULT AUDIOLOGIC EVALUATION      Marcos Sparks is a 69 y.o. male seen today, 12/9/2020 , for an initial audiologic evaluation. Patient was seen by Adele Ba DO following today's evaluation. AUDIOLOGIC AND OTHER PERTINENT MEDICAL HISTORY:      Marcos Sparks noted a perceived gradual decline in hearing, bilaterally. Patient reports most recent evaluation on 8/28/2019 at [facility]; however, audiogram not viewable in medical record. He notes known history of hearing loss as hearing aids were recommended ~1 year ago. No additional significant otologic or medical history was reported. Marcos Sparks denied otalgia, aural fullness, otorrhea, tinnitus, dizziness, imbalance, history of falls, history of head trauma, history of ear surgery and family history of hearing loss. Date: 12/9/2020     IMPRESSIONS:      Today's results revealed a symmetric sensorineural hearing loss with excellent word recognition, bilaterally. Discussed benefits of amplification. Follow medical recommendations of Corye Szymanski DO.    ASSESSMENT AND FINDINGS:     Otoscopy revealed: Clear ear canals bilaterally    RIGHT EAR:  Hearing Sensitivity: Within normal limits through 1.5kHz precipitously sloping to a moderately-severe to severe sensorineural hearing loss from 3-8kHz. Speech Recognition Threshold: 10 dB HL  Word Recognition: Excellent (%), based on NU-6 25-word list at 70 dBHL masked using recorded speech stimuli. Tympanometry: Normal peak pressure and compliance, Type A tympanogram, consistent with normal middle ear function. LEFT EAR:  Hearing Sensitivity: Within normal limits through 1.5kHz precipitously sloping to a moderately-severe to severe sensorineural hearing loss from 3-8kHz.   Speech Recognition Threshold: 15 dB HL  Word Recognition: Excellent (92%), based on NU-6 25-word list at 70 dBHL masked using recorded speech stimuli. Tympanometry: Normal peak pressure and compliance, Type A tympanogram, consistent with normal middle ear function. Reliability: Good  Transducer: Inserts    See scanned audiogram dated 12/9/2020  for results. PATIENT EDUCATION:       The following items were discussed with the patient:    - Good Communication Strategies   - Hearing Loss and Hearing Aids    - Noise-Induced Hearing Loss and use of Hearing Protection Devices (HPDs)    Educational information was shared in the After Visit Summary. RECOMMENDATIONS:                                                                                                                                                                                                                                                            The following items are recommended based on patient report and results from today's appointment:   - Continue medical follow-up with Margy Bonds DO.   - Retest hearing as medically indicated and/or sooner if a change in hearing is noted. - If desired, schedule a Hearing Aid Evaluation (HAE) appointment to discuss hearing aid options. Upon discussion, patient inquired about amplification purchased through mail. Explained to patient unable to program devices in office as they are locked by the manufacture for programming. Recommended patient contact manufacture for programming based on today's evaluation. Patient reported understanding.    - Utilize \"Good Communication Strategies\" as discussed to assist in speech understanding with communication partners. - Use hearing protection devices (HPDs), such as protective ear muffs and ear plugs, when exposed to dangerous sound levels.        Jodi Anthony  Audiologist    Chart CC'd to: Margy Bonds DO      Degree of   Hearing Sensitivity dB Range   Within Normal Limits (WNL) 0 - 20   Mild 20 - 40   Moderate 40 - 55   Moderately-Severe 55 - 70   Severe 70 - 90   Profound 90 +

## 2020-12-09 ENCOUNTER — OFFICE VISIT (OUTPATIENT)
Dept: ENT CLINIC | Age: 70
End: 2020-12-09
Payer: MEDICARE

## 2020-12-09 ENCOUNTER — PROCEDURE VISIT (OUTPATIENT)
Dept: AUDIOLOGY | Age: 70
End: 2020-12-09
Payer: MEDICARE

## 2020-12-09 VITALS
BODY MASS INDEX: 33.04 KG/M2 | WEIGHT: 218 LBS | TEMPERATURE: 97.2 F | DIASTOLIC BLOOD PRESSURE: 74 MMHG | SYSTOLIC BLOOD PRESSURE: 167 MMHG | HEIGHT: 68 IN | HEART RATE: 68 BPM

## 2020-12-09 PROBLEM — H90.3 SENSORINEURAL HEARING LOSS, BILATERAL: Status: ACTIVE | Noted: 2020-12-09

## 2020-12-09 PROCEDURE — 99213 OFFICE O/P EST LOW 20 MIN: CPT | Performed by: STUDENT IN AN ORGANIZED HEALTH CARE EDUCATION/TRAINING PROGRAM

## 2020-12-09 PROCEDURE — 1036F TOBACCO NON-USER: CPT | Performed by: STUDENT IN AN ORGANIZED HEALTH CARE EDUCATION/TRAINING PROGRAM

## 2020-12-09 PROCEDURE — G8427 DOCREV CUR MEDS BY ELIG CLIN: HCPCS | Performed by: STUDENT IN AN ORGANIZED HEALTH CARE EDUCATION/TRAINING PROGRAM

## 2020-12-09 PROCEDURE — G8417 CALC BMI ABV UP PARAM F/U: HCPCS | Performed by: STUDENT IN AN ORGANIZED HEALTH CARE EDUCATION/TRAINING PROGRAM

## 2020-12-09 PROCEDURE — 4040F PNEUMOC VAC/ADMIN/RCVD: CPT | Performed by: STUDENT IN AN ORGANIZED HEALTH CARE EDUCATION/TRAINING PROGRAM

## 2020-12-09 PROCEDURE — 69210 REMOVE IMPACTED EAR WAX UNI: CPT | Performed by: STUDENT IN AN ORGANIZED HEALTH CARE EDUCATION/TRAINING PROGRAM

## 2020-12-09 PROCEDURE — G8482 FLU IMMUNIZE ORDER/ADMIN: HCPCS | Performed by: STUDENT IN AN ORGANIZED HEALTH CARE EDUCATION/TRAINING PROGRAM

## 2020-12-09 PROCEDURE — 92557 COMPREHENSIVE HEARING TEST: CPT | Performed by: AUDIOLOGIST

## 2020-12-09 PROCEDURE — 92567 TYMPANOMETRY: CPT | Performed by: AUDIOLOGIST

## 2020-12-09 PROCEDURE — 3017F COLORECTAL CA SCREEN DOC REV: CPT | Performed by: STUDENT IN AN ORGANIZED HEALTH CARE EDUCATION/TRAINING PROGRAM

## 2020-12-09 PROCEDURE — 1123F ACP DISCUSS/DSCN MKR DOCD: CPT | Performed by: STUDENT IN AN ORGANIZED HEALTH CARE EDUCATION/TRAINING PROGRAM

## 2020-12-09 ASSESSMENT — ENCOUNTER SYMPTOMS
VOMITING: 0
EYE PAIN: 0
NAUSEA: 0
RHINORRHEA: 0
SHORTNESS OF BREATH: 0
COUGH: 0

## 2020-12-09 NOTE — PATIENT INSTRUCTIONS
Good Communication Strategies    Communication can be challenging for anyone, but can be especially difficult for those with some degree of hearing loss. While we may not be able to control every factor that may lead to difficulty with communication, there are Good Communication Strategies that we can all use in our day-to-day lives. Communication takes both parties working together for it to be successful. Tips as a Listener:   1. Control your environment. It is important to limit the amount of background noise in the room when possible. You should also consider having a good light source in the room to best see the other person. 2. Ask for clarification. Instead of saying \"What?\", you can use parts of what you heard to make a new question. For example, if you heard the word \"Thursday\" but not the rest of the week, you may ask \"What was that about Thursday? \" or \"What did you want to do Thursday? \". This shows the person talking that you are listening and will help them better explain what they are saying. 3. Be an advocate for yourself. If you are hearing but not understanding, tell the other person \"I can hear you, but I need you to slow down when you speak. \"  Or if someone is facing the other direction, say \"I cannot hear you when you are not looking at me when we talk. \"       Tips as a Talker:   - Sit or stand 3 to 6 feet away to maximize audibility         -- It is unrealistic to believe someone else will fully hear your message if you are speaking from across the room or in a different room in the house   - Stay at eye level to help with visual cues   - Make sure you have the persons attention before speaking   - Use facial expressions and gestures to accentuate your message   - Raise your voice slightly (do not scream)   - Speak slowly and distinctly   - Use short, simple sentences   - Rephrase your words if the person is having a hard time understanding you    - To avoid distortion, dont speak directly into a persons ear      Some additional items that may be helpful:   - Remain patient - this is important for both parties   - Write down items that still cannot be heard/understood. You may write with pen/paper or consider typing/texting on a cell phone or smart device. - If background noise is unavoidable, try to keep yourself in a good position in the room. By sitting at a lopez on the side of the restaurant (preferably a corner), it will be easier to communicate than if you were sitting at a table in the middle with background noise surrounding you. Keep yourself positioned away from music speakers or heavy foot traffic. Hearing Loss: Care Instructions  Your Care Instructions      Hearing loss is a sudden or slow decrease in how well you hear. It can range from mild to profound. Permanent hearing loss can occur with aging, and it can happen when you are exposed long-term to loud noise. Examples include listening to loud music, riding motorcycles, or being around other loud machines. Hearing loss can affect your work and home life. It can make you feel lonely or depressed. You may feel that you have lost your independence. But hearing aids and other devices can help you hear better and feel connected to others. Follow-up care is a key part of your treatment and safety. Be sure to make and go to all appointments, and call your doctor if you are having problems. It's also a good idea to know your test results and keep a list of the medicines you take. How can you care for yourself at home? · Avoid loud noises whenever possible. This helps keep your hearing from getting worse. Always wear hearing protection around loud noises. · If appropriate, wear hearing aid(s) as directed. It is recommended that hearing aids are worn during all waking hours to keep your brain active and give it access to the sounds it is missing.       · If you are beginning your process with hearing aid(s), schedule a \"Hearing Aid Evaluation\" with an audiologist to discuss your lifestyle, features of hearing aid technology, and styles of hearing aids available. It is recommended that you contact your insurance company to determine if you have a hearing aid benefit, as this may dictate who you can see for these services. · Have hearing tests as your doctor suggests. They can show whether your hearing has changed. Your hearing aid may need to be adjusted. · Use other assistive devices as needed. These may include:  ? Telephone amplifiers and hearing aids that can connect to a television, stereo, radio, or microphone. ? Devices that use lights or vibrations. These alert you to the doorbell, a ringing telephone, or a baby monitor. ? Television closed-captioning. This shows the words at the bottom of the screen. Most new TVs can do this. ? TTY (text telephone). This lets you type messages back and forth on the telephone instead of talking or listening. These devices are also called TDD. When messages are typed on the keyboard, they are sent over the phone line to a receiving TTY. The message is shown on a monitor. · Use pagers, fax machines, text, and email if it is hard for you to communicate by telephone. · Try to learn a listening technique called speech-reading. It is not lip-reading. You pay attention to people's gestures, expressions, posture, and tone of voice. These clues can help you understand what a person is saying. Face the person you are talking to, and have him or her face you. Make sure the lighting is good. You need to see the other person's face clearly. · Think about counseling if you need help to adjust to your hearing loss. When should you call for help? Watch closely for changes in your health, and be sure to contact your doctor if:    · You think your hearing is getting worse. · You have new symptoms, such as dizziness or nausea.            Noise-Induced Hearing Loss  What it is, and what you can do to prevent it      Exposure to loud sounds, in an occupational setting or recreational, can cause permanent hearing loss. Sound is measured in decibels (dB). Noise-induced hearing loss is the ONLY type of preventable hearing loss. Hearing loss related to noise exposure can occur at any age. There are small sensory cells, called inner and outer hair cells, within the inner ear (cochlea). These cells process the loudness (intensity) and pitch (frequency) of sound and send the signal to the brain via our auditory nerve (vestibulocochlear nerve, cranial nerve VIII). When these cells are damaged, they can result in permanent hearing loss and/or tinnitus. The hair cells responsible for high frequency sounds, like birds chirping, are most likely to be damaged due to loud sounds. The high frequency sounds are also very important for our clarity and understanding of speech. OCCUPATIONAL NOISE EXPOSURE RECREATIONAL NOISE EXPOSURE   Some jobs may have exposure to loud sounds in the workplace. These jobs may include but are not limited to:  [employer]   Construction   Welding   Landscaping   Hairdressing/hairstyling   Musicians  [employer]    ... And more! Many activities outside of work may cause permanent hearing loss. These activities may include but are not limited to:  Lawnmowers, leaf blowers  [employer] (such as pigs squealing)   Chainsaws and other power tools  [employer] musical instruments and/or singing   Listening to music too loudly - at concerts, through stereo, through ear buds or headphones   Attending sporting events   Attending fireworks shows or using fireworks at home  [employer] of firearms   . .. And more! REDUCE OR PROTECT YOUR EARS FROM NOISE EXPOSURE    To do your best to avoid noise-induced hearing loss, here are some tips:   Limit exposure to loud sounds.   85 dB (decibels) is safe for 8 hours. As sounds are louder, the length of time the sound is safe lessens. These numbers are cumulative across a 24-hour period. (NIOSH and CDC, 2002)  o 85 dB is safe for 8 hours  o 88 dB is safe for 4 hours  o 91 dB is safe for 2 hours  o 94 dB is safe for 1 hour  o 97 dB is safe for 30 minutes  o 100 dB is safe for 15 minutes  o 103 dB is safe for 7.5 minutes  o 106 dB is safe for 3.75 minutes  o 109 dB is safe for LESS THAN 2 minutes  o 112 dB is safe for LESS THAN 1 minute  o 115 dB is safe for ~ 30 seconds  o 130 dB can cause IMMEDIATE hearing loss   If you are unsure if a sound is too loud, consider checking the sound level with a \"sound level meter\". There are apps on smart devices that can measure the loudness of the sound. They are not as accurate as expensive equipment used by scientists, but it will give you a guesstimate of how loud the sound is, and if it may be damaging to your hearing.  If you cannot avoid loud sounds, here are ways to reduce your exposure:  o 1. Wear hearing protection  - Ear plugs and protective ear muffs can be used to reduce the intensity of the sound. The higher the NRR (noise reduction rating), the better reduction of the intensity of the sound   o 2. Turn the volume down  - When listening to music, turn the volume down, especially when wearing ear buds or headphones. A good rule of thumb is to not go beyond the middle setting on your device. If you can't hear someone talking to you from arm's length away, your music may be at a level that it can cause damage. If someone else can hear your music from 3 feet away, it may also be at a level that it can cause damage. o 3. Walk away from the sound  - If you do not have the ability to wear hearing protection or turn down the volume of the sound, you should do your best to move away from the source of the sound. - Sound decreases in intensity as we move further from the source.  The sound will decrease by 6 dB for every doubling of distance from the sound source. Exposure to these sounds may cause permanent damage to your hearing.   If you suspect your hearing has changed, it is recommended that you have your hearing tested by your audiologist.

## 2020-12-09 NOTE — PROGRESS NOTES
[facility]      Patient Name: Lester Guaman  Medical Record Number:  [id number]  Primary Care Physician:  Sandy Julio MD  Date of Consultation: 12/9/2020    Chief Complaint:   Chief Complaint   Patient presents with    Cerumen Impaction     3 month follow up        [address] [place] is a(n) 69 y.o. male who presents with cerumen impaction and hearing loss. He is here today for cerumen removal and audiogram.  He denies any changes. His hearing has been stable from previous evaluation. He did bring with him hearing aids which he purchased through the mail.       Patient Active Problem List   Diagnosis    GERD (gastroesophageal reflux disease)    Hyperlipidemia    Erectile dysfunction    Low back pain    Hypotestosteronism    Diabetes type 2, uncontrolled (HCC)    Mild nonproliferative diabetic retinopathy ([address]    Diabetic retinopathy ([address]    Sensorineural hearing loss, bilateral     Past Surgical History:   Procedure Laterality Date    KNEE GANGLION SURGERY      left wrist and right anle    VASECTOMY       Family History   Problem Relation Age of Onset    Stroke Mother     Cancer Father     Diabetes Father     Diabetes Maternal Grandfather     Diabetes Paternal Grandmother      Social History     Socioeconomic History    Marital status:      Spouse name: Not on file    Number of children: Not on file    Years of education: Not on file    Highest education level: Not on file   Occupational History    Not on file   Social Needs    Financial resource strain: Not on file    Food insecurity     Worry: Not on file     Inability: Not on file    Transportation needs     Medical: Not on file     Non-medical: Not on file   Tobacco Use    Smoking status: Never Smoker    Smokeless tobacco: Never Used   Substance and Sexual Activity    Alcohol use: No     Comment: rarely     Drug use: No    Sexual activity: Yes   Lifestyle    Physical activity     Days per week: Not on file     Minutes per session: Not on file    Stress: Not on file   Relationships    Social connections     Talks on phone: Not on file     Gets together: Not on file     Attends [employer] service: Not on file     Active member of club or organization: Not on file     Attends meetings of clubs or organizations: Not on file     Relationship status: Not on file    Intimate partner violence     Fear of current or ex partner: Not on file     Emotionally abused: Not on file     Physically abused: Not on file     Forced sexual activity: Not on file   Other Topics Concern    Not on file   Social History Narrative    Not on file       DRUG/FOOD ALLERGIES: Lisinopril; Other; Toujeo solostar [insulin glargine]; Viagra [sildenafil citrate]; and Victoza [liraglutide]    CURRENT MEDICATIONS  Prior to Admission medications    Medication Sig Start Date End Date Taking?  Authorizing Provider   acarbose (PRECOSE) 100 MG tablet TAKE 1 TABLET BY MOUTH THREE TIMES DAILY WITH MEALS 11/16/20  Yes Arturo Mei MD   atorvastatin (LIPITOR) 40 MG tablet TAKE 1 TABLET BY MOUTH ONE TIME A DAY 11/16/20  Yes Arturo Mei MD   fluticasone (FLONASE) 50 MCG/ACT nasal spray USE 1 SPRAY(S) IN EACH NOSTRIL ONCE DAILY 11/16/20  Yes Arturo Mei MD   insulin 70-30 (NOVOLIN 70/30) (70-30) 100 UNIT per ML injection vial Inject 25 units in the morning and 10-15 units nightly 11/16/20  Yes Arturo Mei MD   losartan (COZAAR) 25 MG tablet Take one tablet by mouth once daily 11/16/20  Yes Arturo Mei MD   metFORMIN (GLUCOPHAGE-XR) 750 MG extended release tablet TAKE 2 TABLETS BY MOUTH ONCE DAILY WITH BREAKFAST 11/16/20  Yes Arturo Mei MD   pantoprazole (PROTONIX) 20 MG tablet Take 1 tablet by mouth every morning (before breakfast) 11/16/20  Yes Arturo Mei MD   tadalafil (CIALIS) 5 MG tablet Take 1 tablet by mouth daily 11/16/20  Yes Arturo Mei MD   famotidine (PEPCID) 20 MG tablet Take 1 tablet by mouth 2 times daily 11/16/20  Yes Thu Johnson MD   UNABLE TO FIND Hemoglobin A1C home monitor device 5/11/20  Yes Thu Johnson MD   etodolac (LODINE) 300 MG capsule Take 1 capsule by mouth every 8 hours 5/2/20  Yes Thu Johnson MD   blood glucose monitor strips Test 7 times a day & as needed for symptoms of irregular blood glucose. Freestyle Strips per patient. Dx: E11.65 1/24/20  Yes Thu Johnson MD   Insulin Syringe-Needle U-100 (INSULIN SYRINGE .5CC/31GX5/16\") 31G X 5/16\" 0.5 ML MISC 1 each by Does not apply route 4 times daily 1/24/20  Yes Thu Johnson MD   Lancets MISC Dx 250.02, use to test blood glucose 5 times daily 1/24/20  Yes Thu Johnson MD   Alcohol Swabs (ALCOHOL PADS) 70 % PADS 1 each by Does not apply route 2 times daily 4/15/19  Yes Thu Johnson MD   albuterol (PROVENTIL) (2.5 MG/3ML) 0.083% nebulizer solution Take 3 mLs by nebulization every 4 hours as needed for Wheezing or Shortness of Breath 3/18/19  Yes Thu Johnson MD   Multiple Vitamins-Minerals (THERAPEUTIC MULTIVITAMIN-MINERALS) tablet Take 1 tablet by mouth daily   Yes Historical Provider, MD   Blood Glucose Monitoring Suppl (FREESTYLE FREEDOM LITE) W/DEVICE KIT 1 kit by Does not apply route daily 6/7/17  Yes Thu Johnson MD   albuterol sulfate (PROAIR RESPICLICK) 916 (90 BASE) MCG/ACT aerosol powder inhalation Inhale 1-2 puffs into the lungs every 4 hours as needed for Wheezing or Shortness of Breath 1/28/16  Yes Thu Johnson MD   aspirin 81 MG chewable tablet Take 81 mg by mouth daily. OTC    Yes Historical Provider, MD   fish oil-omega-3 fatty acids 1000 MG capsule Take 2 g by mouth daily. OTC   Yes Historical Provider, MD       REVIEW OF SYSTEMS  The following systems were reviewed and revealed the following in addition to any already discussed in the HPI:    Review of Systems   Constitutional: Negative for fatigue and fever. HENT: Positive for hearing loss.  Negative for congestion, ear pain, postnasal drip, rhinorrhea and sneezing. Eyes: Negative for pain and visual disturbance. Respiratory: Negative for cough and shortness of breath. Cardiovascular: Negative for chest pain. Gastrointestinal: Negative for nausea and vomiting. Endocrine: Negative. Genitourinary: Negative. Musculoskeletal: Negative for neck pain and neck stiffness. Skin: Negative for rash. Neurological: Negative for dizziness and headaches. PHYSICAL EXAM  BP (!) 167/74 (Site: Right Upper Arm, Position: Sitting)   Pulse 68   Temp 97.2 °F (36.2 °C)   Ht 5' 8\" (1.727 m)   Wt 218 lb (98.9 kg)   BMI 33.15 kg/m²     GENERAL: No Acute Distress, Alert and Oriented, no hoarseness  EYES: EOMI, Anti-icteric  NOSE: No epistaxis, nasal mucosa within normal limits, no purulent drainage  EARS: Normal external canal appearance, EAC patent with cerumen bilaterally  FACE: 1/6 House-Brackmann Scale, symmetric, sensation equal bilaterally  ORAL CAVITY: No masses or lesions palpated, uvula is midline, moist mucous membranes,   NECK: Normal range of motion, no thyromegaly, trachea is midline, no lymphadenopathy, no neck masses, no crepitus  CHEST: Normal respiratory effort, no retractions, breathing comfortably  SKIN: No rashes, normal appearing skin, no evidence of skin lesions/tumors    RADIOLOGY  Summary of findings:      PROCEDURE  See scanned audiogram dated 12/9/2020  for results.          Otomicroscopy and Cerumen Removal    An operating microscope was utilized to visualize the external auditory canals using a speculum. The external auditory canals where occluded with cerumen bilaterally. The cerumen was removed with instrumentation under microscopic evaluation. The bilateral tympanic membranes and ossicles are intact. No fluid visualized in the bilateral middle ears. ASSESSMENT/PLAN  Hazel Botello is a very pleasant 69 y.o. male with    1.  Sensorineural hearing loss (SNHL) of both ears  Audiogram shows a bilateral sensorineural hearing loss. He has hearing aids which he needs to get program.  We will follow his hearing loss on a yearly basis. 2. Bilateral impacted cerumen  Cerumen was removed his ears bilaterally. He will follow-up in 1 year for repeat audiogram and cerumen removal or sooner if there are cerumen accumulates faster. Medical Decision Making:   The following items were considered in medical decision making:  Independent review of images  Review / order clinical lab tests  Review / order radiology tests  Decision to obtain old records

## 2020-12-09 NOTE — Clinical Note
Dr. Shila Villasenor,    Thank you for your referral for audiometric testing on this patient. Please see the scanned audiogram (under \"Media\" tab) and encounter note for details. If you have any questions, or if there is anything else you need, please let me know.       Jodi Omer  Audiologist  ---  [address]  [facility] - Audiology

## 2021-02-25 ENCOUNTER — TELEPHONE (OUTPATIENT)
Dept: FAMILY MEDICINE CLINIC | Age: 71
End: 2021-02-25

## 2021-02-25 ENCOUNTER — IMMUNIZATION (OUTPATIENT)
Dept: PRIMARY CARE CLINIC | Age: 71
End: 2021-02-25
Payer: MEDICARE

## 2021-02-25 PROCEDURE — 0001A COVID-19, PFIZER VACCINE 30MCG/0.3ML DOSE: CPT | Performed by: FAMILY MEDICINE

## 2021-02-25 PROCEDURE — 91300 COVID-19, PFIZER VACCINE 30MCG/0.3ML DOSE: CPT | Performed by: FAMILY MEDICINE

## 2021-02-25 NOTE — TELEPHONE ENCOUNTER
Was scheduled today and we had to r/s; refusing to see anyone except Dr Yana Andersen. Please advise where to schedule.

## 2021-02-25 NOTE — TELEPHONE ENCOUNTER
Could use PV fill on 3/9 since we had to cancel or can wait until her next available OVE if refusing to see anyone else.

## 2021-03-01 ENCOUNTER — PATIENT MESSAGE (OUTPATIENT)
Dept: FAMILY MEDICINE CLINIC | Age: 71
End: 2021-03-01

## 2021-03-01 DIAGNOSIS — K21.9 GASTROESOPHAGEAL REFLUX DISEASE WITHOUT ESOPHAGITIS: ICD-10-CM

## 2021-03-01 DIAGNOSIS — E78.5 HYPERLIPIDEMIA, UNSPECIFIED HYPERLIPIDEMIA TYPE: Primary | ICD-10-CM

## 2021-03-01 DIAGNOSIS — E11.65 UNCONTROLLED TYPE 2 DIABETES MELLITUS WITH HYPERGLYCEMIA (HCC): ICD-10-CM

## 2021-03-08 DIAGNOSIS — E11.65 UNCONTROLLED TYPE 2 DIABETES MELLITUS WITH HYPERGLYCEMIA (HCC): ICD-10-CM

## 2021-03-08 DIAGNOSIS — K21.9 GASTROESOPHAGEAL REFLUX DISEASE WITHOUT ESOPHAGITIS: ICD-10-CM

## 2021-03-08 DIAGNOSIS — E78.5 HYPERLIPIDEMIA, UNSPECIFIED HYPERLIPIDEMIA TYPE: ICD-10-CM

## 2021-03-08 LAB
A/G RATIO: 1.9 (ref 1.1–2.2)
ALBUMIN SERPL-MCNC: 4.2 G/DL (ref 3.4–5)
ALP BLD-CCNC: 89 U/L (ref 40–129)
ALT SERPL-CCNC: 24 U/L (ref 10–40)
ANION GAP SERPL CALCULATED.3IONS-SCNC: 10 MMOL/L (ref 3–16)
AST SERPL-CCNC: 27 U/L (ref 15–37)
BASOPHILS ABSOLUTE: 0 K/UL (ref 0–0.2)
BASOPHILS RELATIVE PERCENT: 0.7 %
BILIRUB SERPL-MCNC: 0.6 MG/DL (ref 0–1)
BUN BLDV-MCNC: 14 MG/DL (ref 7–20)
CALCIUM SERPL-MCNC: 9.5 MG/DL (ref 8.3–10.6)
CHLORIDE BLD-SCNC: 102 MMOL/L (ref 99–110)
CHOLESTEROL, TOTAL: 151 MG/DL (ref 0–199)
CO2: 28 MMOL/L (ref 21–32)
CREAT SERPL-MCNC: 1 MG/DL (ref 0.8–1.3)
EOSINOPHILS ABSOLUTE: 0.2 K/UL (ref 0–0.6)
EOSINOPHILS RELATIVE PERCENT: 5.2 %
GFR AFRICAN AMERICAN: >60
GFR NON-AFRICAN AMERICAN: >60
GLOBULIN: 2.2 G/DL
GLUCOSE BLD-MCNC: 180 MG/DL (ref 70–99)
HCT VFR BLD CALC: 38.1 % (ref 40.5–52.5)
HDLC SERPL-MCNC: 57 MG/DL (ref 40–60)
HEMOGLOBIN: 13 G/DL (ref 13.5–17.5)
LDL CHOLESTEROL CALCULATED: 78 MG/DL
LYMPHOCYTES ABSOLUTE: 1.5 K/UL (ref 1–5.1)
LYMPHOCYTES RELATIVE PERCENT: 33.9 %
MAGNESIUM: 1.7 MG/DL (ref 1.8–2.4)
MCH RBC QN AUTO: 29.6 PG (ref 26–34)
MCHC RBC AUTO-ENTMCNC: 34.1 G/DL (ref 31–36)
MCV RBC AUTO: 86.7 FL (ref 80–100)
MONOCYTES ABSOLUTE: 0.4 K/UL (ref 0–1.3)
MONOCYTES RELATIVE PERCENT: 9.4 %
NEUTROPHILS ABSOLUTE: 2.2 K/UL (ref 1.7–7.7)
NEUTROPHILS RELATIVE PERCENT: 50.8 %
PDW BLD-RTO: 13.5 % (ref 12.4–15.4)
PLATELET # BLD: 254 K/UL (ref 135–450)
PMV BLD AUTO: 8.9 FL (ref 5–10.5)
POTASSIUM SERPL-SCNC: 4.2 MMOL/L (ref 3.5–5.1)
RBC # BLD: 4.39 M/UL (ref 4.2–5.9)
SODIUM BLD-SCNC: 140 MMOL/L (ref 136–145)
TOTAL PROTEIN: 6.4 G/DL (ref 6.4–8.2)
TRIGL SERPL-MCNC: 82 MG/DL (ref 0–150)
TSH REFLEX: 1.82 UIU/ML (ref 0.27–4.2)
VITAMIN B-12: 728 PG/ML (ref 211–911)
VLDLC SERPL CALC-MCNC: 16 MG/DL
WBC # BLD: 4.3 K/UL (ref 4–11)

## 2021-03-09 ENCOUNTER — OFFICE VISIT (OUTPATIENT)
Dept: FAMILY MEDICINE CLINIC | Age: 71
End: 2021-03-09
Payer: MEDICARE

## 2021-03-09 VITALS
SYSTOLIC BLOOD PRESSURE: 138 MMHG | OXYGEN SATURATION: 98 % | DIASTOLIC BLOOD PRESSURE: 80 MMHG | TEMPERATURE: 98.1 F | HEART RATE: 70 BPM | BODY MASS INDEX: 33.19 KG/M2 | HEIGHT: 68 IN | WEIGHT: 219 LBS

## 2021-03-09 DIAGNOSIS — E83.42 HYPOMAGNESEMIA: ICD-10-CM

## 2021-03-09 DIAGNOSIS — K21.9 GASTROESOPHAGEAL REFLUX DISEASE WITHOUT ESOPHAGITIS: ICD-10-CM

## 2021-03-09 DIAGNOSIS — E78.5 HYPERLIPIDEMIA, UNSPECIFIED HYPERLIPIDEMIA TYPE: ICD-10-CM

## 2021-03-09 DIAGNOSIS — E11.65 UNCONTROLLED TYPE 2 DIABETES MELLITUS WITH HYPERGLYCEMIA (HCC): Primary | ICD-10-CM

## 2021-03-09 DIAGNOSIS — B35.1 ONYCHOMYCOSIS: ICD-10-CM

## 2021-03-09 DIAGNOSIS — Z12.11 COLON CANCER SCREENING: ICD-10-CM

## 2021-03-09 DIAGNOSIS — R97.20 ELEVATED PSA: ICD-10-CM

## 2021-03-09 LAB
ESTIMATED AVERAGE GLUCOSE: 177.2 MG/DL
HBA1C MFR BLD: 7.8 %

## 2021-03-09 PROCEDURE — 1123F ACP DISCUSS/DSCN MKR DOCD: CPT | Performed by: FAMILY MEDICINE

## 2021-03-09 PROCEDURE — 2022F DILAT RTA XM EVC RTNOPTHY: CPT | Performed by: FAMILY MEDICINE

## 2021-03-09 PROCEDURE — 3017F COLORECTAL CA SCREEN DOC REV: CPT | Performed by: FAMILY MEDICINE

## 2021-03-09 PROCEDURE — G8427 DOCREV CUR MEDS BY ELIG CLIN: HCPCS | Performed by: FAMILY MEDICINE

## 2021-03-09 PROCEDURE — G8417 CALC BMI ABV UP PARAM F/U: HCPCS | Performed by: FAMILY MEDICINE

## 2021-03-09 PROCEDURE — 4040F PNEUMOC VAC/ADMIN/RCVD: CPT | Performed by: FAMILY MEDICINE

## 2021-03-09 PROCEDURE — G8482 FLU IMMUNIZE ORDER/ADMIN: HCPCS | Performed by: FAMILY MEDICINE

## 2021-03-09 PROCEDURE — 3051F HG A1C>EQUAL 7.0%<8.0%: CPT | Performed by: FAMILY MEDICINE

## 2021-03-09 PROCEDURE — 1036F TOBACCO NON-USER: CPT | Performed by: FAMILY MEDICINE

## 2021-03-09 PROCEDURE — 99214 OFFICE O/P EST MOD 30 MIN: CPT | Performed by: FAMILY MEDICINE

## 2021-03-09 RX ORDER — ACARBOSE 100 MG/1
TABLET ORAL
Qty: 90 TABLET | Refills: 1 | Status: SHIPPED | OUTPATIENT
Start: 2021-03-09 | End: 2022-03-30 | Stop reason: SDUPTHER

## 2021-03-09 RX ORDER — PANTOPRAZOLE SODIUM 20 MG/1
20 TABLET, DELAYED RELEASE ORAL
Qty: 90 TABLET | Refills: 3 | Status: SHIPPED | OUTPATIENT
Start: 2021-03-09 | End: 2022-02-07

## 2021-03-09 RX ORDER — METFORMIN HYDROCHLORIDE 750 MG/1
TABLET, EXTENDED RELEASE ORAL
Qty: 180 TABLET | Refills: 3 | Status: SHIPPED | OUTPATIENT
Start: 2021-03-09 | End: 2022-01-11

## 2021-03-09 RX ORDER — NAPROXEN SODIUM 220 MG
1 TABLET ORAL 4 TIMES DAILY
Qty: 400 EACH | Refills: 3 | Status: SHIPPED | OUTPATIENT
Start: 2021-03-09 | End: 2022-04-06

## 2021-03-09 RX ORDER — TADALAFIL 5 MG/1
5 TABLET ORAL DAILY
Qty: 90 TABLET | Refills: 3 | Status: SHIPPED | OUTPATIENT
Start: 2021-03-09 | End: 2022-03-30 | Stop reason: SDUPTHER

## 2021-03-09 RX ORDER — LANCETS 30 GAUGE
EACH MISCELLANEOUS
Qty: 500 EACH | Refills: 3 | Status: SHIPPED | OUTPATIENT
Start: 2021-03-09 | End: 2021-05-27 | Stop reason: SDUPTHER

## 2021-03-09 RX ORDER — ATORVASTATIN CALCIUM 40 MG/1
TABLET, FILM COATED ORAL
Qty: 90 TABLET | Refills: 3 | Status: SHIPPED | OUTPATIENT
Start: 2021-03-09 | End: 2022-01-11

## 2021-03-09 RX ORDER — LOSARTAN POTASSIUM 25 MG/1
TABLET ORAL
Qty: 90 TABLET | Refills: 3 | Status: SHIPPED | OUTPATIENT
Start: 2021-03-09 | End: 2021-06-28

## 2021-03-09 RX ORDER — FAMOTIDINE 20 MG/1
20 TABLET, FILM COATED ORAL 2 TIMES DAILY
Qty: 180 TABLET | Refills: 3 | Status: SHIPPED | OUTPATIENT
Start: 2021-03-09 | End: 2022-01-11

## 2021-03-09 ASSESSMENT — PATIENT HEALTH QUESTIONNAIRE - PHQ9
SUM OF ALL RESPONSES TO PHQ QUESTIONS 1-9: 0
SUM OF ALL RESPONSES TO PHQ QUESTIONS 1-9: 0
1. LITTLE INTEREST OR PLEASURE IN DOING THINGS: 0
SUM OF ALL RESPONSES TO PHQ9 QUESTIONS 1 & 2: 0
SUM OF ALL RESPONSES TO PHQ QUESTIONS 1-9: 0

## 2021-03-10 DIAGNOSIS — J20.9 ACUTE BRONCHITIS WITH BRONCHOSPASM: ICD-10-CM

## 2021-03-11 RX ORDER — ALBUTEROL SULFATE 2.5 MG/3ML
2.5 SOLUTION RESPIRATORY (INHALATION) EVERY 4 HOURS PRN
Qty: 120 EACH | Refills: 5 | Status: SHIPPED | OUTPATIENT
Start: 2021-03-11

## 2021-03-11 RX ORDER — GLUCOSAMINE HCL/CHONDROITIN SU 500-400 MG
CAPSULE ORAL
Qty: 500 STRIP | Refills: 3 | Status: SHIPPED | OUTPATIENT
Start: 2021-03-11 | End: 2021-04-29 | Stop reason: SDUPTHER

## 2021-03-11 RX ORDER — BLOOD SUGAR DIAGNOSTIC
1 STRIP MISCELLANEOUS 2 TIMES DAILY
Qty: 100 EACH | Refills: 5 | Status: SHIPPED | OUTPATIENT
Start: 2021-03-11

## 2021-03-11 NOTE — PROGRESS NOTES
Aaliyah Portillo (:  1950) is a 69 y.o. male,Established patient, here for evaluation of the following chief complaint(s):  Diabetes (Eye exam with Dr. Glenna CASTANEDA, [facility]) and Nail Problem      ASSESSMENT/PLAN:  1. Uncontrolled type 2 diabetes mellitus with hyperglycemia (HCC)  Much improved. Will continue current meds and encouraged to continue diet low in simple carbohydrates. 2. Uncontrolled type 2 diabetes mellitus with retinopathy, with long-term current use of insulin (HCC)  -     acarbose (PRECOSE) 100 MG tablet; TAKE 1 TABLET BY MOUTH THREE TIMES DAILY WITH MEALS, Disp-90 tablet, R-1Please consider 90 day supplies to promote better adherenceNormal  -     atorvastatin (LIPITOR) 40 MG tablet; TAKE 1 TABLET BY MOUTH ONE TIME A DAY, Disp-90 tablet, R-3Normal  -     insulin 70-30 (NOVOLIN 70/30) (70-30) 100 UNIT per ML injection vial; Inject 25 units in the morning and 10-15 units nightly, Disp-3 vial, R-1Normal  -     Insulin Syringe-Needle U-100 (INSULIN SYRINGE .5CC/31GX5/16\") 31G X 5/16\" 0.5 ML MISC; 4 TIMES DAILY Starting Tue 3/9/2021, Disp-400 each, R-3, Normal  -     Lancets MISC; Disp-500 each, R-3, NormalDx 250.02, use to test blood glucose 5 times daily  -     losartan (COZAAR) 25 MG tablet; Take one tablet by mouth once daily, Disp-90 tablet, R-3Normal  -     metFORMIN (GLUCOPHAGE-XR) 750 MG extended release tablet; TAKE 2 TABLETS BY MOUTH ONCE DAILY WITH BREAKFAST, Disp-180 tablet, R-3Normal  Continue follow up with his eye doctor at [facility] every 3 months as scheduled. He is much improved on his overall hemoglobin A1C, but discussed a good goal for him would be 7.5% or less. He will continue to work towards this goal.   3. Gastroesophageal reflux disease without esophagitis  -     famotidine (PEPCID) 20 MG tablet; Take 1 tablet by mouth 2 times daily, Disp-180 tablet, R-3Normal  -     pantoprazole (PROTONIX) 20 MG tablet;  Take 1 tablet by mouth every morning (before breakfast), Disp-90 tablet, R-3Normal  This problem is well controlled. Pt should continue current medication at current dose. 4. Colon cancer screening  -     Cologuard (For External Results Only); Future  5. Hypomagnesemia  -     magnesium oxide (MAG-OX) 400 (241.3 Mg) MG TABS tablet; Take 1 tablet by mouth daily, Disp-90 tablet, R-0Normal  Found to be low on labs yesterday. Will start supplement and recheck at his next visit. Discussed this is likely from his Protonix. 6. Onychomycosis  -     ciclopirox (PENLAC) 8 % solution; Apply topically nightly., Disp-3 Bottle, R-0, Normal  Call or return to clinic prn if these symptoms worsen or fail to improve as anticipated. 7. Hyperlipidemia, unspecified hyperlipidemia type  -     atorvastatin (LIPITOR) 40 MG tablet; TAKE 1 TABLET BY MOUTH ONE TIME A DAY, Disp-90 tablet, R-3Normal  This problem is well controlled. Pt should continue current medication at current dose. 8. Elevated PSA  Patient states that urologist was not concerned about this and did not feel it was cancer. He states he did not need a biopsy, despite the extremely high level of PSA. Return in about 3 months (around 6/9/2021) for Diabetes. SUBJECTIVE/OBJECTIVE:  HPI   Here for diabetes follow up. Saw his labs from yesterday, was happy with his hemoglobin A1C. Continues to have some hypoglycemia with insulin. Taking Protonix and Pepcid for GERD causing chronic cough. He needs a refill. Onychomycosis: Patient complains of abnormal appearing toenails. Symptoms have been ongoing for about several months, and include increasing thickness, darkening color, unsightliness. Previous treatment has included topical antifungal cream, with poor improvement. Known liver disease? no.        Review of Systems    Physical Exam  Vitals signs and nursing note reviewed. Constitutional:       Appearance: Normal appearance.    Pulmonary:      Effort: Pulmonary effort is normal.   Feet:      Right foot:      Toenail Condition: Right toenails are abnormally thick. Fungal disease present. Left foot:      Toenail Condition: Left toenails are abnormally thick. Fungal disease present. Neurological:      General: No focal deficit present. Mental Status: He is alert.          Lab Review   Orders Only on 03/08/2021   Component Date Value    Hemoglobin A1C 03/08/2021 7.8     eAG 03/08/2021 177.2     Magnesium 03/08/2021 1.70*    Vitamin B-12 03/08/2021 728     Cholesterol, Total 03/08/2021 151     Triglycerides 03/08/2021 82     HDL 03/08/2021 57     LDL Calculated 03/08/2021 78     VLDL Cholesterol Calcula* 03/08/2021 16     TSH 03/08/2021 1.82     Sodium 03/08/2021 140     Potassium 03/08/2021 4.2     Chloride 03/08/2021 102     CO2 03/08/2021 28     Anion Gap 03/08/2021 10     Glucose 03/08/2021 180*    BUN 03/08/2021 14     CREATININE 03/08/2021 1.0     GFR Non- 03/08/2021 >60     GFR  03/08/2021 >60     Calcium 03/08/2021 9.5     Total Protein 03/08/2021 6.4     Albumin 03/08/2021 4.2     Albumin/Globulin Ratio 03/08/2021 1.9     Total Bilirubin 03/08/2021 0.6     Alkaline Phosphatase 03/08/2021 89     ALT 03/08/2021 24     AST 03/08/2021 27     Globulin 03/08/2021 2.2     WBC 03/08/2021 4.3     RBC 03/08/2021 4.39     Hemoglobin 03/08/2021 13.0*    Hematocrit 03/08/2021 38.1*    MCV 03/08/2021 86.7     MCH 03/08/2021 29.6     MCHC 03/08/2021 34.1     RDW 03/08/2021 13.5     Platelets 60/19/1956 254     MPV 03/08/2021 8.9     Neutrophils % 03/08/2021 50.8     Lymphocytes % 03/08/2021 33.9     Monocytes % 03/08/2021 9.4     Eosinophils % 03/08/2021 5.2     Basophils % 03/08/2021 0.7     Neutrophils Absolute 03/08/2021 2.2     Lymphocytes Absolute 03/08/2021 1.5     Monocytes Absolute 03/08/2021 0.4     Eosinophils Absolute 03/08/2021 0.2     Basophils Absolute 03/08/2021 0.0    Orders Only on 11/25/2020   Component Date Value    PSA 11/25/2020 15.0*    PSA, Free 11/25/2020 1.9     PSA, Free Pct 11/25/2020 12.7    Office Visit on 11/16/2020   Component Date Value    Hemoglobin A1C 11/16/2020 8.5              An electronic signature was used to authenticate this note.     --Mio Stanton MD

## 2021-03-11 NOTE — TELEPHONE ENCOUNTER
Refill Request     Last Seen: 3/9/2021     Last Written: Albuterol 2019 #120 with 5 refills, Alcohol pads 04/15/2019 #100 with 5 refills, and Test strips 2020 #300 with 3 refills     Next Appointment:   Future Appointments   Date Time Provider Som Szymanskii   3/18/2021 10:30 AM [facility] CLER COVID IMM, PFIZER 21 DAY SECOND DOSE [facility] CLR IMM MMA   2021  9:30 AM MD HANDY Aguiar [facility]       Future appointment scheduled      Requested Prescriptions     Pending Prescriptions Disp Refills    albuterol (PROVENTIL) (2.5 MG/3ML) 0.083% nebulizer solution 120 each 5     Sig: Take 3 mLs by nebulization every 4 hours as needed for Wheezing or Shortness of Breath    Alcohol Swabs (ALCOHOL PADS) 70 % PADS 100 each 5     Si each by Does not apply route 2 times daily    blood glucose monitor strips 500 strip 3     Sig: Test 7 times a day & as needed for symptoms of irregular blood glucose. Freestyle Strips per patient.  Dx: E11.65

## 2021-03-18 ENCOUNTER — IMMUNIZATION (OUTPATIENT)
Dept: PRIMARY CARE CLINIC | Age: 71
End: 2021-03-18
Payer: MEDICARE

## 2021-03-18 PROCEDURE — 91300 COVID-19, PFIZER VACCINE 30MCG/0.3ML DOSE: CPT | Performed by: FAMILY MEDICINE

## 2021-03-18 PROCEDURE — 0002A COVID-19, PFIZER VACCINE 30MCG/0.3ML DOSE: CPT | Performed by: FAMILY MEDICINE

## 2021-04-29 ENCOUNTER — PATIENT MESSAGE (OUTPATIENT)
Dept: FAMILY MEDICINE CLINIC | Age: 71
End: 2021-04-29

## 2021-04-29 NOTE — TELEPHONE ENCOUNTER
Refill Request     Last Seen: Last Seen Department: Visit date not found  Last Seen by PCP: 3/9/2021    Last Written: 2017    Next Appointment:   Future Appointments   Date Time Provider Som Shayy   2021  9:30 AM MD HANDY Huitron [facility]       Future appointment scheduled      Requested Prescriptions     Pending Prescriptions Disp Refills    Blood Glucose Monitoring Suppl (FREESTYLE FREEDOM LITE) w/Device KIT 1 kit 0     Si kit by Does not apply route daily    blood glucose monitor strips 500 strip 3     Sig: Test 7 times a day & as needed for symptoms of irregular blood glucose. Freestyle Strips per patient.  Dx: E11.65

## 2021-04-30 RX ORDER — GLUCOSAMINE HCL/CHONDROITIN SU 500-400 MG
CAPSULE ORAL
Qty: 400 STRIP | Refills: 3 | Status: SHIPPED | OUTPATIENT
Start: 2021-04-30 | End: 2021-05-03 | Stop reason: SDUPTHER

## 2021-04-30 RX ORDER — GLUCOSAMINE HCL/CHONDROITIN SU 500-400 MG
CAPSULE ORAL
Qty: 500 STRIP | Refills: 3 | Status: SHIPPED | OUTPATIENT
Start: 2021-04-30 | End: 2021-04-30

## 2021-04-30 RX ORDER — BLOOD-GLUCOSE METER
1 KIT MISCELLANEOUS DAILY
Qty: 1 KIT | Refills: 0 | Status: SHIPPED | OUTPATIENT
Start: 2021-04-30

## 2021-05-03 NOTE — TELEPHONE ENCOUNTER
Recently sent but prescription directions had as needed included and with medicare cannot say as needed.   Please resubmit

## 2021-05-04 RX ORDER — GLUCOSAMINE HCL/CHONDROITIN SU 500-400 MG
CAPSULE ORAL
Qty: 400 STRIP | Refills: 3 | Status: SHIPPED | OUTPATIENT
Start: 2021-05-04 | End: 2021-05-27 | Stop reason: SDUPTHER

## 2021-05-13 NOTE — TELEPHONE ENCOUNTER
Pharmacy calling because the insurance will not cover pts lancets and strips no longer than 3 days without documentation. Pharmacy stated that they faxed over forms on 5/4 and 5/12. Pharmacy was calling on the status of getting that filled out.  Please Advise

## 2021-05-17 NOTE — TELEPHONE ENCOUNTER
Form received, attached clinical documentation requested & faxed today 5/17/2021.  See attached form/

## 2021-05-27 ENCOUNTER — TELEPHONE (OUTPATIENT)
Dept: FAMILY MEDICINE CLINIC | Age: 71
End: 2021-05-27

## 2021-05-27 NOTE — TELEPHONE ENCOUNTER
Olaf Carvajal from [employer] will not cover patient's lancets and testing strips for four times daily, they will only cover testing for three times daily. The latest they received is test four times daily. The [employer] is asking for new rx for testing three times daily       Please review/advise.

## 2021-06-21 ENCOUNTER — TELEPHONE (OUTPATIENT)
Dept: FAMILY MEDICINE CLINIC | Age: 71
End: 2021-06-21

## 2021-06-21 RX ORDER — GLUCOSAMINE HCL/CHONDROITIN SU 500-400 MG
1 CAPSULE ORAL
Qty: 300 STRIP | Refills: 3 | Status: SHIPPED | OUTPATIENT
Start: 2021-06-21

## 2021-06-21 RX ORDER — LANCETS 30 GAUGE
1 EACH MISCELLANEOUS 3 TIMES DAILY
Qty: 300 EACH | Refills: 3 | Status: SHIPPED | OUTPATIENT
Start: 2021-06-21

## 2021-06-24 ENCOUNTER — OFFICE VISIT (OUTPATIENT)
Dept: FAMILY MEDICINE CLINIC | Age: 71
End: 2021-06-24
Payer: MEDICARE

## 2021-06-24 VITALS
WEIGHT: 215 LBS | SYSTOLIC BLOOD PRESSURE: 136 MMHG | HEIGHT: 68 IN | BODY MASS INDEX: 32.58 KG/M2 | HEART RATE: 67 BPM | DIASTOLIC BLOOD PRESSURE: 72 MMHG | OXYGEN SATURATION: 96 %

## 2021-06-24 DIAGNOSIS — Z12.11 COLON CANCER SCREENING: ICD-10-CM

## 2021-06-24 DIAGNOSIS — R97.20 ELEVATED PSA, BETWEEN 10 AND LESS THAN 20 NG/ML: ICD-10-CM

## 2021-06-24 DIAGNOSIS — E83.42 HYPOMAGNESEMIA: ICD-10-CM

## 2021-06-24 DIAGNOSIS — R11.10 POSTPRANDIAL VOMITING: ICD-10-CM

## 2021-06-24 LAB
CREATININE URINE POCT: NORMAL
HBA1C MFR BLD: 9 %
MAGNESIUM: 1.8 MG/DL (ref 1.8–2.4)
MICROALBUMIN/CREAT 24H UR: NORMAL MG/G{CREAT}
MICROALBUMIN/CREAT UR-RTO: NORMAL

## 2021-06-24 PROCEDURE — 99214 OFFICE O/P EST MOD 30 MIN: CPT | Performed by: FAMILY MEDICINE

## 2021-06-24 PROCEDURE — 4040F PNEUMOC VAC/ADMIN/RCVD: CPT | Performed by: FAMILY MEDICINE

## 2021-06-24 PROCEDURE — G8428 CUR MEDS NOT DOCUMENT: HCPCS | Performed by: FAMILY MEDICINE

## 2021-06-24 PROCEDURE — 1123F ACP DISCUSS/DSCN MKR DOCD: CPT | Performed by: FAMILY MEDICINE

## 2021-06-24 PROCEDURE — 2022F DILAT RTA XM EVC RTNOPTHY: CPT | Performed by: FAMILY MEDICINE

## 2021-06-24 PROCEDURE — 82044 UR ALBUMIN SEMIQUANTITATIVE: CPT | Performed by: FAMILY MEDICINE

## 2021-06-24 PROCEDURE — 3017F COLORECTAL CA SCREEN DOC REV: CPT | Performed by: FAMILY MEDICINE

## 2021-06-24 PROCEDURE — 3052F HG A1C>EQUAL 8.0%<EQUAL 9.0%: CPT | Performed by: FAMILY MEDICINE

## 2021-06-24 PROCEDURE — 1036F TOBACCO NON-USER: CPT | Performed by: FAMILY MEDICINE

## 2021-06-24 PROCEDURE — 83036 HEMOGLOBIN GLYCOSYLATED A1C: CPT | Performed by: FAMILY MEDICINE

## 2021-06-24 PROCEDURE — G8417 CALC BMI ABV UP PARAM F/U: HCPCS | Performed by: FAMILY MEDICINE

## 2021-06-24 NOTE — PATIENT INSTRUCTIONS
Patient Education        Learning About Low-Carbohydrate Foods  What foods are low in carbohydrate? The foods you eat contain nutrients, such as vitamins and minerals. Carbohydrate is a nutrient. Your body needs the right amount to stay healthy and work as it should. You can use the list below to help you make choices about which foods to eat. Some foods that are lower in carbohydrate include:  Dairy and dairy alternatives  · Cheese  · Cottage cheese  · Cream cheese  · Nut milk (unsweetened)  · Soy milk (unsweetened)  · Yogurt (Greek, plain)  Fruits  · Avocado  · [employer] and other protein foods  · Almonds  · Beef  · Chicken  · Cod  · Eggs  · Halibut  · Peanut butter and other nut butters  · Pistachios  · Pork  · Pumpkin seeds  · Tofu  · Trout  · [place]  · [place] ([facility])  · Walnuts  Vegetables  · Broccoli  · Carrots  · Cauliflower  · Green beans  · Mushrooms  · Peppers  · Salad greens  · Spinach  · Tomatoes  Work with your doctor to find out how much of this nutrient you need. Depending on your health, you may need more or less of it in your diet. Where can you learn more? Go to [email or web address].[email or web address]. org and sign in to your [employer] account. Enter [phone] in the [facility] box to learn more about \"Learning About Low-Carbohydrate Foods. \"     If you do not have an account, please click on the \"Sign Up Now\" link. Current as of: December 17, 2020               Content Version: 12.9  © 4715-0354 [employer]wise, Incorporated. Care instructions adapted under license by [facility] ([facility]). If you have questions about a medical condition or this instruction, always ask your healthcare professional. [place] [address] any warranty or liability for your use of this information.

## 2021-06-24 NOTE — PROGRESS NOTES
Kamala Clark (:  1950) is a 69 y.o. male,Established patient, here for evaluation of the following chief complaint(s):  Diabetes (sugars high, sick at night, eating bad, no energy, x3 months since vaccine )         ASSESSMENT/PLAN:  1. Uncontrolled type 2 diabetes mellitus with retinopathy, with long-term current use of insulin (HCC)  -     POCT glycosylated hemoglobin (Hb A1C)  -     POCT microalbumin  -     losartan (COZAAR) 50 MG tablet; Take one tablet by mouth once daily, Disp-90 tablet, R-1Normal  Counseling at today's visit: focused on the need for regular aerobic exercise, discussed the advantages of a diet low in carbohydrates, reminded to check sugars regularly and to bring readings in at the time of the next visit and reminded to log sugars into [employer]. Reminded to get yearly retinal exam.  Discussed ways to avoid symptomatic hypoglycemia. 2. Uncontrolled diabetes mellitus with microalbuminuria (HCC)  -     losartan (COZAAR) 50 MG tablet; Take one tablet by mouth once daily, Disp-90 tablet, R-1Normal  Increase losartan dose per orders. Recheck microalbumin at next visit. 3. Hypomagnesemia  -     Magnesium  Low at last visit, will recheck today. 4. Postprandial vomiting  -     NM GASTRIC EMPTYING; Future  5. Colon cancer screening  -     Cologuard (For External Results Only); Future  6. Elevated PSA, between 10 and less than 20 ng/ml  -     PSA, TOTAL AND FREE  Elevated, not currently seeing urology. Will recheck. Return in about 3 months (around 2021) for Diabetes. Subjective   SUBJECTIVE/OBJECTIVE:  HPI   Here for diabetes follow up. Has had fluctuations in blood sugars since getting the COVID vaccine. Notes more high blood sugars than usual. He is not eating regularly, and often doesn't feel hungry, but then will have hypoglycemia. He drinks juice to get his sugar up again. Has been checking sugars, but not writing them down.      Has been fatigued more lately. Hard to wake up in the morning. More irritable. Taking longer to get tasks done. Depending on what he eats, feels like food isn't digesting. Make himself vomit at night at night times because he doesn't feel like the food moves through. Constant phlegm. Has had chronically elevated PSA. [facility] to [facility], but he has retired. Was unable to have MRI prostate due to anxiety/panic in the machine. He last had it checked in November 2020. Review of Systems       Objective   Physical Exam  Vitals and nursing note reviewed. Constitutional:       General: He is not in acute distress. Appearance: He is well-developed. He is not diaphoretic. HENT:      Head: Normocephalic and atraumatic. Eyes:      General: No scleral icterus. Conjunctiva/sclera: Conjunctivae normal.   Cardiovascular:      Rate and Rhythm: Normal rate and regular rhythm. Heart sounds: Normal heart sounds. No murmur heard. No friction rub. No gallop. Pulmonary:      Effort: Pulmonary effort is normal. No respiratory distress. Breath sounds: Normal breath sounds. No wheezing or rales. Abdominal:      General: Bowel sounds are normal. There is no distension. Palpations: Abdomen is soft. There is no mass. Tenderness: There is no abdominal tenderness. There is no guarding or rebound. Neurological:      Mental Status: He is alert. Results for POC orders placed in visit on 06/24/21   POCT microalbumin   Result Value Ref Range    Microalb, Ur 150mg/L     Creatinine Ur POCT 200 mg/L     Microalbumin Creatinine Ratio >300mg/g    POCT glycosylated hemoglobin (Hb A1C)   Result Value Ref Range    Hemoglobin A1C 9.0 %         An electronic signature was used to authenticate this note.     --Kenny Donato MD

## 2021-06-28 RX ORDER — LOSARTAN POTASSIUM 50 MG/1
TABLET ORAL
Qty: 90 TABLET | Refills: 1 | Status: SHIPPED | OUTPATIENT
Start: 2021-06-28 | End: 2021-07-16

## 2021-06-30 LAB
PROSTATE SPECIFIC ANTIGEN FREE: 2.1 UG/L
PROSTATE SPECIFIC ANTIGEN PERCENT FREE: 13.5 %
PROSTATE SPECIFIC ANTIGEN: 15.5 UG/L (ref 0–4)

## 2021-07-16 RX ORDER — LOSARTAN POTASSIUM 25 MG/1
25 TABLET ORAL 2 TIMES DAILY
Qty: 180 TABLET | Refills: 1 | Status: SHIPPED | OUTPATIENT
Start: 2021-07-16 | End: 2021-07-29 | Stop reason: SDUPTHER

## 2021-07-29 ENCOUNTER — TELEPHONE (OUTPATIENT)
Dept: FAMILY MEDICINE CLINIC | Age: 71
End: 2021-07-29

## 2021-07-29 RX ORDER — LOSARTAN POTASSIUM 25 MG/1
25 TABLET ORAL 2 TIMES DAILY
Qty: 180 TABLET | Refills: 1 | Status: SHIPPED | OUTPATIENT
Start: 2021-07-29 | End: 2022-03-25 | Stop reason: SDUPTHER

## 2021-07-29 NOTE — TELEPHONE ENCOUNTER
Pharmacy needing clarification on directions for Losartan. There are two different sets of directions.

## 2021-11-10 ENCOUNTER — PATIENT MESSAGE (OUTPATIENT)
Dept: FAMILY MEDICINE CLINIC | Age: 71
End: 2021-11-10

## 2021-11-10 DIAGNOSIS — Z12.83 SKIN CANCER SCREENING: Primary | ICD-10-CM

## 2022-01-07 ENCOUNTER — TELEPHONE (OUTPATIENT)
Dept: FAMILY MEDICINE CLINIC | Age: 72
End: 2022-01-07

## 2022-01-10 ENCOUNTER — PROCEDURE VISIT (OUTPATIENT)
Dept: ENT CLINIC | Age: 72
End: 2022-01-10
Payer: MEDICARE

## 2022-01-10 VITALS
TEMPERATURE: 97.2 F | DIASTOLIC BLOOD PRESSURE: 77 MMHG | WEIGHT: 210 LBS | SYSTOLIC BLOOD PRESSURE: 168 MMHG | HEIGHT: 67 IN | HEART RATE: 72 BPM | BODY MASS INDEX: 32.96 KG/M2

## 2022-01-10 DIAGNOSIS — H90.3 SENSORINEURAL HEARING LOSS (SNHL) OF BOTH EARS: ICD-10-CM

## 2022-01-10 DIAGNOSIS — H61.23 BILATERAL IMPACTED CERUMEN: Primary | ICD-10-CM

## 2022-01-10 PROCEDURE — 4040F PNEUMOC VAC/ADMIN/RCVD: CPT | Performed by: STUDENT IN AN ORGANIZED HEALTH CARE EDUCATION/TRAINING PROGRAM

## 2022-01-10 PROCEDURE — 69210 REMOVE IMPACTED EAR WAX UNI: CPT | Performed by: STUDENT IN AN ORGANIZED HEALTH CARE EDUCATION/TRAINING PROGRAM

## 2022-01-10 PROCEDURE — 3017F COLORECTAL CA SCREEN DOC REV: CPT | Performed by: STUDENT IN AN ORGANIZED HEALTH CARE EDUCATION/TRAINING PROGRAM

## 2022-01-10 PROCEDURE — 1123F ACP DISCUSS/DSCN MKR DOCD: CPT | Performed by: STUDENT IN AN ORGANIZED HEALTH CARE EDUCATION/TRAINING PROGRAM

## 2022-01-10 PROCEDURE — 1036F TOBACCO NON-USER: CPT | Performed by: STUDENT IN AN ORGANIZED HEALTH CARE EDUCATION/TRAINING PROGRAM

## 2022-01-10 PROCEDURE — G8427 DOCREV CUR MEDS BY ELIG CLIN: HCPCS | Performed by: STUDENT IN AN ORGANIZED HEALTH CARE EDUCATION/TRAINING PROGRAM

## 2022-01-10 PROCEDURE — G8484 FLU IMMUNIZE NO ADMIN: HCPCS | Performed by: STUDENT IN AN ORGANIZED HEALTH CARE EDUCATION/TRAINING PROGRAM

## 2022-01-10 PROCEDURE — G8417 CALC BMI ABV UP PARAM F/U: HCPCS | Performed by: STUDENT IN AN ORGANIZED HEALTH CARE EDUCATION/TRAINING PROGRAM

## 2022-01-10 ASSESSMENT — ENCOUNTER SYMPTOMS
COUGH: 0
VOMITING: 0
RHINORRHEA: 0
NAUSEA: 0
EYE PAIN: 0
SHORTNESS OF BREATH: 0

## 2022-01-10 NOTE — LETTER
[address] [facility]  [address] [facility] [address] [facility] [address] [address]  Phone: [phone]  Fax: Liliana Becerra 171, DO    January 10, 2022     Ritchie Freitas MD  [address] [place] [address] [address] [facility] [address]    Patient: Shasha Monroe   MR Number: [id number]   YOB: 1950   Date of Visit: 1/10/2022       Dear Ritchie Freitas: Thank you for referring Becca Chery to me for evaluation/treatment. Below are the relevant portions of my assessment and plan of care. If you have questions, please do not hesitate to call me. I look forward to following Sally Garzon along with you.     Sincerely,      [employer], DO

## 2022-01-10 NOTE — PROGRESS NOTES
[facility]      Patient Name: Heather Sutherland  Medical Record Number:  [id number]  Primary Care Physician:  Christy Sandoval MD  Date of Consultation: 1/10/2022    Chief Complaint:   Chief Complaint   Patient presents with    Cerumen Impaction     Here to get ears cleaned, no other concerns        HISTORY OF PRESENT ILLNESS  Lorelei Landa is a(n) 66 y.o. male who presents with cerumen impaction and hearing loss. He is here today for cerumen removal and audiogram.  He denies any changes. His hearing has been stable from previous evaluation. He did bring with him hearing aids which he purchased through the mail. Interval history  Has been approximately 1 year since I last saw Terry. He states his ear has been clogged for several months. He is not overly concerned with his hearing at this point time. Patient Active Problem List   Diagnosis    GERD (gastroesophageal reflux disease)    Hyperlipidemia    Erectile dysfunction    Low back pain    Hypotestosteronism    Diabetes type 2, uncontrolled (HCC)    Mild nonproliferative diabetic retinopathy ([address]    Diabetic retinopathy ([address]    Sensorineural hearing loss, bilateral     Past Surgical History:   Procedure Laterality Date    KNEE GANGLION SURGERY      left wrist and right anle    VASECTOMY       Family History   Problem Relation Age of Onset    Stroke Mother     Cancer Father     Diabetes Father     Diabetes Maternal Grandfather     Diabetes Paternal Grandmother      Social History     Socioeconomic History    Marital status:      Spouse name: Not on file    Number of children: Not on file    Years of education: Not on file    Highest education level: Not on file   Occupational History    Not on file   Tobacco Use    Smoking status: Never Smoker    Smokeless tobacco: Never Used   Vaping Use    Vaping Use: Never used   Substance and Sexual Activity    Alcohol use:  No Comment: rarely     Drug use: No    Sexual activity: Yes   Other Topics Concern    Not on file   Social History Narrative    Not on file     Social Determinants of Health     Financial Resource Strain:     Difficulty of Paying Living Expenses: Not on file   Food Insecurity:     Worried About Running Out of Food in the Last Year: Not on file    Ronda of Food in the Last Year: Not on file   Transportation Needs:     Lack of Transportation (Medical): Not on file    Lack of Transportation (Non-Medical): Not on file   Physical Activity:     Days of Exercise per Week: Not on file    Minutes of Exercise per Session: Not on file   Stress:     Feeling of Stress : Not on file   Social Connections:     Frequency of Communication with Friends and Family: Not on file    Frequency of Social Gatherings with Friends and Family: Not on file    Attends [employer] Services: Not on file    Active Member of [address] [place] [address] [employer] or Organizations: Not on file    Attends Club or Organization Meetings: Not on file    Marital Status: Not on file   Intimate Partner Violence:     Fear of Current or Ex-Partner: Not on file    Emotionally Abused: Not on file    Physically Abused: Not on file    Sexually Abused: Not on file   Housing Stability:     Unable to Pay for Housing in the Last Year: Not on file    Number of [place] in the Last Year: Not on file    Unstable Housing in the Last Year: Not on file       DRUG/FOOD ALLERGIES: Lisinopril, Other, Toujeo solostar [insulin glargine], Viagra [sildenafil citrate], and Victoza [liraglutide]    CURRENT MEDICATIONS  Prior to Admission medications    Medication Sig Start Date End Date Taking?  Authorizing Provider   insulin 70-30 (NOVOLIN 70/30 RELION) (70-30) 100 UNIT per ML injection vial INJECT 25 UNITS SUBCUTANEOUSLY IN THE MORNING AND 10 TO 15 NIGHTLY 12/30/21  Yes Harry Post MD   losartan (COZAAR) 25 MG tablet Take 1 tablet by mouth 2 times daily 7/29/21  Yes Harry Post MD Lancets MISC 1 each by Other route 3 times daily Dx 250.02 6/21/21  Yes Joe Augustin MD   blood glucose monitor strips 1 strip by Other route 3 times daily (before meals) Freestyle Strips per patient. Dx: E11.65 6/21/21  Yes Joe Augustin MD   Blood Glucose Monitoring Suppl (FREESTYLE FREEDOM LITE) w/Device KIT 1 kit by Does not apply route daily 4/30/21  Yes Joe Augustin MD   albuterol (PROVENTIL) (2.5 MG/3ML) 0.083% nebulizer solution Take 3 mLs by nebulization every 4 hours as needed for Wheezing or Shortness of Breath 3/11/21  Yes Joe Augustin MD   Alcohol Swabs (ALCOHOL PADS) 70 % PADS 1 each by Does not apply route 2 times daily 3/11/21  Yes Joe Augustin MD   acarbose (PRECOSE) 100 MG tablet TAKE 1 TABLET BY MOUTH THREE TIMES DAILY WITH MEALS 3/9/21  Yes Joe Augustin MD   atorvastatin (LIPITOR) 40 MG tablet TAKE 1 TABLET BY MOUTH ONE TIME A DAY 3/9/21  Yes Joe Augustin MD   etodolac (LODINE) 300 MG capsule Take 1 capsule by mouth every 8 hours 3/9/21  Yes Joe Augustin MD   famotidine (PEPCID) 20 MG tablet Take 1 tablet by mouth 2 times daily 3/9/21  Yes Joe Augustin MD   Insulin Syringe-Needle U-100 (INSULIN SYRINGE .5CC/31GX5/16\") 31G X 5/16\" 0.5 ML MISC 1 each by Does not apply route 4 times daily 3/9/21  Yes Joe Augustin MD   metFORMIN (GLUCOPHAGE-XR) 750 MG extended release tablet TAKE 2 TABLETS BY MOUTH ONCE DAILY WITH BREAKFAST 3/9/21  Yes Joe Augustin MD   tadalafil (CIALIS) 5 MG tablet Take 1 tablet by mouth daily 3/9/21  Yes Joe Augustin MD   magnesium oxide (MAG-OX) 400 (241.3 Mg) MG TABS tablet Take 1 tablet by mouth daily 3/9/21  Yes Joe Augustin MD   ciclopirox (PENLAC) 8 % solution Apply topically nightly.  3/9/21  Yes Joe Augustin MD   fluticasone (FLONASE) 50 MCG/ACT nasal spray USE 1 SPRAY(S) IN EACH NOSTRIL ONCE DAILY 11/16/20  Yes Joe Augustin MD   UNABLE TO FIND Hemoglobin A1C home monitor device 5/11/20  Yes Joe Augustin MD   Multiple Vitamins-Minerals (THERAPEUTIC MULTIVITAMIN-MINERALS) tablet Take 1 tablet by mouth daily   Yes Historical Provider, MD   albuterol sulfate (PROAIR RESPICLICK) 499 (90 BASE) MCG/ACT aerosol powder inhalation Inhale 1-2 puffs into the lungs every 4 hours as needed for Wheezing or Shortness of Breath 1/28/16  Yes Vitaly Mora MD   aspirin 81 MG chewable tablet Take 81 mg by mouth daily. OTC    Yes Historical Provider, MD   fish oil-omega-3 fatty acids 1000 MG capsule Take 2 g by mouth daily. OTC   Yes Historical Provider, MD   pantoprazole (PROTONIX) 20 MG tablet Take 1 tablet by mouth every morning (before breakfast)  Patient not taking: Reported on 6/24/2021 3/9/21   Vitaly Mora MD       REVIEW OF SYSTEMS  The following systems were reviewed and revealed the following in addition to any already discussed in the HPI:    Review of Systems   Constitutional: Negative for fatigue and fever. HENT: Positive for hearing loss. Negative for congestion, ear pain, postnasal drip, rhinorrhea and sneezing. Eyes: Negative for pain and visual disturbance. Respiratory: Negative for cough and shortness of breath. Cardiovascular: Negative for chest pain. Gastrointestinal: Negative for nausea and vomiting. Endocrine: Negative. Genitourinary: Negative. Musculoskeletal: Negative for neck pain and neck stiffness. Skin: Negative for rash. Neurological: Negative for dizziness and headaches.           PHYSICAL EXAM  BP (!) 168/77 (Site: Left Upper Arm, Position: Sitting, Cuff Size: Medium Adult)   Pulse 72   Temp 97.2 °F (36.2 °C) (Infrared)   Ht 5' 7\" (1.702 m)   Wt 210 lb (95.3 kg)   BMI 32.89 kg/m²     GENERAL: No Acute Distress, Alert and Oriented, no hoarseness  EYES: EOMI, Anti-icteric  NOSE: No epistaxis, nasal mucosa within normal limits, no purulent drainage  EARS: Normal external canal appearance, EAC patent with cerumen bilaterally  FACE: 1/6 House-Brackmann Scale, symmetric, sensation equal bilaterally  ORAL CAVITY: No masses or lesions palpated, uvula is midline, moist mucous membranes,   NECK: Normal range of motion, no thyromegaly, trachea is midline, no lymphadenopathy, no neck masses, no crepitus  CHEST: Normal respiratory effort, no retractions, breathing comfortably  SKIN: No rashes, normal appearing skin, no evidence of skin lesions/tumors    RADIOLOGY  Summary of findings:      PROCEDURE     Otomicroscopy and Cerumen Removal    An operating microscope was utilized to visualize the external auditory canals using a speculum. The external auditory canals where occluded with cerumen bilaterally. The cerumen was removed with instrumentation under microscopic evaluation. The bilateral tympanic membranes and ossicles are intact. No fluid visualized in the bilateral middle ears. ASSESSMENT/PLAN  Sada Cameron is a very pleasant 66 y.o. male with     1. Bilateral impacted cerumen  Cerumen was removed bilaterally microscopic visualization.  - MI REMOVAL IMPACTED CERUMEN INSTRUMENTATION UNILAT    2. Sensorineural hearing loss (SNHL) of both ears  He does have a bilateral sensorineural high-frequency hearing loss. He is a good candidate for hearing aids but is not interested this point time. He will return in 1 year for repeat audiogram or sooner if he needs his ears cleaned. Medical Decision Making:   The following items were considered in medical decision making:  Independent review of images  Review / order clinical lab tests  Review / order radiology tests  Decision to obtain old records

## 2022-01-11 DIAGNOSIS — E78.5 HYPERLIPIDEMIA, UNSPECIFIED HYPERLIPIDEMIA TYPE: ICD-10-CM

## 2022-01-11 DIAGNOSIS — K21.9 GASTROESOPHAGEAL REFLUX DISEASE WITHOUT ESOPHAGITIS: ICD-10-CM

## 2022-01-11 RX ORDER — FAMOTIDINE 20 MG/1
TABLET, FILM COATED ORAL
Qty: 60 TABLET | Refills: 0 | Status: SHIPPED | OUTPATIENT
Start: 2022-01-11 | End: 2022-03-25 | Stop reason: SDUPTHER

## 2022-01-11 RX ORDER — METFORMIN HYDROCHLORIDE 750 MG/1
TABLET, EXTENDED RELEASE ORAL
Qty: 180 TABLET | Refills: 0 | Status: SHIPPED | OUTPATIENT
Start: 2022-01-11 | End: 2022-03-25 | Stop reason: SDUPTHER

## 2022-01-11 RX ORDER — ATORVASTATIN CALCIUM 40 MG/1
TABLET, FILM COATED ORAL
Qty: 90 TABLET | Refills: 0 | Status: SHIPPED | OUTPATIENT
Start: 2022-01-11 | End: 2022-03-25 | Stop reason: SDUPTHER

## 2022-01-11 NOTE — TELEPHONE ENCOUNTER
.  Refill Request     Last Seen: Last Seen Department: 6/24/2021  Last Seen by PCP: 6/24/2021    Last Written: 3-9-21 with 3 refills     Next Appointment:   Future Appointments   Date Time Provider Som Lucas   2/7/2022  3:00 PM MD HANDY Sosa  [place] [facility]   7/11/2022 10:00 AM DO JIN [facility]       Future appointment scheduled      Requested Prescriptions     Pending Prescriptions Disp Refills    metFORMIN (GLUCOPHAGE-XR) 750 MG extended release tablet [Pharmacy Med Name: metFORMIN HCl  MG Oral Tablet Extended Release 24 Hour] 180 tablet 0     Sig: TAKE 2 TABLETS BY MOUTH ONCE DAILY WITH BREAKFAST    famotidine (PEPCID) 20 MG tablet [Pharmacy Med Name: Famotidine 20 MG Oral Tablet] 60 tablet 0     Sig: Take 1 tablet by mouth twice daily    atorvastatin (LIPITOR) 40 MG tablet [Pharmacy Med Name: Atorvastatin Calcium 40 MG Oral Tablet] 90 tablet 0     Sig: Take 1 tablet by mouth once daily

## 2022-02-07 ENCOUNTER — OFFICE VISIT (OUTPATIENT)
Dept: FAMILY MEDICINE CLINIC | Age: 72
End: 2022-02-07
Payer: MEDICARE

## 2022-02-07 VITALS
SYSTOLIC BLOOD PRESSURE: 182 MMHG | OXYGEN SATURATION: 99 % | HEIGHT: 66 IN | HEART RATE: 69 BPM | TEMPERATURE: 97.6 F | DIASTOLIC BLOOD PRESSURE: 74 MMHG | BODY MASS INDEX: 35.03 KG/M2 | WEIGHT: 218 LBS

## 2022-02-07 DIAGNOSIS — M54.50 CHRONIC MIDLINE LOW BACK PAIN, UNSPECIFIED WHETHER SCIATICA PRESENT: ICD-10-CM

## 2022-02-07 DIAGNOSIS — R97.20 ELEVATED PSA: ICD-10-CM

## 2022-02-07 DIAGNOSIS — E66.01 SEVERE OBESITY (BMI 35.0-39.9) WITH COMORBIDITY (HCC): ICD-10-CM

## 2022-02-07 DIAGNOSIS — G89.29 CHRONIC MIDLINE LOW BACK PAIN, UNSPECIFIED WHETHER SCIATICA PRESENT: ICD-10-CM

## 2022-02-07 DIAGNOSIS — Z12.11 COLON CANCER SCREENING: ICD-10-CM

## 2022-02-07 DIAGNOSIS — R05.3 CHRONIC COUGH: ICD-10-CM

## 2022-02-07 DIAGNOSIS — E11.649 DIABETIC HYPOGLYCEMIA (HCC): ICD-10-CM

## 2022-02-07 LAB — HBA1C MFR BLD: 8.2 %

## 2022-02-07 PROCEDURE — 4040F PNEUMOC VAC/ADMIN/RCVD: CPT | Performed by: FAMILY MEDICINE

## 2022-02-07 PROCEDURE — 83036 HEMOGLOBIN GLYCOSYLATED A1C: CPT | Performed by: FAMILY MEDICINE

## 2022-02-07 PROCEDURE — G8427 DOCREV CUR MEDS BY ELIG CLIN: HCPCS | Performed by: FAMILY MEDICINE

## 2022-02-07 PROCEDURE — 1036F TOBACCO NON-USER: CPT | Performed by: FAMILY MEDICINE

## 2022-02-07 PROCEDURE — G8417 CALC BMI ABV UP PARAM F/U: HCPCS | Performed by: FAMILY MEDICINE

## 2022-02-07 PROCEDURE — 1123F ACP DISCUSS/DSCN MKR DOCD: CPT | Performed by: FAMILY MEDICINE

## 2022-02-07 PROCEDURE — G8484 FLU IMMUNIZE NO ADMIN: HCPCS | Performed by: FAMILY MEDICINE

## 2022-02-07 PROCEDURE — 3052F HG A1C>EQUAL 8.0%<EQUAL 9.0%: CPT | Performed by: FAMILY MEDICINE

## 2022-02-07 PROCEDURE — 99214 OFFICE O/P EST MOD 30 MIN: CPT | Performed by: FAMILY MEDICINE

## 2022-02-07 PROCEDURE — 3017F COLORECTAL CA SCREEN DOC REV: CPT | Performed by: FAMILY MEDICINE

## 2022-02-07 PROCEDURE — 2022F DILAT RTA XM EVC RTNOPTHY: CPT | Performed by: FAMILY MEDICINE

## 2022-02-07 RX ORDER — MONTELUKAST SODIUM 10 MG/1
10 TABLET ORAL NIGHTLY
Qty: 30 TABLET | Refills: 3 | Status: SHIPPED | OUTPATIENT
Start: 2022-02-07

## 2022-02-07 SDOH — ECONOMIC STABILITY: HOUSING INSECURITY: IN THE LAST 12 MONTHS, HOW MANY PLACES HAVE YOU LIVED?: 1

## 2022-02-07 SDOH — ECONOMIC STABILITY: TRANSPORTATION INSECURITY
IN THE PAST 12 MONTHS, HAS LACK OF TRANSPORTATION KEPT YOU FROM MEETINGS, WORK, OR FROM GETTING THINGS NEEDED FOR DAILY LIVING?: NO

## 2022-02-07 SDOH — ECONOMIC STABILITY: FOOD INSECURITY: WITHIN THE PAST 12 MONTHS, YOU WORRIED THAT YOUR FOOD WOULD RUN OUT BEFORE YOU GOT MONEY TO BUY MORE.: NEVER TRUE

## 2022-02-07 SDOH — ECONOMIC STABILITY: FOOD INSECURITY: WITHIN THE PAST 12 MONTHS, THE FOOD YOU BOUGHT JUST DIDN'T LAST AND YOU DIDN'T HAVE MONEY TO GET MORE.: NEVER TRUE

## 2022-02-07 SDOH — ECONOMIC STABILITY: HOUSING INSECURITY
IN THE LAST 12 MONTHS, WAS THERE A TIME WHEN YOU DID NOT HAVE A STEADY PLACE TO SLEEP OR SLEPT IN A SHELTER (INCLUDING NOW)?: NO

## 2022-02-07 SDOH — ECONOMIC STABILITY: INCOME INSECURITY: IN THE LAST 12 MONTHS, WAS THERE A TIME WHEN YOU WERE NOT ABLE TO PAY THE MORTGAGE OR RENT ON TIME?: NO

## 2022-02-07 SDOH — ECONOMIC STABILITY: TRANSPORTATION INSECURITY
IN THE PAST 12 MONTHS, HAS THE LACK OF TRANSPORTATION KEPT YOU FROM MEDICAL APPOINTMENTS OR FROM GETTING MEDICATIONS?: NO

## 2022-02-07 ASSESSMENT — SOCIAL DETERMINANTS OF HEALTH (SDOH): HOW HARD IS IT FOR YOU TO PAY FOR THE VERY BASICS LIKE FOOD, HOUSING, MEDICAL CARE, AND HEATING?: NOT HARD AT ALL

## 2022-02-07 NOTE — LETTER
[address]  [address] [place] [id number]  Phone: [phone]  Fax: [phone]    Madelyn Paul MD         February 7, 2022     Patient: Tiff Herrera   YOB: 1950   Date of Visit: 2/7/2022       To Whom It May Concern:     It is my medical opinion that Karley Garnica requires a disability parking placard for the following reasons:  He has limited walking ability due to an orthopedic condition.   Duration of need: permanent     If you have any questions or concerns, please don't hesitate to call.     Sincerely,  Sonia Muller MD

## 2022-02-08 PROBLEM — E11.649 DIABETIC HYPOGLYCEMIA (HCC): Status: ACTIVE | Noted: 2022-02-08

## 2022-02-08 PROBLEM — E66.01 SEVERE OBESITY (BMI 35.0-39.9) WITH COMORBIDITY (HCC): Status: ACTIVE | Noted: 2022-02-08

## 2022-02-08 PROBLEM — R05.3 CHRONIC COUGH: Status: ACTIVE | Noted: 2022-02-08

## 2022-02-08 PROBLEM — R97.20 ELEVATED PSA: Status: ACTIVE | Noted: 2022-02-08

## 2022-02-08 ASSESSMENT — ENCOUNTER SYMPTOMS
BACK PAIN: 1
COUGH: 1

## 2022-02-08 NOTE — PROGRESS NOTES
Ferdinand Goldstein (:  1950) is a 66 y.o. male,Established patient, here for evaluation of the following chief complaint(s):  Diabetes and Cough         ASSESSMENT/PLAN:  1. Uncontrolled type 2 diabetes mellitus with retinopathy, with long-term current use of insulin ([facility])  -     POCT glycosylated hemoglobin (Hb A1C)  -      DIABETES FOOT EXAM  -     Fecal DNA Colorectal cancer screening (Colog[facility]ard)  -     CBC Auto Differential; Future  -     Comprehensive Metabolic Panel; Future  -     TSH with Reflex; Future  -     Lipid Panel; Future  -     Vitamin B12; Future  Improving from last visit, but with hypogylcemia. Discussed strategy to avoid hypoglycemia and how to treat it should it occur. Discussed that only 15 gm of carbohydrate is needed to increase glucose, and that having 60+ gm of simple carbs will increase the sugars too much, so avoid consuming excess carbs when or if hypogylcemia occurs. Eating on a regular schedule will help avoid this as well. Eating protein and fiber helps stabilize blood sugars as well. 2. Severe obesity (BMI 35.0-39. 9) with comorbidity ([address]  Focused mainly on diet today. Avoid sugary beverages and foods that contain high levels of both simple carbohydrates and fats. 3. Chronic midline low back pain, unspecified whether sciatica present  -     Handicap Placard MISC; Starting Mon 2022, Disp-1 each, R-0, Print  4. Diabetic hypoglycemia ([address]  Likely needs to back off evening dosing of insulin by about 2 units. 5. Elevated PSA  -     PSA, TOTAL AND FREE; Future  6. Chronic cough  Trial of Singulair. Call or return to clinic prn if these symptoms worsen or fail to improve as anticipated. Return in about 3 months (around 2022) for Diabetes. Subjective   SUBJECTIVE/OBJECTIVE:  Chief Complaint   Patient presents with    Diabetes    Cough    Back Pain     handicap placard renewal       Diabetes  He presents for his follow-up diabetic visit.  He has type 2 diabetes mellitus. His disease course has been improving. Hypoglycemia symptoms include confusion, dizziness, pallor and sweats. Hypoglycemia complications include required assistance. Symptoms are improving. Diabetic complications include retinopathy. Risk factors for coronary artery disease include diabetes mellitus, male sex, obesity and dyslipidemia. Current diabetic treatment includes insulin injections. His weight is stable. An ACE inhibitor/angiotensin II receptor blocker is being taken. Eye exam is current. Cough  This is a chronic problem. The current episode started more than 1 year ago. The problem has been unchanged. The cough is non-productive. Associated symptoms include sweats. The symptoms are aggravated by lying down. Back Pain  This is a chronic problem. The current episode started more than 1 year ago. The problem is unchanged. He would like a renewal of his handicap placard. When hypoglycemic, he will sometimes drink a Mcin[employer]re[employer] Coke and eat fries. In the mornings will have juice, sometimes donut. He has chronically very elevated PSA. He has been hesitant to get biopsy done. [facility], but [facility]. He reports hasn't been seen for this in about a year. He would like PSA rechecked. Review of Systems   Respiratory: Positive for cough. Musculoskeletal: Positive for back pain. Skin: Positive for pallor. Neurological: Positive for dizziness. Psychiatric/Behavioral: Positive for confusion. Objective   Physical Exam  Vitals and nursing note reviewed. Constitutional:       General: He is not in acute distress. Appearance: He is well-developed. He is not diaphoretic. Cardiovascular:      Pulses:           Dorsalis pedis pulses are 2+ on the right side and 2+ on the left side. Posterior tibial pulses are 2+ on the right side and 2+ on the left side.    Pulmonary:      Effort: Pulmonary effort is normal. Musculoskeletal:      Right foot: Normal range of motion. No deformity. Left foot: Normal range of motion. No deformity. Feet:      Right foot:      Protective Sensation: 10 sites tested. 10 sites sensed. Skin integrity: No ulcer, blister, skin breakdown, erythema, warmth, callus or dry skin. Toenail Condition: Fungal disease present. Left foot:      Protective Sensation: 10 sites tested. 8 sites sensed. Skin integrity: No ulcer, blister, skin breakdown, erythema, warmth, callus or dry skin. Toenail Condition: Fungal disease present. Neurological:      Mental Status: He is alert. Sensory: No sensory deficit. Deep Tendon Reflexes:      Reflex Scores:       Achilles reflexes are 2+ on the right side and 2+ on the left side. Comments: Normal proprioception of big toes bilaterally          Results for POC orders placed in visit on 02/07/22   POCT glycosylated hemoglobin (Hb A1C)   Result Value Ref Range    Hemoglobin A1C 8.2 %           An electronic signature was used to authenticate this note.     --Tea Espinal MD

## 2022-03-10 ENCOUNTER — TELEPHONE (OUTPATIENT)
Dept: FAMILY MEDICINE CLINIC | Age: 72
End: 2022-03-10

## 2022-03-10 DIAGNOSIS — R97.20 ELEVATED PSA: ICD-10-CM

## 2022-03-10 LAB
A/G RATIO: 2.3 (ref 1.1–2.2)
ALBUMIN SERPL-MCNC: 4.2 G/DL (ref 3.4–5)
ALP BLD-CCNC: 89 U/L (ref 40–129)
ALT SERPL-CCNC: 25 U/L (ref 10–40)
ANION GAP SERPL CALCULATED.3IONS-SCNC: 13 MMOL/L (ref 3–16)
AST SERPL-CCNC: 28 U/L (ref 15–37)
BASOPHILS ABSOLUTE: 0 K/UL (ref 0–0.2)
BASOPHILS RELATIVE PERCENT: 1 %
BILIRUB SERPL-MCNC: 0.5 MG/DL (ref 0–1)
BUN BLDV-MCNC: 12 MG/DL (ref 7–20)
CALCIUM SERPL-MCNC: 9.2 MG/DL (ref 8.3–10.6)
CHLORIDE BLD-SCNC: 103 MMOL/L (ref 99–110)
CHOLESTEROL, TOTAL: 152 MG/DL (ref 0–199)
CO2: 27 MMOL/L (ref 21–32)
CREAT SERPL-MCNC: 1 MG/DL (ref 0.8–1.3)
EOSINOPHILS ABSOLUTE: 0.3 K/UL (ref 0–0.6)
EOSINOPHILS RELATIVE PERCENT: 5.5 %
GFR AFRICAN AMERICAN: >60
GFR NON-AFRICAN AMERICAN: >60
GLUCOSE BLD-MCNC: 90 MG/DL (ref 70–99)
HCT VFR BLD CALC: 37.1 % (ref 40.5–52.5)
HDLC SERPL-MCNC: 62 MG/DL (ref 40–60)
HEMOGLOBIN: 12.4 G/DL (ref 13.5–17.5)
LDL CHOLESTEROL CALCULATED: 74 MG/DL
LYMPHOCYTES ABSOLUTE: 1.3 K/UL (ref 1–5.1)
LYMPHOCYTES RELATIVE PERCENT: 27.1 %
MCH RBC QN AUTO: 28.9 PG (ref 26–34)
MCHC RBC AUTO-ENTMCNC: 33.4 G/DL (ref 31–36)
MCV RBC AUTO: 86.6 FL (ref 80–100)
MONOCYTES ABSOLUTE: 0.4 K/UL (ref 0–1.3)
MONOCYTES RELATIVE PERCENT: 7.7 %
NEUTROPHILS ABSOLUTE: 2.7 K/UL (ref 1.7–7.7)
NEUTROPHILS RELATIVE PERCENT: 58.7 %
PDW BLD-RTO: 13.6 % (ref 12.4–15.4)
PLATELET # BLD: 242 K/UL (ref 135–450)
PMV BLD AUTO: 8.5 FL (ref 5–10.5)
POTASSIUM SERPL-SCNC: 4.5 MMOL/L (ref 3.5–5.1)
RBC # BLD: 4.28 M/UL (ref 4.2–5.9)
SODIUM BLD-SCNC: 143 MMOL/L (ref 136–145)
TOTAL PROTEIN: 6 G/DL (ref 6.4–8.2)
TRIGL SERPL-MCNC: 78 MG/DL (ref 0–150)
TSH REFLEX: 1.92 UIU/ML (ref 0.27–4.2)
VITAMIN B-12: 684 PG/ML (ref 211–911)
VLDLC SERPL CALC-MCNC: 16 MG/DL
WBC # BLD: 4.6 K/UL (ref 4–11)

## 2022-03-10 NOTE — TELEPHONE ENCOUNTER
Spoke with [employer] per the order the diagnosis code was changed from type 2 DM to the colon cancer screening. Verbal order was given and we will receive a fax regarding this.

## 2022-03-10 NOTE — TELEPHONE ENCOUNTER
Called [employer] back spoke with, Saud Fall relayed the message that the diagnosis codes have been changed

## 2022-03-10 NOTE — TELEPHONE ENCOUNTER
[employer] calling to advise the diagnosis code for the stool test might not be a covered diagnosis. They are asking if you would like to change it to screening for colon cancer, which is usually a covered diagnosis.

## 2022-03-16 LAB
PROSTATE SPECIFIC ANTIGEN FREE: 2.8 UG/L
PROSTATE SPECIFIC ANTIGEN PERCENT FREE: 14.1 %
PROSTATE SPECIFIC ANTIGEN: 19.9 UG/L (ref 0–4)

## 2022-03-25 DIAGNOSIS — K21.9 GASTROESOPHAGEAL REFLUX DISEASE WITHOUT ESOPHAGITIS: ICD-10-CM

## 2022-03-25 DIAGNOSIS — E78.5 HYPERLIPIDEMIA, UNSPECIFIED HYPERLIPIDEMIA TYPE: ICD-10-CM

## 2022-03-25 PROCEDURE — 3052F HG A1C>EQUAL 8.0%<EQUAL 9.0%: CPT | Performed by: FAMILY MEDICINE

## 2022-03-25 RX ORDER — FAMOTIDINE 20 MG/1
TABLET, FILM COATED ORAL
Qty: 60 TABLET | Refills: 0 | Status: SHIPPED | OUTPATIENT
Start: 2022-03-25 | End: 2022-05-10 | Stop reason: SDUPTHER

## 2022-03-25 RX ORDER — METFORMIN HYDROCHLORIDE 750 MG/1
TABLET, EXTENDED RELEASE ORAL
Qty: 180 TABLET | Refills: 0 | Status: SHIPPED | OUTPATIENT
Start: 2022-03-25 | End: 2022-05-10 | Stop reason: SDUPTHER

## 2022-03-25 RX ORDER — ATORVASTATIN CALCIUM 40 MG/1
TABLET, FILM COATED ORAL
Qty: 90 TABLET | Refills: 0 | Status: SHIPPED | OUTPATIENT
Start: 2022-03-25 | End: 2022-05-10 | Stop reason: SDUPTHER

## 2022-03-25 RX ORDER — LOSARTAN POTASSIUM 25 MG/1
25 TABLET ORAL 2 TIMES DAILY
Qty: 180 TABLET | Refills: 1 | Status: SHIPPED | OUTPATIENT
Start: 2022-03-25 | End: 2022-05-10 | Stop reason: SDUPTHER

## 2022-03-25 NOTE — TELEPHONE ENCOUNTER
Refill Request     Last Seen: Last Seen Department: 2/7/2022    Last Seen by PCP: 2/7/2022    Last Written: atorvastatin: 1/11/22 90 tablet 0 refills                        Losartan: 7/29/21 180 tablet 1 refill                       Metformin: 1/11/22 180 tablet 0 refills                       Pepcid: 1/11/22 60 tablet 0 refills    Next Appointment: 5/10/22  Future Appointments   Date Time Provider Som Lucas   5/10/2022  9:00 AM MD HANDY Robertson  [place] [facility]   7/11/2022 10:00 AM DO JIN Pimentel [facility]   9/8/2022  2:45 PM MD HANDY Robertson  [place] [facility]       Future appointment scheduled      Requested Prescriptions     Pending Prescriptions Disp Refills    atorvastatin (LIPITOR) 40 MG tablet 90 tablet 0     Sig: Take 1 tablet by mouth once daily    losartan (COZAAR) 25 MG tablet 180 tablet 1     Sig: Take 1 tablet by mouth 2 times daily    metFORMIN (GLUCOPHAGE-XR) 750 MG extended release tablet 180 tablet 0    famotidine (PEPCID) 20 MG tablet 60 tablet 0

## 2022-03-29 ENCOUNTER — PATIENT MESSAGE (OUTPATIENT)
Dept: FAMILY MEDICINE CLINIC | Age: 72
End: 2022-03-29

## 2022-03-30 ENCOUNTER — NURSE TRIAGE (OUTPATIENT)
Dept: OTHER | Facility: CLINIC | Age: 72
End: 2022-03-30

## 2022-03-30 RX ORDER — ACARBOSE 100 MG/1
TABLET ORAL
Qty: 90 TABLET | Refills: 1 | Status: SHIPPED | OUTPATIENT
Start: 2022-03-30 | End: 2022-04-11

## 2022-03-30 RX ORDER — TADALAFIL 5 MG/1
5 TABLET ORAL DAILY
Qty: 90 TABLET | Refills: 3 | Status: SHIPPED | OUTPATIENT
Start: 2022-03-30

## 2022-03-30 NOTE — TELEPHONE ENCOUNTER
From: Clifton Collado  To: Dr. My Cruz: 3/29/2022 2:57 PM EDT  Subject: Script and Urologist    [facility]'m going to [facility] an appt [facility] for [facility] Not sure any Doctors can compare with Dr. Dustin Pablo (retired). Not sure why there is such a problem getting scripts to [facility] here in [place] [address]. [place] Did received all but the Acarbose (90 day) if you would please get that one to [facility] and also a refill on the Tadalafil 5mg (90 day). Also [facility] . The strips, fluticasone, Novolin and syringes remain at [facility] here in [facility] [place]. Looks like the syringes will need a refill. I can have them call for it next week.     Thanks Isela Gonzalez

## 2022-03-30 NOTE — TELEPHONE ENCOUNTER
Received call from Yadiel at [facility] with Red Flag Complaint. Subjective: Caller states \"A month ago went to PCP office, had a bumps on back of calf looks like bug bites but forgot to mention to MD\"     Current Symptoms: Back of left calf bug bites- was one or two pencil tips size bug bites looking, now looks like an oblong bump now the size of a of a betsy, redden bite area but surrounding area is not, swollen within an inch around area, burning itchy constantly, denies drainage, calf muscle is more tight, he is a -doesn't remember being bitten but similar to bee sting from before except not going away, Hx of diabetes    Onset: 6 weeks ago; slow, worsening    Associated Symptoms: NA    Pain Severity: Denies Temperature: Denies     What has been tried: Neosporin, and benadryl-not improving    LMP: NA Pregnant: NA    Recommended disposition: See in Office Today or Tomorrow, advised caller if unable to get an appointment in the suggested time frame to go to an [facility], caller agreeable. Care advice provided, patient verbalizes understanding; denies any other questions or concerns; instructed to call back for any new or worsening symptoms. Patient/Caller agrees with recommended disposition; writer provided warm transfer to Juanpablo Ibarra at [facility] for appointment scheduling     Attention Provider: Thank you for allowing me to participate in the care of your patient. The patient was connected to triage in response to information provided to the ECC/PSC. Please do not respond through this encounter as the response is not directed to a shared pool.       Reason for Disposition   Patient wants to be seen    Protocols used: INSECT BITE-ADULT-[place]

## 2022-03-31 LAB — NONINV COLON CA DNA+OCC BLD SCRN STL QL: NORMAL

## 2022-04-01 ENCOUNTER — OFFICE VISIT (OUTPATIENT)
Dept: FAMILY MEDICINE CLINIC | Age: 72
End: 2022-04-01
Payer: MEDICARE

## 2022-04-01 VITALS
WEIGHT: 212.6 LBS | BODY MASS INDEX: 34.31 KG/M2 | SYSTOLIC BLOOD PRESSURE: 150 MMHG | OXYGEN SATURATION: 96 % | DIASTOLIC BLOOD PRESSURE: 68 MMHG | HEART RATE: 68 BPM

## 2022-04-01 DIAGNOSIS — B35.4 TINEA CORPORIS: Primary | ICD-10-CM

## 2022-04-01 PROCEDURE — 4040F PNEUMOC VAC/ADMIN/RCVD: CPT | Performed by: NURSE PRACTITIONER

## 2022-04-01 PROCEDURE — 1036F TOBACCO NON-USER: CPT | Performed by: NURSE PRACTITIONER

## 2022-04-01 PROCEDURE — G8417 CALC BMI ABV UP PARAM F/U: HCPCS | Performed by: NURSE PRACTITIONER

## 2022-04-01 PROCEDURE — 1123F ACP DISCUSS/DSCN MKR DOCD: CPT | Performed by: NURSE PRACTITIONER

## 2022-04-01 PROCEDURE — 3017F COLORECTAL CA SCREEN DOC REV: CPT | Performed by: NURSE PRACTITIONER

## 2022-04-01 PROCEDURE — 99213 OFFICE O/P EST LOW 20 MIN: CPT | Performed by: NURSE PRACTITIONER

## 2022-04-01 PROCEDURE — G8427 DOCREV CUR MEDS BY ELIG CLIN: HCPCS | Performed by: NURSE PRACTITIONER

## 2022-04-01 RX ORDER — CLOTRIMAZOLE 1 %
CREAM (GRAM) TOPICAL
Qty: 1 EACH | Refills: 0 | Status: SHIPPED | OUTPATIENT
Start: 2022-04-01 | End: 2022-04-27

## 2022-04-01 ASSESSMENT — PATIENT HEALTH QUESTIONNAIRE - PHQ9
1. LITTLE INTEREST OR PLEASURE IN DOING THINGS: 0
SUM OF ALL RESPONSES TO PHQ QUESTIONS 1-9: 0
SUM OF ALL RESPONSES TO PHQ QUESTIONS 1-9: 0
2. FEELING DOWN, DEPRESSED OR HOPELESS: 0
SUM OF ALL RESPONSES TO PHQ QUESTIONS 1-9: 0
SUM OF ALL RESPONSES TO PHQ9 QUESTIONS 1 & 2: 0
SUM OF ALL RESPONSES TO PHQ QUESTIONS 1-9: 0

## 2022-04-01 ASSESSMENT — ENCOUNTER SYMPTOMS: COLOR CHANGE: 0

## 2022-04-01 NOTE — PATIENT INSTRUCTIONS
Patient Education        Ringworm: Care Instructions  Your Care Instructions  Ringworm is a fungus infection of the skin. It is not caused by a worm. Ringworm causes a round, scaly rash that may crack and itch. The rash can spread over a wide area. One type of fungus that causes ringworm is often found in locker rooms and swimming pools. It grows well in warm, moist areas of the skin, such as in skin folds. You can get ringworm by sharing towels, clothing,and sports equipment. You can also get it by touching someone who has ringworm. Ringworm is treated with cream that kills the fungus. If the rash is widespread, you may need pills to get rid of it. Ringworm often comes back after treatment. If the rash becomes infected with bacteria, you may needantibiotics. Follow-up care is a key part of your treatment and safety. Be sure to make and go to all appointments, and call your doctor if you are having problems. It's also a good idea to know your test results and keep alist of the medicines you take. How can you care for yourself at home?  Take your medicines exactly as prescribed. Call your doctor if you have any problems with your medicine.  Wash the rash with soap and water, remove flaky skin, and dry thoroughly.  Try an over-the-counter antifungal cream. Spread the cream beyond the edge or border of the rash. Follow the directions on the package. Do not stop using the medicine just because your skin clears up. You will probably need to continue treatment for 2 to 4 weeks or longer.  To avoid spreading it, wash your hands well after treating or touching the rash.  To keep from getting another infection:  ? Do not go barefoot in public places such as gyms or locker rooms. Avoid sharing towels and clothes. Use flip-flops or some other type of shoe in the shower. ? Do not wear tight clothes or let your skin stay damp for long periods, such as by staying in a wet bathing suit or sweaty clothes.   When should you call for help? Call your doctor now or seek immediate medical care if:     You have signs of infection such as:  ? Pain, warmth, or swelling in your skin. ? Red streaks near a wound in the skin. ? Pus coming from the rash on your skin. ? A fever. Watch closely for changes in your health, and be sure to contact your doctor if:     Your ringworm does not improve after 2 weeks of treatment.      You do not get better as expected. Where can you learn more? Go to [email or web address].Pipeline. org and sign in to your [employer] account. Enter B172 in the [employer]ola[employer] box to learn more about \"Ringworm: Care Instructions. \"     If you do not have an account, please click on the \"Sign Up Now\" link. Current as of: November 15, 2021               Content Version: 13.2  © 2147-1527 Healthwise, Incorporated. Care instructions adapted under license by [facility] ([facility]). If you have questions about a medical condition or this instruction, always ask your healthcare professional. [address] any warranty or liability for your use of this information.

## 2022-04-01 NOTE — PROGRESS NOTES
2022     Tuan Cardoso (:  1950) is a 66 y.o. male, here for evaluation of the following medical concerns:    HPI  Pt c/o itchy rash to his left posterior calf area for the past 2 months. No known cause. Denies any pain. He has tried hydrocortisone cream and benadryl without relief. Stated that the rash became more red and itchy when he kept it covered with a Band-Aid. Review of Systems   Constitutional: Negative. Musculoskeletal: Negative. Skin: Positive for rash. Negative for color change, pallor and wound. Prior to Visit Medications    Medication Sig Taking? Authorizing Provider   clotrimazole (LOTRIMIN AF) 1 % cream Apply topically 2 times daily.  Yes MARLIN Ibarra CNP   acarbose (PRECOSE) 100 MG tablet TAKE 1 TABLET BY MOUTH THREE TIMES DAILY WITH MEALS Yes MARLIN Nixon   tadalafil (CIALIS) 5 MG tablet Take 1 tablet by mouth daily Yes MARLIN Nixon   atorvastatin (LIPITOR) 40 MG tablet Take 1 tablet by mouth once daily Yes MARLIN Greco CNP   losartan (COZAAR) 25 MG tablet Take 1 tablet by mouth 2 times daily Yes MARLIN Greco CNP   metFORMIN (GLUCOPHAGE-XR) 750 MG extended release tablet TAKE 2 TABLETS BY MOUTH ONCE DAILY WITH BREAKFAST Yes MARLIN Greco CNP   famotidine (PEPCID) 20 MG tablet Take 1 tablet by mouth twice daily Yes MARLIN Greco CNP   fluticasone (FLONASE) 50 MCG/ACT nasal spray Use 1 spray(s) in each nostril once daily Yes Shena Morris MD   insulin 70-30 (NOVOLIN 70/30 RELION) (70-30) 100 UNIT per ML injection vial INJECT 25 UNITS SUBCUTANEOUSLY IN THE MORNING AND 10 TO 15 NIGHTLY Yes Shena Morris MD   albuterol (PROVENTIL) (2.5 MG/3ML) 0.083% nebulizer solution Take 3 mLs by nebulization every 4 hours as needed for Wheezing or Shortness of Breath Yes Shena Morris MD   etodolac (LODINE) 300 MG capsule Take 1 capsule by mouth every 8 hours  Patient taking differently: Take 300 mg by mouth every 8 hours As needed Yes Lopez Millan MD   magnesium oxide (MAG-OX) 400 (241.3 Mg) MG TABS tablet Take 1 tablet by mouth daily Yes Lopez Millan MD   ciclopirox (PENLAC) 8 % solution Apply topically nightly. Yes Lopez Millan MD   Multiple Vitamins-Minerals (THERAPEUTIC MULTIVITAMIN-MINERALS) tablet Take 1 tablet by mouth daily Yes Historical Provider, MD   albuterol sulfate (PROAIR RESPICLICK) 175 (90 BASE) MCG/ACT aerosol powder inhalation Inhale 1-2 puffs into the lungs every 4 hours as needed for Wheezing or Shortness of Breath Yes Lopez Millan MD   aspirin 81 MG chewable tablet Take 81 mg by mouth daily. OTC  Yes Historical Provider, MD   fish oil-omega-3 fatty acids 1000 MG capsule Take 2 g by mouth daily. OTC Yes Historical Provider, MD   Handicap Placard MISC by Does not apply route  Lopez Millan MD   montelukast (SINGULAIR) 10 MG tablet Take 1 tablet by mouth nightly  Patient not taking: Reported on 4/1/2022  Lopez Millan MD   Lancets MISC 1 each by Other route 3 times daily Dx 250.02  Lopez Millan MD   blood glucose monitor strips 1 strip by Other route 3 times daily (before meals) Freestyle Strips per patient.  Dx: E11.65  Lopez iMllan MD   Blood Glucose Monitoring Suppl (FREESTYLE FREEDOM LITE) w/Device KIT 1 kit by Does not apply route daily  Lopez Millan MD   Alcohol Swabs (ALCOHOL PADS) 70 % PADS 1 each by Does not apply route 2 times daily  Lopez Millan MD   Insulin Syringe-Needle U-100 (INSULIN SYRINGE .5CC/31GX5/16\") 31G X 5/16\" 0.5 ML MISC 1 each by Does not apply route 4 times daily  Lopez Millan MD        Social History     Tobacco Use    Smoking status: Never Smoker    Smokeless tobacco: Never Used   Substance Use Topics    Alcohol use: No     Comment: rarely         Vitals:    04/01/22 0909   BP: (!) 150/68   Site: Left Upper Arm   Position: Sitting   Cuff Size: Large Adult   Pulse: 68   SpO2: 96%   Weight: 212 lb 9.6 oz (96.4 kg)     Estimated body mass index is 34.31 kg/m² as calculated from the following:    Height as of 2/7/22: 5' 6\" (1.676 m). Weight as of this encounter: 212 lb 9.6 oz (96.4 kg). Physical Exam  Vitals reviewed. Constitutional:       Appearance: Normal appearance. HENT:      Head: Normocephalic. Pulmonary:      Effort: Pulmonary effort is normal.   Musculoskeletal:         General: Normal range of motion. Skin:     General: Skin is warm and dry. Findings: Erythema and rash present. Rash is scaling. Comments: See picture. Neurological:      Mental Status: He is alert and oriented to person, place, and time. Psychiatric:         Mood and Affect: Mood normal.         Behavior: Behavior normal.         Thought Content: Thought content normal.         Judgment: Judgment normal.        Media Information             Document Information    Dermatology Image:  Dermatology Photo      04/01/2022 09:21   Attached To: Office Visit on 4/1/22 with MARLIN Goode [facility], APRN - CNP  [facility]         ASSESSMENT/PLAN:  1. Tinea corporis  See HPI. Pt tried OTC hydrocortisone cream without any improvement. Stated that the rash became more red and itchy when he kept it covered with a Band-Aid. Likely d/t fungal infection. Will treat with antifungal cream and advised the pt to f/u with his PCP as needed. - clotrimazole (LOTRIMIN AF) 1 % cream; Apply topically 2 times daily. Dispense: 1 each; Refill: 0      Return if symptoms worsen or fail to improve. An electronic signature was used to authenticate this note.     --MARLIN Goode CNP on 4/1/2022 at 9:33 AM

## 2022-04-05 NOTE — TELEPHONE ENCOUNTER
Refill Request     Last Seen: Last Seen Department: 4/1/2022  Last Seen by PCP: 2/7/2022    Last Written: 12/30/21 20 mL 2 refill     Next Appointment: 5/10/22     Future Appointments   Date Time Provider Som Szymanskii   5/10/2022  9:00 AM MD HANDY Gomez [facility]   7/11/2022 10:00 AM DO JIN Franks [facility]NT [facility]   9/8/2022  2:45 PM MD HANDY Gomez [facility]       Future appointment scheduled      Requested Prescriptions     Pending Prescriptions Disp Refills    Insulin Syringe-Needle U-100 (INSULIN SYRINGE .5CC/31GX5/16\") 31G X 5/16\" 0.5 ML MISC [Pharmacy Med Name: MM SYRINGE 0.5ML/31G MIS] 200 each 0     Sig: USE 1 SYRINGE SUBCUTANEOUSLY 4 TIMES DAILY

## 2022-04-06 RX ORDER — NAPROXEN SODIUM 220 MG
TABLET ORAL
Qty: 200 EACH | Refills: 0 | Status: SHIPPED | OUTPATIENT
Start: 2022-04-06

## 2022-04-11 ENCOUNTER — TELEPHONE (OUTPATIENT)
Dept: FAMILY MEDICINE CLINIC | Age: 72
End: 2022-04-11

## 2022-04-11 RX ORDER — ACARBOSE 100 MG/1
TABLET ORAL
Qty: 90 TABLET | Refills: 1 | Status: SHIPPED | OUTPATIENT
Start: 2022-04-11 | End: 2022-05-10 | Stop reason: SDUPTHER

## 2022-04-11 NOTE — TELEPHONE ENCOUNTER
Refill Request     Last Seen: Last Seen Department: 4/1/2022  Last Seen by PCP: Visit date not found    Last Written: 3/30/22 with 1 refill     Next Appointment:   Future Appointments   Date Time Provider Som Shayy   5/10/2022  9:00 AM MD HANDY Rhodes  [facility]   7/11/2022 10:00 AM DO JIN Hough [facility]""   9/8/2022  2:45 PM MD HANDY Rhodes  [facility]       Future appointment scheduled      Requested Prescriptions     Pending Prescriptions Disp Refills    acarbose (PRECOSE) 100 MG tablet [Pharmacy Med Name: ACARBOSE 100 MG TABLET] 90 tablet 1     Sig: TAKE ONE TABLET BY MOUTH THREE TIMES A DAY WITH A MEAL

## 2022-04-11 NOTE — TELEPHONE ENCOUNTER
Left message for pt to schedule nurse AWV- This can be done via telephone- please schedule as a VVAWV appt type.

## 2022-04-26 DIAGNOSIS — B35.4 TINEA CORPORIS: ICD-10-CM

## 2022-04-27 RX ORDER — CLOTRIMAZOLE 1 %
CREAM (GRAM) TOPICAL
Qty: 30 G | Refills: 1 | Status: SHIPPED | OUTPATIENT
Start: 2022-04-27

## 2022-04-27 NOTE — TELEPHONE ENCOUNTER
Refill Request     Last Seen: Last Seen Department: 4/1/2022  Last Seen by PCP: 4/1/2022    Last Written: 04/01/22 1 each       Next Appointment:   Future Appointments   Date Time Provider Som Szymanskii   5/10/2022  9:00 AM MD HANDY Morales  [facility]   7/11/2022 10:00 AM Adele Ba DO [facility]IN [facility]   [facility]2022  2[facility]45 [facility]       Future appointment scheduled      Requested Prescriptions     Pending Prescriptions Disp Refills    clotrimazole (LOTRIMIN) 1 % cream [Pharmacy Med Name: CLOTRIMAZOLE 1% TOPICAL CREAM] 30 g      Sig: APPLY TOPICALLY TWICE A DAY

## 2022-04-27 NOTE — TELEPHONE ENCOUNTER
I have refilled this but if the patient is still dealing with this rash at his next appt with Dr. Nemesio Henderson, please advise him to discuss with her.  TY

## 2022-05-03 NOTE — TELEPHONE ENCOUNTER
I do not see that pt has called back. He is scheduled with PCP on 5/10/22. Will add to appointment note.

## 2022-05-10 ENCOUNTER — OFFICE VISIT (OUTPATIENT)
Dept: FAMILY MEDICINE CLINIC | Age: 72
End: 2022-05-10
Payer: MEDICARE

## 2022-05-10 VITALS
DIASTOLIC BLOOD PRESSURE: 86 MMHG | BODY MASS INDEX: 33.54 KG/M2 | WEIGHT: 207.8 LBS | OXYGEN SATURATION: 96 % | HEART RATE: 71 BPM | SYSTOLIC BLOOD PRESSURE: 132 MMHG

## 2022-05-10 DIAGNOSIS — E78.5 HYPERLIPIDEMIA, UNSPECIFIED HYPERLIPIDEMIA TYPE: ICD-10-CM

## 2022-05-10 DIAGNOSIS — D64.9 NORMOCYTIC ANEMIA: ICD-10-CM

## 2022-05-10 DIAGNOSIS — K21.9 GASTROESOPHAGEAL REFLUX DISEASE WITHOUT ESOPHAGITIS: ICD-10-CM

## 2022-05-10 DIAGNOSIS — Z00.00 MEDICARE ANNUAL WELLNESS VISIT, SUBSEQUENT: Primary | ICD-10-CM

## 2022-05-10 LAB
BASOPHILS ABSOLUTE: 0 K/UL (ref 0–0.2)
BASOPHILS RELATIVE PERCENT: 0.9 %
EOSINOPHILS ABSOLUTE: 0.2 K/UL (ref 0–0.6)
EOSINOPHILS RELATIVE PERCENT: 5.5 %
FERRITIN: 127.2 NG/ML (ref 30–400)
FOLATE: 18.62 NG/ML (ref 4.78–24.2)
HBA1C MFR BLD: 8.2 %
HCT VFR BLD CALC: 38.3 % (ref 40.5–52.5)
HEMOGLOBIN: 13 G/DL (ref 13.5–17.5)
IRON SATURATION: 21 % (ref 20–50)
IRON: 66 UG/DL (ref 59–158)
LYMPHOCYTES ABSOLUTE: 1.3 K/UL (ref 1–5.1)
LYMPHOCYTES RELATIVE PERCENT: 32.2 %
MCH RBC QN AUTO: 29.3 PG (ref 26–34)
MCHC RBC AUTO-ENTMCNC: 33.9 G/DL (ref 31–36)
MCV RBC AUTO: 86.3 FL (ref 80–100)
MONOCYTES ABSOLUTE: 0.3 K/UL (ref 0–1.3)
MONOCYTES RELATIVE PERCENT: 8.7 %
NEUTROPHILS ABSOLUTE: 2.1 K/UL (ref 1.7–7.7)
NEUTROPHILS RELATIVE PERCENT: 52.7 %
PDW BLD-RTO: 13.5 % (ref 12.4–15.4)
PLATELET # BLD: 283 K/UL (ref 135–450)
PMV BLD AUTO: 8 FL (ref 5–10.5)
RBC # BLD: 4.43 M/UL (ref 4.2–5.9)
TOTAL IRON BINDING CAPACITY: 314 UG/DL (ref 260–445)
VITAMIN B-12: 781 PG/ML (ref 211–911)
WBC # BLD: 4 K/UL (ref 4–11)

## 2022-05-10 PROCEDURE — 3052F HG A1C>EQUAL 8.0%<EQUAL 9.0%: CPT | Performed by: FAMILY MEDICINE

## 2022-05-10 PROCEDURE — 2022F DILAT RTA XM EVC RTNOPTHY: CPT | Performed by: FAMILY MEDICINE

## 2022-05-10 PROCEDURE — G0439 PPPS, SUBSEQ VISIT: HCPCS | Performed by: FAMILY MEDICINE

## 2022-05-10 PROCEDURE — G8427 DOCREV CUR MEDS BY ELIG CLIN: HCPCS | Performed by: FAMILY MEDICINE

## 2022-05-10 PROCEDURE — 99214 OFFICE O/P EST MOD 30 MIN: CPT | Performed by: FAMILY MEDICINE

## 2022-05-10 PROCEDURE — G8417 CALC BMI ABV UP PARAM F/U: HCPCS | Performed by: FAMILY MEDICINE

## 2022-05-10 PROCEDURE — 4040F PNEUMOC VAC/ADMIN/RCVD: CPT | Performed by: FAMILY MEDICINE

## 2022-05-10 PROCEDURE — 1036F TOBACCO NON-USER: CPT | Performed by: FAMILY MEDICINE

## 2022-05-10 PROCEDURE — 3017F COLORECTAL CA SCREEN DOC REV: CPT | Performed by: FAMILY MEDICINE

## 2022-05-10 PROCEDURE — 1123F ACP DISCUSS/DSCN MKR DOCD: CPT | Performed by: FAMILY MEDICINE

## 2022-05-10 PROCEDURE — 83036 HEMOGLOBIN GLYCOSYLATED A1C: CPT | Performed by: FAMILY MEDICINE

## 2022-05-10 PROCEDURE — 82044 UR ALBUMIN SEMIQUANTITATIVE: CPT | Performed by: FAMILY MEDICINE

## 2022-05-10 RX ORDER — LOSARTAN POTASSIUM 25 MG/1
25 TABLET ORAL 2 TIMES DAILY
Qty: 180 TABLET | Refills: 1 | Status: SHIPPED | OUTPATIENT
Start: 2022-05-10

## 2022-05-10 RX ORDER — HYDROCHLOROTHIAZIDE 25 MG/1
25 TABLET ORAL DAILY
COMMUNITY

## 2022-05-10 RX ORDER — METFORMIN HYDROCHLORIDE 750 MG/1
TABLET, EXTENDED RELEASE ORAL
Qty: 180 TABLET | Refills: 1 | Status: SHIPPED | OUTPATIENT
Start: 2022-05-10

## 2022-05-10 RX ORDER — FAMOTIDINE 20 MG/1
TABLET, FILM COATED ORAL
Qty: 180 TABLET | Refills: 0 | Status: SHIPPED | OUTPATIENT
Start: 2022-05-10 | End: 2022-06-21 | Stop reason: SDUPTHER

## 2022-05-10 RX ORDER — ACARBOSE 100 MG/1
TABLET ORAL
Qty: 90 TABLET | Refills: 1 | Status: SHIPPED | OUTPATIENT
Start: 2022-05-10

## 2022-05-10 RX ORDER — ATORVASTATIN CALCIUM 40 MG/1
TABLET, FILM COATED ORAL
Qty: 90 TABLET | Refills: 1 | Status: SHIPPED | OUTPATIENT
Start: 2022-05-10

## 2022-05-10 ASSESSMENT — PATIENT HEALTH QUESTIONNAIRE - PHQ9
SUM OF ALL RESPONSES TO PHQ QUESTIONS 1-9: 0
2. FEELING DOWN, DEPRESSED OR HOPELESS: 0
1. LITTLE INTEREST OR PLEASURE IN DOING THINGS: 0
SUM OF ALL RESPONSES TO PHQ9 QUESTIONS 1 & 2: 0
SUM OF ALL RESPONSES TO PHQ QUESTIONS 1-9: 0

## 2022-05-10 ASSESSMENT — LIFESTYLE VARIABLES
HOW OFTEN DO YOU HAVE A DRINK CONTAINING ALCOHOL: MONTHLY OR LESS
HOW MANY STANDARD DRINKS CONTAINING ALCOHOL DO YOU HAVE ON A TYPICAL DAY: 1 OR 2

## 2022-05-10 NOTE — PATIENT INSTRUCTIONS
Personalized Preventive Plan for Tuan Cardoso - 5/10/2022  Medicare offers a range of preventive health benefits. Some of the tests and screenings are paid in full while other may be subject to a deductible, co-insurance, and/or copay. Some of these benefits include a comprehensive review of your medical history including lifestyle, illnesses that may run in your family, and various assessments and screenings as appropriate. After reviewing your medical record and screening and assessments performed today your provider may have ordered immunizations, labs, imaging, and/or referrals for you. A list of these orders (if applicable) as well as your Preventive Care list are included within your After Visit Summary for your review. Other Preventive Recommendations:    · A preventive eye exam performed by an eye specialist is recommended every 1-2 years to screen for glaucoma; cataracts, macular degeneration, and other eye disorders. · A preventive dental visit is recommended every 6 months. · Try to get at least 150 minutes of exercise per week or 10,000 steps per day on a pedometer . · Order or download the FREE \"Exercise & Physical Activity: Your Everyday Guide\" from [employer]. Call 3-577.684.3540 or search [employer] online. · You need 5181-8113 mg of calcium and 3256-7342 IU of vitamin D per day. It is possible to meet your calcium requirement with diet alone, but a vitamin D supplement is usually necessary to meet this goal.  · When exposed to the sun, use a sunscreen that protects against both UVA and UVB radiation with an SPF of 30 or greater. Reapply every 2 to 3 hours or after sweating, drying off with a towel, or swimming. · Always wear a seat belt when traveling in a car. Always wear a helmet when riding a bicycle or motorcycle.

## 2022-05-10 NOTE — PROGRESS NOTES
Medicare Annual Wellness Visit    Tuan Cardoso is here for Diabetes and Medicare AWV    Assessment & Plan   Medicare annual wellness visit, subsequent  See below  Uncontrolled type 2 diabetes mellitus with retinopathy, with long-term current use of insulin (HCC)  -     POCT glycosylated hemoglobin (Hb A1C)  -     POCT microalbumin  -     metFORMIN (GLUCOPHAGE-XR) 750 MG extended release tablet; TAKE 2 TABLETS BY MOUTH ONCE DAILY WITH BREAKFAST, Disp-180 tablet, R-1Print  -     losartan (COZAAR) 25 MG tablet; Take 1 tablet by mouth 2 times daily, Disp-180 tablet, R-1Print  -     atorvastatin (LIPITOR) 40 MG tablet; Take 1 tablet by mouth once daily, Disp-90 tablet, R-1Print  -     acarbose (PRECOSE) 100 MG tablet; TAKE ONE TABLET BY MOUTH THREE TIMES A DAY WITH A MEAL, Disp-90 tablet, R-1Print   Encouraged regular physical activity and lower carb diet. May need to adjust morning insulin if running higher during the day. Keep night time insulin dose at 15 units as morning sugars are usually on the lower side. Normocytic anemia  -     CBC with Auto Differential  -     Ferritin  -     Folate  -     Iron and TIBC  -     Vitamin B12  Found on last 2 blood draws. Will anemia work up as above. May be anemia of chronic disease, but will confirm. Uncontrolled diabetes mellitus with microalbuminuria (HCC)  -     losartan (COZAAR) 25 MG tablet; Take 1 tablet by mouth 2 times daily, Disp-180 tablet, R-1Print  Hyperlipidemia, unspecified hyperlipidemia type  -     atorvastatin (LIPITOR) 40 MG tablet; Take 1 tablet by mouth once daily, Disp-90 tablet, R-1Print  Gastroesophageal reflux disease without esophagitis  -     famotidine (PEPCID) 20 MG tablet;  Take 1 tablet by mouth twice daily, Disp-180 tablet, R-0Print      Recommendations for Preventive Services Due: see orders and patient instructions/AVS.  Recommended screening schedule for the next 5-10 years is provided to the patient in written form: see Patient Instructions/AVS.     No follow-ups on file. Subjective   The following acute and/or chronic problems were also addressed today:  Chief Complaint   Patient presents with    Diabetes    Medicare AWV     Diabetes Mellitus Type 2: Current symptoms/problems include none. Medication compliance:  compliant most of the time  Diabetic diet compliance:  compliant most of the time,  Weight trend: decreasing  Current exercise: 4 days per week, 30 minutes  Barriers: none    Home blood sugar records: he states morning is always lower, sometimes low blood sugar in the morning  Any episodes of hypoglycemia? yes - mainly in the morning, better since his last visit however  Eye exam current (within one year): yes   reports that he has never smoked. He has never used smokeless tobacco.   Daily Aspirin? Yes  Known diabetic complications: nephropathy and retinopathy    Lab Results   Component Value Date    LABA1C 8.2 05/10/2022    LABA1C 8.2 02/07/2022    LABA1C 9.0 06/24/2021     Lab Results   Component Value Date    CREATININE 1.0 03/10/2022     Lab Results   Component Value Date    ALT 25 03/10/2022    AST 28 03/10/2022     No components found for: CHLPL  Lab Results   Component Value Date    TRIG 78 03/10/2022     Lab Results   Component Value Date    HDL 62 (H) 03/10/2022     Lab Results   Component Value Date    LDLCALC 74 03/10/2022           Patient's complete Health Risk Assessment and screening values have been reviewed and are found in Flowsheets. The following problems were reviewed today and where indicated follow up appointments were made and/or referrals ordered.     Positive Risk Factor Screenings with Interventions:             General Health and ACP:  General  In general, how would you say your health is?: Very Good  In the past 7 days, have you experienced any of the following: New or Increased Pain, New or Increased Fatigue, Loneliness, Social Isolation, Stress or Anger?: No  Do you get the social and emotional support that you need?: Yes  Do you have a Living Will?: Yes    Advance Directives     Power of  Living Will ACP-Advance Directive ACP-Power of     Not on File Not on File Not on File Not on File      General Health Risk Interventions:  · No Living Will: ACP documents already completed- patient asked to provide copy to the office    Health Habits/Nutrition:     Physical Activity: Insufficiently Active    Days of Exercise per Week: 4 days    Minutes of Exercise per Session: 30 min     Have you lost any weight without trying in the past 3 months?: No     Have you seen the dentist within the past year?: Yes    Health Habits/Nutrition Interventions:  · Nutritional issues:  educational materials for healthy, well-balanced diet provided             Objective   Vitals:    05/10/22 0903   BP: (!) 154/82   Site: Right Upper Arm   Position: Sitting   Cuff Size: Small Adult   Pulse: 71   SpO2: 96%   Weight: 207 lb 12.8 oz (94.3 kg)      Body mass index is 33.54 kg/m². General Appearance: alert and oriented to person, place and time, well-developed and well-nourished, in no acute distress  Skin: warm and dry, no rash or erythema       Allergies   Allergen Reactions    Lisinopril      cough    Other      INOVAR    Toujeo Solostar [Insulin Glargine]      Dizziness, weight gain    Viagra [Sildenafil Citrate] Other (See Comments)     headaches    Victoza [Liraglutide] Diarrhea, Nausea And Vomiting and Other (See Comments)     Nausea, abdominal pain       Prior to Visit Medications    Medication Sig Taking?  Authorizing Provider   hydroCHLOROthiazide (HYDRODIURIL) 25 MG tablet Take 25 mg by mouth daily Yes Historical Provider, MD   clotrimazole (LOTRIMIN) 1 % cream APPLY TOPICALLY TWICE A DAY Yes Bimal Andrews APRN - CNP   acarbose (PRECOSE) 100 MG tablet TAKE ONE TABLET BY MOUTH THREE TIMES A DAY WITH A MEAL Yes Raphael Mejia MD   Insulin Syringe-Needle U-100 (INSULIN SYRINGE .5CC/31GX5/16\") 31G X 5/16\" 0.5 ML MISC USE 1 SYRINGE SUBCUTANEOUSLY 4 TIMES DAILY Yes Jamila Reavesakhil, MARLIN - CNP   tadalafil (CIALIS) 5 MG tablet Take 1 tablet by mouth daily Yes MARLIN Stone   atorvastatin (LIPITOR) 40 MG tablet Take 1 tablet by mouth once daily Yes Gennaro Saleh, MARLIN - CNP   losartan (COZAAR) 25 MG tablet Take 1 tablet by mouth 2 times daily Yes MARLIN Diamond - CNP   metFORMIN (GLUCOPHAGE-XR) 750 MG extended release tablet TAKE 2 TABLETS BY MOUTH ONCE DAILY WITH BREAKFAST Yes MARLIN Diamond - CNP   famotidine (PEPCID) 20 MG tablet Take 1 tablet by mouth twice daily Yes MARLIN Diamond CNP   Handicap Placard MISC by Does not apply route Yes Cortney Rojo MD   montelukast (SINGULAIR) 10 MG tablet Take 1 tablet by mouth nightly Yes Cortney Rojo MD   fluticasone (FLONASE) 50 MCG/ACT nasal spray Use 1 spray(s) in each nostril once daily Yes Cortney Rojo MD   insulin 70-30 (NOVOLIN 70/30 RELION) (70-30) 100 UNIT per ML injection vial INJECT 25 UNITS SUBCUTANEOUSLY IN THE MORNING AND 10 TO 15 NIGHTLY Yes Cortney Rojo MD   Lancets MISC 1 each by Other route 3 times daily Dx 250.02 Yes Cortney Rojo MD   blood glucose monitor strips 1 strip by Other route 3 times daily (before meals) Freestyle Strips per patient.  Dx: E11.65 Yes Cortney Rojo MD   Blood Glucose Monitoring Suppl (FREESTYLE FREEDOM LITE) w/Device KIT 1 kit by Does not apply route daily Yes Cortney Rojo MD   albuterol (PROVENTIL) (2.5 MG/3ML) 0.083% nebulizer solution Take 3 mLs by nebulization every 4 hours as needed for Wheezing or Shortness of Breath Yes Cortney Rojo MD   Alcohol Swabs (ALCOHOL PADS) 70 % PADS 1 each by Does not apply route 2 times daily Yes Cortney Rojo MD   etodolac (LODINE) 300 MG capsule Take 1 capsule by mouth every 8 hours  Patient taking differently: Take 300 mg by mouth every 8 hours As needed Yes Cortney Rojo MD   magnesium oxide (MAG-OX) 400 (241.3 Mg) MG TABS tablet Take 1 tablet by mouth daily Yes Ever Cuevas MD   ciclopirox [facility]) 8 % solution Apply topically nightly. Yes Ever Cuevas MD   Multiple Vitamins-Minerals (THERAPEUTIC MULTIVITAMIN-MINERALS) tablet Take 1 tablet by mouth daily Yes Historical Provider, MD   albuterol sulfate (PROAIR RESPICLICK) 995 (90 BASE) MCG/ACT aerosol powder inhalation Inhale 1-2 puffs into the lungs every 4 hours as needed for Wheezing or Shortness of Breath Yes Ever Cuevas MD   aspirin 81 MG chewable tablet Take 81 mg by mouth daily. OTC  Yes Historical Provider, MD   fish oil-omega-3 fatty acids 1000 MG capsule Take 2 g by mouth daily.  OTC Yes Historical Provider, MD       CareTeam (Including outside providers/suppliers regularly involved in providing care):   Patient Care Team:  Ever Cuevas MD as PCP - Kandis Burnett MD as PCP - [facility] Empaneled Provider    Reviewed and updated this visit:  Tobacco  Allergies  Meds  Problems  Med Hx  Surg Hx  Soc Hx  Fam Hx         Results for POC orders placed in visit on 05/10/22   POCT glycosylated hemoglobin (Hb A1C)   Result Value Ref Range    Hemoglobin A1C 8.2 %       Lab Review   Lab Results   Component Value Date     03/10/2022    K 4.5 03/10/2022     03/10/2022    CO2 27 03/10/2022    BUN 12 03/10/2022    CREATININE 1.0 03/10/2022    GLUCOSE 90 03/10/2022    CALCIUM 9.2 03/10/2022     Lab Results   Component Value Date    WBC 4.6 03/10/2022    HGB 12.4 03/10/2022    HCT 37.1 03/10/2022    MCV 86.6 03/10/2022     03/10/2022     Lab Results   Component Value Date    CHOL 152 03/10/2022    TRIG 78 03/10/2022    HDL 62 03/10/2022    HDL 53 06/01/2012

## 2022-05-11 LAB — NONINV COLON CA DNA+OCC BLD SCRN STL QL: NEGATIVE

## 2022-06-21 ENCOUNTER — TELEPHONE (OUTPATIENT)
Dept: FAMILY MEDICINE CLINIC | Age: 72
End: 2022-06-21

## 2022-06-21 ENCOUNTER — HOSPITAL ENCOUNTER (OUTPATIENT)
Dept: GENERAL RADIOLOGY | Age: 72
Discharge: HOME | End: 2022-06-21
Payer: MEDICARE

## 2022-06-21 ENCOUNTER — HOSPITAL ENCOUNTER (OUTPATIENT)
Age: 72
Discharge: HOME | End: 2022-06-21
Payer: MEDICARE

## 2022-06-21 ENCOUNTER — SCHEDULED TELEPHONE ENCOUNTER (OUTPATIENT)
Dept: PRIMARY CARE CLINIC | Age: 72
End: 2022-06-21
Payer: MEDICARE

## 2022-06-21 DIAGNOSIS — R05.8 PRODUCTIVE COUGH: ICD-10-CM

## 2022-06-21 DIAGNOSIS — R11.10 VOMITING, INTRACTABILITY OF VOMITING NOT SPECIFIED, PRESENCE OF NAUSEA NOT SPECIFIED, UNSPECIFIED VOMITING TYPE: ICD-10-CM

## 2022-06-21 DIAGNOSIS — K21.9 GASTROESOPHAGEAL REFLUX DISEASE WITHOUT ESOPHAGITIS: ICD-10-CM

## 2022-06-21 DIAGNOSIS — R05.9 COUGH: ICD-10-CM

## 2022-06-21 DIAGNOSIS — R06.2 WHEEZING: ICD-10-CM

## 2022-06-21 DIAGNOSIS — J20.9 ACUTE BRONCHITIS, UNSPECIFIED ORGANISM: Primary | ICD-10-CM

## 2022-06-21 PROCEDURE — 99442 PR PHYS/QHP TELEPHONE EVALUATION 11-20 MIN: CPT | Performed by: NURSE PRACTITIONER

## 2022-06-21 PROCEDURE — 71046 X-RAY EXAM CHEST 2 VIEWS: CPT

## 2022-06-21 RX ORDER — BENZONATATE 100 MG/1
100-200 CAPSULE ORAL 3 TIMES DAILY PRN
Qty: 40 CAPSULE | Refills: 0 | Status: SHIPPED | OUTPATIENT
Start: 2022-06-21 | End: 2022-06-28

## 2022-06-21 RX ORDER — AZITHROMYCIN 250 MG/1
250 TABLET, FILM COATED ORAL SEE ADMIN INSTRUCTIONS
Qty: 6 TABLET | Refills: 0 | Status: SHIPPED | OUTPATIENT
Start: 2022-06-21 | End: 2022-06-26

## 2022-06-21 RX ORDER — FAMOTIDINE 20 MG/1
TABLET, FILM COATED ORAL
Qty: 180 TABLET | Refills: 0 | Status: SHIPPED | OUTPATIENT
Start: 2022-06-21

## 2022-06-21 NOTE — TELEPHONE ENCOUNTER
----- Message from Litzy Bunch sent at 6/21/2022  9:28 AM EDT -----  Subject: Appointment Request    Reason for Call: Urgent Cough Cold    QUESTIONS  Type of Appointment? Established Patient  Reason for appointment request? Other - [facility] unable to schedule  Additional Information for Provider? Pt's wife Kailey Hays calling to   schedule appt for pt w/ Dr Levin. [facility] unable to schedule. Appt is urgent   due to vomiting 1 day last week believed to be caused by heat stroke &   persistent cough since then w/ chills. Transferred to practice, please   verify that booking guidelines are correct if [facility] should be able to   schedule this appt  ---------------------------------------------------------------------------  --------------  CALL BACK INFO  What is the best way for the office to contact you? Do not leave any   message, patient will call back for answer  Preferred Call Back Phone Number? [phone]  ---------------------------------------------------------------------------  --------------  SCRIPT ANSWERS  Relationship to Patient? Other  Representative Name? Kailey aHys  Additional information verified (besides Name and Date of Birth)? Phone   Number  Are you currently unable to finish sentences due to any difficulty   breathing? No  Are you unable to swallow liquids? No  Are you having fevers (100.4 or greater), chills, or sweats?  Yes
Message/Telephone call regarding - New or worsening symptoms      Symptoms reported - Pulmonary / Respiratory- chest congestion / wheezing  and cough - no sputum - ALERT - INDICATE RED VISIT IN APPT NOTES Patient was working outside Last Tuesday and believes that he got over heated, Patient states he started Vomiting and lasted for about 3 hours. Since then Patient started with a dry cough that has now sounding wet/ and is congested. Wife notes wheezing at night time when patient is sleeping. Patient noted to also have increase Fatigue and chills at times  Pain Scale - denies    When did symptoms start - a week ago  Onset - with a sudden onset    Aggravating factors - same and persistently getting worse   Relieving actions and treatment(s) attempted - Nightquil with no releif just upsets stomach     Last Appointment at [facility] - 5/10/2022  Next Appointment - [email or web address]      Is there any other information to share with PCP -  yes - scheduled to be seen today with [facility] after scheduling appointment.     Future Appointments   Date Time Provider Som Lucas   6/21/2022 10:30 AM MARLIN Arroyo - CNP [facility]   7/11/2022 10:00 AM DO JIN Moran ENT [facility]   9/8/2022  2:45 PM MD HANDY Wilkins
Nothing available today, should he go to the ER?  Please advise
no

## 2022-06-21 NOTE — PATIENT INSTRUCTIONS
Notify office if you do not improve or have worsening of condition. If no improvement or worsening follow up with PCP in 3-4 days. Encouraged to purchase pulse ox. IF Pulse ox falls below 92% follow up with PCP immediately. Take medication as prescribed. Take antibiotics medication until all gone. Drink plenty of fluids and rest.  Practice good hand hygiene. May sleep with humidifier as needed. Gargle with warm salt water 3-4 times a day to help with sore throat. You can use ice, warm chicken noodle soup, soft foods, popsicles and cough drops to help soothe your throat. May take Tylenol/Ibuprofen (alternate) as needed for fever/pain. Even though vaccinated based on symptoms would test for COVID, home test is ok. Continue with GERD Plan of Care.

## 2022-06-21 NOTE — LETTER
I had the pleasure of seeing [facility] today for a primary care virtualist video visit secondary to Congestion/Cough/Possible Aspiration of Vomitus last week. I have provided the following recommendations: Please refer to note. I have included my note for your review and have asked the patient to follow up with you 4 days if no improvement or sooner if worsening. If you have questions, please reach out via [employer] secure messaging by searching for the [facility] Primary Care Virtualists. Your communication will be answered promptly by the Virtualist on service for the day. Additionally, we would love your overall feedback on this visit. Please hit shift and click the following link to let us know if the Virtualist service met your expectations. [employer] [id number]      Electronically signed by MARLIN Hui CNP on 6/21/22 at 1:44 PM EDT

## 2022-06-21 NOTE — PROGRESS NOTES
Melba Spatz is a 66 y.o. male evaluated via telephone on 6/21/2022 for Cough, Congestion, Emesis (Was vomiting last week and feels he might have aspirated into lungs), and Chills  . Documentation:  I communicated with the patient and/or health care decision maker about:  Was scheduled Video visit but was changed to Telephone visit due to video failure to connect; patient camera would not connect or work. Last Wednesday on his way to meeting all of the sudden felt hot and dizzy sensation pulled over to side of road and started with \"violent vomiting\" was able to call wife to come pick him up. Since then rattling in the chest and coughing up green phlegm and having chills, feels something in chest rattling. Wife notes wheezing worse at night. HOB is elevated due to GERD and possible of aspiration, Does have albuterol inhaler and did use once. Dr. Alesia Fortune has him on Pepcid due to GERD (Chronic cough thinks due to GERD). No additional medication other than what is prescribed is taken for this at this time. Feels may have PNA, most likely bronchitis but will send for STAT CXR and r/o aspiration PNA. Some SOB no more that usual. No chest pain. No further N/V/D. No other sick contacts. No like illness. Did not test for COVID. Do recommend at least home test. He is COVID Vaccinated and boosted. Family thinks may have been due to heat exposure and patient feels like it was food poisoning. Allergies and medications reviewed with patient. Details of this discussion including any medical advice provided:    1. Acute bronchitis, unspecified organism-Continuing  Notify office if you do not improve or have worsening of condition. Take medication as prescribed. Take antibiotics medication until all gone. Drink plenty of fluids and rest.  Practice good hand hygiene. May sleep with humidifier as needed. Gargle with warm salt water 3-4 times a day to help with sore throat.   You can use ice, warm chicken noodle soup, soft foods, popsicles and cough drops to help soothe your throat. May take Tylenol/Ibuprofen (alternate) as needed for fever/pain. Even though vaccinated based on symptoms would test for COVID, home test is ok. - azithromycin (ZITHROMAX) 250 MG tablet; Take 1 tablet by mouth See Admin Instructions for 5 days 500mg on day 1 followed by 250mg on days 2 - 5  Dispense: 6 tablet; Refill: 0    2. Vomiting, intractability of vomiting not specified, presence of nausea not specified, unspecified vomiting type-Resolved  Notify office if your symptoms return. Continue to follow GERD POC. - XR CHEST STANDARD (2 VW); Future    3. Productive cough-New  STAT CXR  Notify office if you have no improvement or worsening of condition. Take medication as prescribed. Recommend taking COVID test if continues even though vaccinated can still have break through illness. If no improvement or worsening follow up with PCP in 3-4 days. Encouraged to purchase pulse ox. IF Pulse ox falls below 92% follow up with PCP immediately. - XR CHEST STANDARD (2 VW); Future  - azithromycin (ZITHROMAX) 250 MG tablet; Take 1 tablet by mouth See Admin Instructions for 5 days 500mg on day 1 followed by 250mg on days 2 - 5  Dispense: 6 tablet; Refill: 0  - benzonatate (TESSALON) 100 MG capsule; Take 1-2 capsules by mouth 3 times daily as needed for Cough  Dispense: 40 capsule; Refill: 0    4. [address] [facility]  Notify office if you have no improvement, any changes or worsening of condition. Continue with inhalers as prescribed. See above plan. - XR CHEST STANDARD (2 VW); Future    5. Gastroesophageal reflux disease without esophagitis-Ongoing  Notify office if you have no improvement or worsening of condition. Needs refill of medication. Was filled 5/2022, put does not have. Continue to elevate HOB. Continue current medication as prescribed. Follow up with PCP if no improvement or continues.     - famotidine (PEPCID) 20 MG tablet; Take 1 tablet by mouth twice daily  Dispense: 180 tablet; Refill: 0    Scheduled follow up with Dr. Bettye Mccann. 9/8/2022. Return if symptoms worsen or fail to improve. Total Time: minutes: 11-20 minutes    Tomás Mir was evaluated through a synchronous (real-time) audio encounter. Patient identification was verified at the start of the visit. He (or guardian if applicable) is aware that this is a billable service, which includes applicable co-pays. This visit was conducted with the patient's (and/or legal guardian's) verbal consent. He has not had a related appointment within my department in the past 7 days or scheduled within the next 24 hours. The patient was located at Home: [address] [place] [address]. The provider was located at Other: Home: [place].     Note: not billable if this call serves to triage the patient into an appointment for the relevant concern    Bhavya Diaz, APRN - CNP

## 2022-07-11 ENCOUNTER — OFFICE VISIT (OUTPATIENT)
Dept: ENT CLINIC | Age: 72
End: 2022-07-11
Payer: MEDICARE

## 2022-07-11 VITALS
TEMPERATURE: 98.6 F | WEIGHT: 207 LBS | DIASTOLIC BLOOD PRESSURE: 82 MMHG | SYSTOLIC BLOOD PRESSURE: 130 MMHG | BODY MASS INDEX: 33.27 KG/M2 | HEIGHT: 66 IN

## 2022-07-11 DIAGNOSIS — H61.23 BILATERAL IMPACTED CERUMEN: Primary | ICD-10-CM

## 2022-07-11 DIAGNOSIS — H90.3 SENSORINEURAL HEARING LOSS (SNHL) OF BOTH EARS: ICD-10-CM

## 2022-07-11 PROCEDURE — 3017F COLORECTAL CA SCREEN DOC REV: CPT | Performed by: STUDENT IN AN ORGANIZED HEALTH CARE EDUCATION/TRAINING PROGRAM

## 2022-07-11 PROCEDURE — 69210 REMOVE IMPACTED EAR WAX UNI: CPT | Performed by: STUDENT IN AN ORGANIZED HEALTH CARE EDUCATION/TRAINING PROGRAM

## 2022-07-11 PROCEDURE — 1123F ACP DISCUSS/DSCN MKR DOCD: CPT | Performed by: STUDENT IN AN ORGANIZED HEALTH CARE EDUCATION/TRAINING PROGRAM

## 2022-07-11 PROCEDURE — 99213 OFFICE O/P EST LOW 20 MIN: CPT | Performed by: STUDENT IN AN ORGANIZED HEALTH CARE EDUCATION/TRAINING PROGRAM

## 2022-07-11 PROCEDURE — 1036F TOBACCO NON-USER: CPT | Performed by: STUDENT IN AN ORGANIZED HEALTH CARE EDUCATION/TRAINING PROGRAM

## 2022-07-11 PROCEDURE — G8427 DOCREV CUR MEDS BY ELIG CLIN: HCPCS | Performed by: STUDENT IN AN ORGANIZED HEALTH CARE EDUCATION/TRAINING PROGRAM

## 2022-07-11 PROCEDURE — G8417 CALC BMI ABV UP PARAM F/U: HCPCS | Performed by: STUDENT IN AN ORGANIZED HEALTH CARE EDUCATION/TRAINING PROGRAM

## 2022-07-11 ASSESSMENT — ENCOUNTER SYMPTOMS
EYE PAIN: 0
COUGH: 0
NAUSEA: 0
VOMITING: 0
SHORTNESS OF BREATH: 0
RHINORRHEA: 0

## 2022-07-11 NOTE — PROGRESS NOTES
 Cancer Father     Diabetes Father     Diabetes Maternal Grandfather     Diabetes Paternal Grandmother      Social History     Socioeconomic History    Marital status:      Spouse name: Not on file    Number of children: Not on file    Years of education: Not on file    Highest education level: Not on file   Occupational History    Not on file   Tobacco Use    Smoking status: Never Smoker    Smokeless tobacco: Never Used   Vaping Use    Vaping Use: Never used   Substance and Sexual Activity    Alcohol use: No     Comment: rarely     Drug use: No    Sexual activity: Yes   Other Topics Concern    Not on file   Social History Narrative    Not on file     Social Determinants of Health     Financial Resource Strain: Low Risk     Difficulty of Paying Living Expenses: Not hard at all   Food Insecurity: No Food Insecurity    Worried About [facility] in the Last Year: Never true    Ronda of Food in the Last Year: Never true   Transportation Needs: No Transportation Needs    Lack of Transportation (Medical): No    Lack of Transportation (Non-Medical):  No   Physical Activity: Insufficiently Active    Days of Exercise per Week: 4 days    Minutes of Exercise per Session: 30 min   Stress:     Feeling of Stress : Not on file   Social Connections:     Frequency of Communication with Friends and Family: Not on file    Frequency of Social Gatherings with Friends and Family: Not on file    Attends Church Services: Not on file    Active Member of Clubs or Organizations: Not on file    Attends Club or Organization Meetings: Not on file    Marital Status: Not on file   Intimate Partner Violence:     Fear of Current or Ex-Partner: Not on file    Emotionally Abused: Not on file    Physically Abused: Not on file    Sexually Abused: Not on file   Housing Stability: [address] Unable to Pay for Housing in the Last Year: No    Number of Places Lived in the Last Year: 1    Unstable Housing in the Last Year: No       DRUG/FOOD ALLERGIES: Lisinopril, Other, Toujeo solostar [insulin glargine], Viagra [sildenafil citrate], and Victoza [liraglutide]    CURRENT MEDICATIONS  Prior to Admission medications    Medication Sig Start Date End Date Taking?  Authorizing Provider   famotidine (PEPCID) 20 MG tablet Take 1 tablet by mouth twice daily 6/21/22  Yes MARLIN Khan CNP   hydroCHLOROthiazide (HYDRODIURIL) 25 MG tablet Take 25 mg by mouth daily   Yes Historical Provider, MD   metFORMIN (GLUCOPHAGE-XR) 750 MG extended release tablet TAKE 2 TABLETS BY MOUTH ONCE DAILY WITH BREAKFAST 5/10/22  Yes Maria Elena Jordan MD   losartan (COZAAR) 25 MG tablet Take 1 tablet by mouth 2 times daily 5/10/22  Yes Maria Elena Jordan MD   atorvastatin (LIPITOR) 40 MG tablet Take 1 tablet by mouth once daily 5/10/22  Yes Maria Elena Jordan MD   acarbose (PRECOSE) 100 MG tablet TAKE ONE TABLET BY MOUTH THREE TIMES A DAY WITH A MEAL 5/10/22  Yes Maria Elena Jordan MD   clotrimazole (LOTRIMIN) 1 % cream APPLY TOPICALLY TWICE A DAY 4/27/22  Yes Timothy DamasoMARLIN CNP   Insulin Syringe-Needle U-100 (INSULIN SYRINGE .5CC/31GX5/16\") 31G X 5/16\" 0.5 ML MISC USE 1 SYRINGE SUBCUTANEOUSLY 4 TIMES DAILY 4/6/22  Yes MARLIN Mack CNP   tadalafil (CIALIS) 5 MG tablet Take 1 tablet by mouth daily 3/30/22  Yes MARLIN Monroe   Handicap Placard [facility] [facility] by Does not apply route 2/7/22  Yes Maria Elena Jordan MD   montelukast (SINGULAIR) 10 MG tablet Take 1 tablet by mouth nightly 2/7/22  Yes Maria Elena Jordan MD   fluticasone (FLONASE) 50 MCG/ACT nasal spray Use 1 spray(s) in each nostril once daily 1/11/22  Yes Maria Elena Jordan MD   insulin 70-30 (NOVOLIN 70/30 RELION) (70-30) 100 UNIT per ML injection vial INJECT 25 UNITS SUBCUTANEOUSLY IN THE MORNING AND 10 TO 15 NIGHTLY 12/30/21  Yes Maria Elena Jordan MD   Lancets MISC 1 each by Other route 3 times daily Dx 250.02 6/21/21  Yes Maria Elena Jordan MD   blood glucose monitor strips 1 strip Making:   The following items were considered in medical decision making:  Independent review of images  Review / order clinical lab tests  Review / order radiology tests  Decision to obtain old records

## 2022-09-08 ENCOUNTER — OFFICE VISIT (OUTPATIENT)
Dept: FAMILY MEDICINE CLINIC | Age: 72
End: 2022-09-08
Payer: MEDICARE

## 2022-09-08 VITALS
OXYGEN SATURATION: 96 % | DIASTOLIC BLOOD PRESSURE: 70 MMHG | HEART RATE: 69 BPM | WEIGHT: 208 LBS | SYSTOLIC BLOOD PRESSURE: 136 MMHG | BODY MASS INDEX: 33.57 KG/M2

## 2022-09-08 DIAGNOSIS — S46.012A ROTATOR CUFF STRAIN, LEFT, INITIAL ENCOUNTER: ICD-10-CM

## 2022-09-08 DIAGNOSIS — I10 DIABETES MELLITUS WITH COINCIDENT HYPERTENSION (HCC): ICD-10-CM

## 2022-09-08 DIAGNOSIS — E11.9 DIABETES MELLITUS WITH COINCIDENT HYPERTENSION (HCC): ICD-10-CM

## 2022-09-08 DIAGNOSIS — G89.29 CHRONIC MIDLINE LOW BACK PAIN, UNSPECIFIED WHETHER SCIATICA PRESENT: ICD-10-CM

## 2022-09-08 DIAGNOSIS — M25.512 ACUTE PAIN OF LEFT SHOULDER: ICD-10-CM

## 2022-09-08 DIAGNOSIS — M54.50 CHRONIC MIDLINE LOW BACK PAIN, UNSPECIFIED WHETHER SCIATICA PRESENT: ICD-10-CM

## 2022-09-08 LAB — HBA1C MFR BLD: 8.2 %

## 2022-09-08 PROCEDURE — G8427 DOCREV CUR MEDS BY ELIG CLIN: HCPCS | Performed by: FAMILY MEDICINE

## 2022-09-08 PROCEDURE — 83036 HEMOGLOBIN GLYCOSYLATED A1C: CPT | Performed by: FAMILY MEDICINE

## 2022-09-08 PROCEDURE — 82044 UR ALBUMIN SEMIQUANTITATIVE: CPT | Performed by: FAMILY MEDICINE

## 2022-09-08 PROCEDURE — 1123F ACP DISCUSS/DSCN MKR DOCD: CPT | Performed by: FAMILY MEDICINE

## 2022-09-08 PROCEDURE — 1036F TOBACCO NON-USER: CPT | Performed by: FAMILY MEDICINE

## 2022-09-08 PROCEDURE — 99214 OFFICE O/P EST MOD 30 MIN: CPT | Performed by: FAMILY MEDICINE

## 2022-09-08 PROCEDURE — 3017F COLORECTAL CA SCREEN DOC REV: CPT | Performed by: FAMILY MEDICINE

## 2022-09-08 PROCEDURE — 3052F HG A1C>EQUAL 8.0%<EQUAL 9.0%: CPT | Performed by: FAMILY MEDICINE

## 2022-09-08 PROCEDURE — 2022F DILAT RTA XM EVC RTNOPTHY: CPT | Performed by: FAMILY MEDICINE

## 2022-09-08 PROCEDURE — G8417 CALC BMI ABV UP PARAM F/U: HCPCS | Performed by: FAMILY MEDICINE

## 2022-09-08 NOTE — LETTER
[address] [facility] [address]  [address] [address]  Phone: [phone]  Fax: [phone]    Michelle Regalado MD         September 8, 2022     Patient: Kinsey Olivier   YOB: 1950   Date of Visit: 9/8/2022       To Whom It May Concern:     It is my medical opinion that [address] a disability parking placard for the following reasons:  He has limited walking ability due to an orthopedic condition.   Duration of need: permanent     If you have any questions or concerns, please don't hesitate to call.     Sincerely,  Priscilla Vizcaino MD

## 2022-09-12 ASSESSMENT — ENCOUNTER SYMPTOMS: BACK PAIN: 1

## 2022-09-12 NOTE — PROGRESS NOTES
Evei Zelaya (:  1950) is a 66 y.o. 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 Uncontrolled type 2 diabetes mellitus with retinopathy, with long-term current use of insulin ([facility])  -     POCT glycosylated hemoglobin (Hb A1C)  -     empagliflozin (JARDIANCE) 10 MG tablet; Take 1 tablet by mouth daily, Disp-30 tablet, R-3Normal  -     POCT microalbumin  Not yet at goal. Will continue his current medications and add Jardiance for better glycemic control. 2. Chronic midline low back pain, unspecified whether sciatica present  -     [facility]; Starting Thu 2022, Disp-1 each, R-0, Print  3. Acute pain of left shoulder  Natural history and expected course discussed. Questions answered. Educational materials distributed. Home exercises provided. Patient has Lodine at home he will begin taking with food. Call or return to clinic prn if these symptoms worsen or fail to improve as anticipated. 4. Rotator cuff strain, left, initial encounter  Home exercises provided. Patient has Lodine at home he will begin taking with food. Call or return to clinic prn if these symptoms worsen or fail to improve as anticipated. Given his DM, he is at risk for frozen shoulder. Should he develop decreased ROM, would benefit from formal PT.   5. Diabetes mellitus with coincident hypertension (HCC)  -     empagliflozin (JARDIANCE) 10 MG tablet; Take 1 tablet by mouth daily, Disp-30 tablet, R-3Normal  BP doing well today. Will continue losartan and HCTZ. Adding Jardiance may help as well. Return in about 3 months (around 2022) for Diabetes.          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 He has type 2 diabetes mellitus. His disease course has been stable. Hypoglycemia symptoms include dizziness, pallor and sweats. Hypoglycemia complications include required assistance. Diabetic complications include retinopathy. Risk factors for coronary artery disease include male sex, obesity, diabetes mellitus, dyslipidemia and hypertension. Current diabetic treatment includes insulin injections (also metformin). His weight is stable. When asked about meal planning, he reported none. He participates in exercise daily. There is no change in his home blood glucose trend. An ACE inhibitor/angiotensin II receptor blocker is being taken. Eye exam is current ([facility] at [facility]). Shoulder Pain   The pain is present in the left shoulder. This is a new problem. The current episode started 1 to 4 weeks ago (4 weeks ago). 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 The problem occurs constantly. The problem has been unchanged. The pain is moderate. Pertinent negatives include no inability to bear weight, limited range of motion, numbness or tingling. The symptoms are aggravated by activity. His past medical history is significant for diabetes. Hypertension  The current episode started more than 1 month ago. The problem has been waxing and waning since onset. Condition status: at times BP will go up, but comes back down again. Associated symptoms include sweats. Past treatments include angiotensin blockers and diuretics. The current treatment provides significant improvement. Hypertensive end-organ damage includes retinopathy. Review of Systems   Musculoskeletal:  Positive for back pain (requesting handicap placard renewal) and gait problem. Skin:  Positive for pallor. Neurological:  Positive for dizziness.  Negative for tingling and

## 2022-09-30 NOTE — TELEPHONE ENCOUNTER
.Refill Request     CONFIRM preferrred pharmacy with the patient. If Mail Order Rx - Pend for 90 day refill. Last Seen: Last Seen Department: 9/8/2022  Last Seen by PCP: 9/8/2022    Last Written: 12-30-21 20ml with 2     If no future appointment scheduled, [facility]. Next Appointment:   Future Appointments   Date Time Provider Som Lucas   1/6/2023  9:30 AM MD HANDY Brady [place] - [facility]   1[facility]16[facility]2023  [facility]   3/9/2023  2:45 PM MD HANDY Brady [place] - [facility]       Message sent to [address] [place] [address] to schedule appt with patient?   N/A      Requested Prescriptions     Pending Prescriptions Disp Refills    insulin 70-30 (NOVOLIN 70/30 RELION) (70-30) 100 UNIT per ML injection vial [Pharmacy Med Name: NovoLIN 70/30 ReliOn (70-30) 100 UNIT/ML Subcutaneous Suspension] 20 mL 0     Sig: INJECT 25 UNITS SUBCUTANEOUSLY IN THE MORNING AND 10-15 UNITS NIGHTLY

## 2022-11-20 DIAGNOSIS — B35.4 TINEA CORPORIS: ICD-10-CM

## 2022-11-21 RX ORDER — CLOTRIMAZOLE 1 %
CREAM (GRAM) TOPICAL
Qty: 30 G | Refills: 1 | Status: SHIPPED | OUTPATIENT
Start: 2022-11-21

## 2022-11-21 NOTE — TELEPHONE ENCOUNTER
Refill Request     CONFIRM preferrred pharmacy with the patient. If Mail Order Rx - Pend for 90 day refill. Last Seen: Last Seen Department: 9/8/2022  Last Seen by PCP: 4/1/2022    Last Written: 04/27/2022 30 g  refills     If no future appointment scheduled, route STAFF MESSAGE with patient name to the [facility] for scheduling. Next Appointment:   Future Appointments   Date Time Provider Som Lucas   1/6/2023  9:30 AM MD HANDY Miranda  [place] - [facility]   1/16/2023  9:00 AM DO JIN [facility]   3/9/2023  2:45 PM MD HANDY Miranda  [place] - [facility]       Message sent to [employer] to schedule appt with patient?   NO      Requested Prescriptions     Pending Prescriptions Disp Refills    clotrimazole (LOTRIMIN) 1 % cream 30 g 1     Sig: APPLY TOPICALLY TWICE A DAY

## 2023-01-06 ENCOUNTER — OFFICE VISIT (OUTPATIENT)
Dept: FAMILY MEDICINE CLINIC | Age: 73
End: 2023-01-06

## 2023-01-06 ENCOUNTER — HOSPITAL ENCOUNTER (OUTPATIENT)
Dept: GENERAL RADIOLOGY | Age: 73
Discharge: HOME | End: 2023-01-06
Payer: MEDICARE

## 2023-01-06 ENCOUNTER — HOSPITAL ENCOUNTER (OUTPATIENT)
Age: 73
Discharge: HOME | End: 2023-01-06
Payer: MEDICARE

## 2023-01-06 VITALS
SYSTOLIC BLOOD PRESSURE: 180 MMHG | BODY MASS INDEX: 34.41 KG/M2 | DIASTOLIC BLOOD PRESSURE: 82 MMHG | WEIGHT: 213.2 LBS | OXYGEN SATURATION: 99 % | HEART RATE: 77 BPM

## 2023-01-06 DIAGNOSIS — I15.2 OBESITY, DIABETES, AND HYPERTENSION SYNDROME (HCC): ICD-10-CM

## 2023-01-06 DIAGNOSIS — E78.5 HYPERLIPIDEMIA, UNSPECIFIED HYPERLIPIDEMIA TYPE: ICD-10-CM

## 2023-01-06 DIAGNOSIS — G89.29 CHRONIC LEFT SHOULDER PAIN: ICD-10-CM

## 2023-01-06 DIAGNOSIS — M25.512 CHRONIC LEFT SHOULDER PAIN: ICD-10-CM

## 2023-01-06 DIAGNOSIS — E11.69 OBESITY, DIABETES, AND HYPERTENSION SYNDROME (HCC): ICD-10-CM

## 2023-01-06 DIAGNOSIS — E11.69 TYPE 2 DIABETES MELLITUS WITH OBESITY (HCC): ICD-10-CM

## 2023-01-06 DIAGNOSIS — E11.59 OBESITY, DIABETES, AND HYPERTENSION SYNDROME (HCC): ICD-10-CM

## 2023-01-06 DIAGNOSIS — R80.9 PROTEINURIA, UNSPECIFIED TYPE: ICD-10-CM

## 2023-01-06 DIAGNOSIS — R31.9 HEMATURIA, UNSPECIFIED TYPE: ICD-10-CM

## 2023-01-06 DIAGNOSIS — E11.65 UNCONTROLLED TYPE 2 DIABETES MELLITUS WITH HYPERGLYCEMIA (HCC): Primary | ICD-10-CM

## 2023-01-06 DIAGNOSIS — E66.9 TYPE 2 DIABETES MELLITUS WITH OBESITY (HCC): ICD-10-CM

## 2023-01-06 DIAGNOSIS — E78.5 DM TYPE 2 WITH DIABETIC DYSLIPIDEMIA (HCC): ICD-10-CM

## 2023-01-06 DIAGNOSIS — R05.3 CHRONIC COUGH: ICD-10-CM

## 2023-01-06 DIAGNOSIS — E11.649 UNCONTROLLED TYPE 2 DIABETES MELLITUS WITH HYPOGLYCEMIA WITHOUT COMA (HCC): ICD-10-CM

## 2023-01-06 DIAGNOSIS — R60.0 BILATERAL LOWER EXTREMITY EDEMA: ICD-10-CM

## 2023-01-06 DIAGNOSIS — E66.9 OBESITY, DIABETES, AND HYPERTENSION SYNDROME (HCC): ICD-10-CM

## 2023-01-06 DIAGNOSIS — I10 UNCONTROLLED HYPERTENSION: ICD-10-CM

## 2023-01-06 DIAGNOSIS — E11.69 DM TYPE 2 WITH DIABETIC DYSLIPIDEMIA (HCC): ICD-10-CM

## 2023-01-06 DIAGNOSIS — I10 DIABETES MELLITUS WITH COINCIDENT HYPERTENSION (HCC): ICD-10-CM

## 2023-01-06 DIAGNOSIS — R97.20 ELEVATED PSA: ICD-10-CM

## 2023-01-06 DIAGNOSIS — E11.9 DIABETES MELLITUS WITH COINCIDENT HYPERTENSION (HCC): ICD-10-CM

## 2023-01-06 DIAGNOSIS — K21.9 GASTROESOPHAGEAL REFLUX DISEASE WITHOUT ESOPHAGITIS: ICD-10-CM

## 2023-01-06 DIAGNOSIS — E11.3293 MILD NONPROLIFERATIVE DIABETIC RETINOPATHY OF BOTH EYES ASSOCIATED WITH TYPE 2 DIABETES MELLITUS, MACULAR EDEMA PRESENCE UNSPECIFIED (HCC): ICD-10-CM

## 2023-01-06 DIAGNOSIS — R80.9 PROTEINURIA, UNSPECIFIED TYPE: Primary | ICD-10-CM

## 2023-01-06 LAB
BILIRUBIN, POC: NORMAL
BLOOD URINE, POC: NORMAL
CLARITY, POC: NORMAL
COLOR, POC: YELLOW
CREATININE URINE POCT: 100
GLUCOSE URINE, POC: NORMAL
HBA1C MFR BLD: 7.9 %
HOURS COLLECTED: NORMAL
KETONES, POC: NORMAL
LEUKOCYTE EST, POC: NORMAL
MICROALBUMIN/CREAT 24H UR: 150 MG/G{CREAT}
MICROALBUMIN/CREAT UR-RTO: >300
NITRITE, POC: NORMAL
PH, POC: 7
PROTEIN, POC: >=300
SPECIFIC GRAVITY, POC: 1.02
URINE TOTAL VOLUME: NORMAL
UROBILINOGEN, POC: 0.2

## 2023-01-06 PROCEDURE — 73030 X-RAY EXAM OF SHOULDER: CPT

## 2023-01-06 RX ORDER — TADALAFIL 5 MG/1
5 TABLET ORAL DAILY
Qty: 90 TABLET | Refills: 3 | Status: SHIPPED | OUTPATIENT
Start: 2023-01-06

## 2023-01-06 RX ORDER — ATORVASTATIN CALCIUM 40 MG/1
TABLET, FILM COATED ORAL
Qty: 90 TABLET | Refills: 1 | Status: SHIPPED | OUTPATIENT
Start: 2023-01-06

## 2023-01-06 RX ORDER — TRIAMCINOLONE ACETONIDE 1 MG/G
CREAM TOPICAL
Qty: 45 G | Refills: 2 | Status: SHIPPED | OUTPATIENT
Start: 2023-01-06

## 2023-01-06 RX ORDER — LANCETS 30 GAUGE
1 EACH MISCELLANEOUS 3 TIMES DAILY
Qty: 300 EACH | Refills: 3 | Status: SHIPPED | OUTPATIENT
Start: 2023-01-06

## 2023-01-06 RX ORDER — OMEPRAZOLE 20 MG/1
20 CAPSULE, DELAYED RELEASE ORAL
Qty: 90 CAPSULE | Refills: 1 | Status: SHIPPED | OUTPATIENT
Start: 2023-01-06

## 2023-01-06 RX ORDER — LOSARTAN POTASSIUM 25 MG/1
25 TABLET ORAL 2 TIMES DAILY
Qty: 180 TABLET | Refills: 1 | Status: SHIPPED | OUTPATIENT
Start: 2023-01-06

## 2023-01-06 RX ORDER — METFORMIN HYDROCHLORIDE 750 MG/1
TABLET, EXTENDED RELEASE ORAL
Qty: 180 TABLET | Refills: 1 | Status: SHIPPED | OUTPATIENT
Start: 2023-01-06

## 2023-01-06 RX ORDER — GLUCOSAMINE HCL/CHONDROITIN SU 500-400 MG
1 CAPSULE ORAL
Qty: 300 STRIP | Refills: 3 | Status: SHIPPED | OUTPATIENT
Start: 2023-01-06

## 2023-01-06 RX ORDER — ACARBOSE 100 MG/1
TABLET ORAL
Qty: 90 TABLET | Refills: 1 | Status: SHIPPED | OUTPATIENT
Start: 2023-01-06

## 2023-01-06 RX ORDER — HYDROCHLOROTHIAZIDE 25 MG/1
25 TABLET ORAL DAILY
Qty: 90 TABLET | Refills: 1 | Status: SHIPPED | OUTPATIENT
Start: 2023-01-06

## 2023-01-06 ASSESSMENT — PATIENT HEALTH QUESTIONNAIRE - PHQ9
SUM OF ALL RESPONSES TO PHQ9 QUESTIONS 1 & 2: 0
2. FEELING DOWN, DEPRESSED OR HOPELESS: 0
1. LITTLE INTEREST OR PLEASURE IN DOING THINGS: 0
SUM OF ALL RESPONSES TO PHQ QUESTIONS 1-9: 0

## 2023-01-07 LAB
PROTEIN PROTEIN: 0.46 G/DL
PROTEIN PROTEIN: 457 MG/DL

## 2023-01-08 PROBLEM — E11.69 TYPE 2 DIABETES MELLITUS WITH OBESITY (HCC): Status: ACTIVE | Noted: 2023-01-08

## 2023-01-08 PROBLEM — E78.5 DM TYPE 2 WITH DIABETIC DYSLIPIDEMIA (HCC): Status: ACTIVE | Noted: 2023-01-08

## 2023-01-08 PROBLEM — E11.69 DM TYPE 2 WITH DIABETIC DYSLIPIDEMIA (HCC): Status: ACTIVE | Noted: 2023-01-08

## 2023-01-08 PROBLEM — E66.9 TYPE 2 DIABETES MELLITUS WITH OBESITY (HCC): Status: ACTIVE | Noted: 2023-01-08

## 2023-01-08 LAB — URINE CULTURE, ROUTINE: NORMAL

## 2023-01-08 ASSESSMENT — ENCOUNTER SYMPTOMS
COUGH: 1
ABDOMINAL PAIN: 0
SHORTNESS OF BREATH: 0

## 2023-01-08 NOTE — PROGRESS NOTES
Anamika Roach (:  1950) is a 67 y.o. male,Established patient, here for evaluation of the following chief complaint(s):  Diabetes, Leg Swelling (X 1 month), Hypertension, Shoulder Pain (Left for 3 months), and Cough (Chronic for years)         ASSESSMENT/PLAN:  1. Uncontrolled type 2 diabetes mellitus with hyperglycemia (HCC)  -     atorvastatin (LIPITOR) 40 MG tablet; Take 1 tablet by mouth once daily, Disp-90 tablet, R-1Normal  -     acarbose (PRECOSE) 100 MG tablet; TAKE ONE TABLET BY MOUTH THREE TIMES A DAY WITH A MEAL, Disp-90 tablet, R-1Normal  -     POCT microalbumin  -     Vitamin B12; Future  -     losartan (COZAAR) 25 MG tablet; Take 1 tablet by mouth 2 times daily, Disp-180 tablet, R-1Normal  -     metFORMIN (GLUCOPHAGE-XR) 750 MG extended release tablet; TAKE 2 TABLETS BY MOUTH ONCE DAILY WITH BREAKFAST, Disp-180 tablet, R-1Normal  -     Lancets MISC; 3 TIMES DAILY Starting 2023, Disp-300 each, R-3, NormalDx 250.02  -     blood glucose monitor strips; 1 strip by Other route 3 times daily (before meals) Freestyle Strips per patient. Dx: E11.65, Other, 3 TIMES DAILY BEFORE MEALS Starting 2023, Disp-300 strip, R-3, Normal  Improving. Continue current meds and continue to monitor home glucose readings. Recheck in 3 months. 2. Uncontrolled type 2 diabetes mellitus with hypoglycemia without coma (HCC)  -     acarbose (PRECOSE) 100 MG tablet; TAKE ONE TABLET BY MOUTH THREE TIMES A DAY WITH A MEAL, Disp-90 tablet, R-1Normal  -     POCT microalbumin  -     Vitamin B12; Future  -     losartan (COZAAR) 25 MG tablet;  Take 1 tablet by mouth 2 times daily, Disp-180 tablet, R-1Normal  -     metFORMIN (GLUCOPHAGE-XR) 750 MG extended release tablet; TAKE 2 TABLETS BY MOUTH ONCE DAILY WITH BREAKFAST, Disp-180 tablet, R-1Normal  -     insulin 70-30 (NOVOLIN 70/30 RELION) (70-30) 100 UNIT per ML injection vial; INJECT 25 UNITS SUBCUTANEOUSLY IN THE MORNING AND 10-15 UNITS NIGHTLY, Disp-20 mL, R-2Normal  Better now that he has decreased 70/30 insulin to 15 units nightly. Will continue. 3. Diabetes mellitus with coincident hypertension (HCC)  -     POCT glycosylated hemoglobin (Hb A1C)  -     acarbose (PRECOSE) 100 MG tablet; TAKE ONE TABLET BY MOUTH THREE TIMES A DAY WITH A MEAL, Disp-90 tablet, R-1Normal  -     POCT microalbumin  -     Vitamin B12; Future  -     hydroCHLOROthiazide (HYDRODIURIL) 25 MG tablet; Take 1 tablet by mouth daily, Disp-90 tablet, R-1Normal  -     metFORMIN (GLUCOPHAGE-XR) 750 MG extended release tablet; TAKE 2 TABLETS BY MOUTH ONCE DAILY WITH BREAKFAST, Disp-180 tablet, R-1Normal  4. DM type 2 with diabetic dyslipidemia (HCC)  -     atorvastatin (LIPITOR) 40 MG tablet; Take 1 tablet by mouth once daily, Disp-90 tablet, R-1Normal  -     acarbose (PRECOSE) 100 MG tablet; TAKE ONE TABLET BY MOUTH THREE TIMES A DAY WITH A MEAL, Disp-90 tablet, R-1Normal  -     POCT microalbumin  -     Vitamin B12; Future  -     metFORMIN (GLUCOPHAGE-XR) 750 MG extended release tablet; TAKE 2 TABLETS BY MOUTH ONCE DAILY WITH BREAKFAST, Disp-180 tablet, R-1Normal  5. Hyperlipidemia, unspecified hyperlipidemia type  -     POCT microalbumin  6. Type 2 diabetes mellitus with obesity (HCC)  -     acarbose (PRECOSE) 100 MG tablet; TAKE ONE TABLET BY MOUTH THREE TIMES A DAY WITH A MEAL, Disp-90 tablet, R-1Normal  -     POCT microalbumin  -     Vitamin B12; Future  -     metFORMIN (GLUCOPHAGE-XR) 750 MG extended release tablet; TAKE 2 TABLETS BY MOUTH ONCE DAILY WITH BREAKFAST, Disp-180 tablet, R-1Normal  7. Uncontrolled hypertension  -     POCT microalbumin  -     POCT Urinalysis no Micro  -     CBC with Auto Differential; Future  -     Comprehensive Metabolic Panel; Future  -     Lipid Panel; Future  -     TSH with Reflex; Future  -     hydroCHLOROthiazide (HYDRODIURIL) 25 MG tablet; Take 1 tablet by mouth daily, Disp-90 tablet, R-1Normal  -     losartan (COZAAR) 25 MG tablet;  Take 1 tablet by mouth 2 times daily, Disp-180 tablet, R-1Normal - Encouraged to increase this to twice daily every day. -     CATECHOLAMINES FREE URINE  8. Obesity, diabetes, and hypertension syndrome (HCC)  -     acarbose (PRECOSE) 100 MG tablet; TAKE ONE TABLET BY MOUTH THREE TIMES A DAY WITH A MEAL, Disp-90 tablet, R-1Normal  -     POCT microalbumin  -     Vitamin B12; Future  -     metFORMIN (GLUCOPHAGE-XR) 750 MG extended release tablet; TAKE 2 TABLETS BY MOUTH ONCE DAILY WITH BREAKFAST, Disp-180 tablet, R-1Normal  9. Mild nonproliferative diabetic retinopathy of both eyes associated with type 2 diabetes mellitus, macular edema presence unspecified (HCC)  -     acarbose (PRECOSE) 100 MG tablet; TAKE ONE TABLET BY MOUTH THREE TIMES A DAY WITH A MEAL, Disp-90 tablet, R-1Normal  -     POCT microalbumin  -     metFORMIN (GLUCOPHAGE-XR) 750 MG extended release tablet; TAKE 2 TABLETS BY MOUTH ONCE DAILY WITH BREAKFAST, Disp-180 tablet, R-1Normal  Continue regular follow up with ophthalmology. 10. Chronic left shoulder pain  -     XR SHOULDER LEFT (MIN 2 VIEWS); Future  I suspect he has rotator cuff tendonitis given normal strength. If x-ray is negative, will send for PT.   11. Elevated PSA  -     PSA, TOTAL AND FREE; Future  12. Chronic cough  -     omeprazole (PRILOSEC) 20 MG delayed release capsule; Take 1 capsule by mouth every morning (before breakfast), Disp-90 capsule, R-1Normal  Possibly from GERD. Trial of PPI. Call or return to clinic prn if these symptoms worsen or fail to improve as anticipated. 13. Gastroesophageal reflux disease without esophagitis  -     omeprazole (PRILOSEC) 20 MG delayed release capsule; Take 1 capsule by mouth every morning (before breakfast), Disp-90 capsule, R-1Normal  Stop Pepcid, start Prilosec instead. 14. Bilateral lower extremity edema  -     POCT Urinalysis no Micro  -     CBC with Auto Differential; Future  -     Comprehensive Metabolic Panel; Future  -     Lipid Panel;  Future  Concerning for nephrotic syndrome. Sending 24 hour urine for protein to assess further. 15. Hematuria, unspecified type  -     Culture, Urine  16. Proteinuria, unspecified type  -     PROTEIN ELECTROPHORESIS, URINE  Ordering 24 hour urine to assess for nephrotic syndrome given elevated urine protein, edema, and elevated BP. Return in about 3 months (around 4/6/2023) for Diabetes. Subjective   SUBJECTIVE/OBJECTIVE:  Chief Complaint   Patient presents with    Diabetes    Leg Swelling     X 1 month    Hypertension    Shoulder Pain     Left for 3 months    Cough     Chronic for years      Anamika Roach is a 67 y.o. male with uncontrolled diabetes with associated HTN and HLP here today for follow up. He has several concerns today. Reports continued issues with glucose dropping in the morning. He has been using Novolin N 70/30 BID, decreased evening dose to 15 units, morning dose is variable. He has noted less hypoglycemia in the AM after decreasing his dose. Hypertension:  Home blood pressure monitoring: Yes - runs high at times, maybe once or twice a week. Gets very tired and mood changes when BP goes up. He usually goes to bed. Has been taking 1 losartan most days, takes 2 when it is high which helps bring it back down, but does not stay on 2. Patient complains of new onset peripheral edema in the past month. This is a new problem since his last visit. Antihypertensive medication side effects: no medication side effects noted. Use of agents associated with hypertension: NSAIDS. Sodium (mmol/L)   Date Value   01/06/2023 144    BUN (mg/dL)   Date Value   01/06/2023 18    Glucose (mg/dL)   Date Value   01/06/2023 183 (H)      Potassium (mmol/L)   Date Value   01/06/2023 4.3    Creatinine (mg/dL)   Date Value   01/06/2023 1.3          Shoulder Pain: Patient complaints of left shoulder pain. This is evaluated as a personal injury. The pain is described as sharp.   The onset of the pain was sudden, starting about 3 months ago. The pain occurs continuously. Location is acromioclavicular joint, also deltoid. no history of dislocation. Symptoms are aggravated by all activities. Limited activities include: reaching. No swelling is reported. Patient is a  and he has not missed work as he is currently laid off. Review of Systems   Constitutional:  Negative for unexpected weight change. Respiratory:  Positive for cough (chronic for years). Negative for shortness of breath. Cardiovascular:  Positive for leg swelling. Negative for chest pain and palpitations. Gastrointestinal:  Negative for abdominal pain.        + heartburn, currently on Pepcid   Musculoskeletal:  Positive for arthralgias (left shoulder). Objective    Vitals:    23 0921 23 1021   BP: (!) 180/90 (!) 180/82   Site: Right Upper Arm Right Upper Arm   Position: Sitting Sitting   Cuff Size: Small Adult Small Adult   Pulse: 77    SpO2: 99%    Weight: 213 lb 3.2 oz (96.7 kg)       Physical Exam  Vitals and nursing note reviewed. Constitutional:       Appearance: Normal appearance. He is obese. HENT:      Head: Normocephalic and atraumatic. Right Ear: External ear normal.      Left Ear: External ear normal.   Cardiovascular:      Rate and Rhythm: Normal rate and regular rhythm. Heart sounds: Normal heart sounds, S1 normal and S2 normal.   Pulmonary:      Effort: Pulmonary effort is normal.      Breath sounds: Normal breath sounds. Abdominal:      General: Abdomen is flat. Palpations: Abdomen is soft. Musculoskeletal:      Left shoulder: Tenderness present. No swelling, deformity, effusion, bony tenderness or crepitus. Decreased range of motion. Normal strength. Normal pulse. Right lower le+ Pitting Edema present. Left lower le+ Pitting Edema present. Neurological:      Mental Status: He is alert.         Results for POC orders placed in visit on 01/06/23   POCT microalbumin   Result Value Ref Range    Microalb, Ur 150     Creatinine Ur POCT 100     Microalbumin Creatinine Ratio >300    POCT glycosylated hemoglobin (Hb A1C)   Result Value Ref Range    Hemoglobin A1C 7.9 %   POCT Urinalysis no Micro   Result Value Ref Range    Color, UA yellow     Clarity, UA cloudy     Glucose, UA POC neg     Bilirubin, UA neg     Ketones, UA neg     Spec Grav, UA 1.025     Blood, UA POC small     pH, UA 7.0     Protein, UA POC >=300     Urobilinogen, UA 0.2     Leukocytes, UA neg     Nitrite, UA neg        On this date 1/6/2023 I have spent 41 minutes reviewing previous notes, test results and face to face with the patient discussing the diagnosis and treatment plan, as well as documenting on the day of the visit. An electronic signature was used to authenticate this note.     --Jess Pablo MD

## 2023-01-11 DIAGNOSIS — R97.20 ELEVATED PSA, GREATER THAN OR EQUAL TO 20 NG/ML: Primary | ICD-10-CM

## 2023-01-11 DIAGNOSIS — R60.0 BILATERAL LOWER EXTREMITY EDEMA: Primary | ICD-10-CM

## 2023-01-11 DIAGNOSIS — I10 UNCONTROLLED HYPERTENSION: ICD-10-CM

## 2023-01-11 DIAGNOSIS — E11.65 UNCONTROLLED TYPE 2 DIABETES MELLITUS WITH HYPERGLYCEMIA (HCC): ICD-10-CM

## 2023-01-11 DIAGNOSIS — N04.9 NEPHROTIC SYNDROME: ICD-10-CM

## 2023-01-13 ENCOUNTER — TELEPHONE (OUTPATIENT)
Dept: FAMILY MEDICINE CLINIC | Age: 73
End: 2023-01-13

## 2023-01-13 NOTE — TELEPHONE ENCOUNTER
Patient called in stating his BP is running about 190/90, please advise.  Also, Can not get into nephrologist until Feb

## 2023-01-15 ASSESSMENT — ENCOUNTER SYMPTOMS
VOMITING: 0
SHORTNESS OF BREATH: 0
COUGH: 0
NAUSEA: 0
RHINORRHEA: 0
EYE PAIN: 0

## 2023-01-15 NOTE — PROGRESS NOTES
[facility]      Patient Name: Marcos Dacosta  Medical Record Number:  [id number]  Primary Care Physician:  Raphael Mejia MD  Date of Consultation: 1/16/2023    Chief Complaint:   Chief Complaint   Patient presents with    Follow-up     Patient is here today for a 6 month follow up and ear cleaning. Patient states he has no concerns or questions about his ears today. HISTORY OF PRESENT ILLNESS  Germania Wild is a(n) 67 y.o. male who presents with cerumen impaction and hearing loss. He is here today for cerumen removal and audiogram.  He denies any changes. His hearing has been stable from previous evaluation. He did bring with him hearing aids which he purchased through the mail. Interval history  Has been approximately 1 year since I last saw Terry. He states his ear has been clogged for several months. He is not overly concerned with his hearing at this point time. Interval History 7/11/2022  Jordy Leyva returns for cerumen management. He states that he has been having some fullness and feels that his left ear is more full than the right. He has had decrease in hearing with the wax buildup. He did get hearing aids but has not started using them yet    Interval History 1/16/2023  Jordy Leyva presents for wax management. He states both ears feel full. He states his hearing is slightly decreased from what it normally is. Patient Active Problem List   Diagnosis    Diabetes mellitus with coincident hypertension (HCC)    GERD (gastroesophageal reflux disease)    Hyperlipidemia    Erectile dysfunction    Low back pain    Hypotestosteronism    Diabetes type 2, uncontrolled    Mild nonproliferative diabetic retinopathy (HCC)    Diabetic retinopathy (HCC)    Sensorineural hearing loss, bilateral    Elevated PSA    Diabetic hypoglycemia (HCC)    Chronic cough    Severe obesity (BMI 35.0-39. 9) with comorbidity ([address]    DM type 2 with diabetic dyslipidemia ([address] Type 2 diabetes mellitus with obesity (HCC)     Past Surgical History:   Procedure Laterality Date    KNEE GANGLION SURGERY      left wrist and right anle    VASECTOMY       Family History   Problem Relation Age of Onset    Stroke Mother     Cancer Father     Diabetes Father     Diabetes Maternal Grandfather     Diabetes Paternal Grandmother      Social History     Socioeconomic History    Marital status:      Spouse name: Not on file    Number of children: Not on file    Years of education: Not on file    Highest education level: Not on file   Occupational History    Not on file   Tobacco Use    Smoking status: Never    Smokeless tobacco: Never   Vaping Use    Vaping Use: Never used   Substance and Sexual Activity    Alcohol use: No     Comment: rarely     Drug use: No    Sexual activity: Yes   Other Topics Concern    Not on file   Social History Narrative    Not on file     Social Determinants of Health     Financial Resource Strain: Low Risk     Difficulty of Paying Living Expenses: Not hard at all   Food Insecurity: No Food Insecurity    Worried About Running Out of Food in the Last Year: Never true    [address] in the Last Year: Never true   Transportation Needs: No Transportation Needs    Lack of Transportation (Medical): No    Lack of Transportation (Non-Medical): No   Physical Activity: Insufficiently Active    Days of Exercise per Week: 4 days    Minutes of Exercise per Session: 30 min   Stress: Not on file   Social Connections: Not on file   Intimate Partner Violence: Not on file   Housing Stability: Low Risk     Unable to Pay for Housing in the Last Year: No    Number of Places Lived in the Last Year: 1    Unstable Housing in the Last Year: No       DRUG/FOOD ALLERGIES: Lisinopril, Other, Toujeo solostar [insulin glargine], Viagra [sildenafil citrate], and Victoza [liraglutide]    CURRENT MEDICATIONS  Prior to Admission medications    Medication Sig Start Date End Date Taking?  Authorizing Provider   atorvastatin (LIPITOR) 40 MG tablet Take 1 tablet by mouth once daily 1/6/23  Yes Marlon Koehler MD   acarbose (PRECOSE) 100 MG tablet TAKE ONE TABLET BY MOUTH THREE TIMES A DAY WITH A MEAL 1/6/23  Yes Marlon Koehler MD   triamcinolone (KENALOG) 0.1 % cream Apply topically 2 times daily. 1/6/23  Yes Marlon Koehler MD   hydroCHLOROthiazide (HYDRODIURIL) 25 MG tablet Take 1 tablet by mouth daily 1/6/23  Yes Marlon Koehler MD   losartan (COZAAR) 25 MG tablet Take 1 tablet by mouth 2 times daily 1/6/23  Yes Marlon Koehler MD   tadalafil (CIALIS) 5 MG tablet Take 1 tablet by mouth daily 1/6/23  Yes Marlon Koehler MD   metFORMIN (GLUCOPHAGE-XR) 750 MG extended release tablet TAKE 2 TABLETS BY MOUTH ONCE DAILY WITH BREAKFAST 1/6/23  Yes Marlon Koehler MD   omeprazole (PRILOSEC) 20 MG delayed release capsule Take 1 capsule by mouth every morning (before breakfast) 1/6/23  Yes Marlon Koehler MD   insulin 70-30 (NOVOLIN 70/30 RELION) (70-30) 100 UNIT per ML injection vial INJECT 25 UNITS SUBCUTANEOUSLY IN THE MORNING AND 10-15 UNITS NIGHTLY 1/6/23  Yes Marlon Koehler MD   Lancets MISC 1 each by Other route 3 times daily Dx 250.02 1/6/23  Yes Marlon Koehler MD   blood glucose monitor strips 1 strip by Other route 3 times daily (before meals) Freestyle Strips per patient.  Dx: E11.65 1/6/23  Yes Marlon Koehler MD   Handicap Placard MISC by Does not apply route 9/8/22  Yes Marlon Koehler MD   famotidine (PEPCID) 20 MG tablet Take 1 tablet by mouth twice daily 6/21/22  Yes MARLIN Mead CNP   Insulin Syringe-Needle U-100 (INSULIN SYRINGE .5CC/31GX5/16\") 31G X 5/16\" 0.5 ML MISC USE 1 SYRINGE SUBCUTANEOUSLY 4 TIMES DAILY 4/6/22  Yes MARLIN Frazier CNP   montelukast (SINGULAIR) 10 MG tablet Take 1 tablet by mouth nightly 2/7/22  Yes Marlon Koehler MD   fluticasone (FLONASE) 50 MCG/ACT nasal spray Use 1 spray(s) in each nostril once daily 1/11/22  Yes Marlon Koehler MD   Blood Glucose Monitoring Suppl (FREESTYLE FREEDOM LITE) w/Device KIT 1 kit by Does not apply route daily 4/30/21  Yes Rafi Prescott MD   albuterol (PROVENTIL) (2.5 MG/3ML) 0.083% nebulizer solution Take 3 mLs by nebulization every 4 hours as needed for Wheezing or Shortness of Breath 3/11/21  Yes Rafi Prescott MD   Alcohol Swabs (ALCOHOL PADS) 70 % PADS 1 each by Does not apply route 2 times daily 3/11/21  Yes Rafi Prescott MD   etodolac (LODINE) 300 MG capsule Take 1 capsule by mouth every 8 hours  Patient taking differently: Take 300 mg by mouth in the morning and 300 mg at noon and 300 mg in the evening. As needed. 3/9/21  Yes Rafi Prescott MD   magnesium oxide (MAG-OX) 400 (241.3 Mg) MG TABS tablet Take 1 tablet by mouth daily 3/9/21  Yes Rafi Prescott MD   ciclopirox [facility]) 8 % solution Apply topically nightly. 3/9/21  Yes Rafi Prescott MD   Multiple Vitamins-Minerals (THERAPEUTIC MULTIVITAMIN-MINERALS) tablet Take 1 tablet by mouth daily   Yes Historical Provider, MD   albuterol sulfate (PROAIR RESPICLICK) 376 (90 BASE) MCG/ACT aerosol powder inhalation Inhale 1-2 puffs into the lungs every 4 hours as needed for Wheezing or Shortness of Breath 1/28/16  Yes Rafi Prescott MD   aspirin 81 MG chewable tablet Take 81 mg by mouth daily. OTC    Yes Historical Provider, MD   fish oil-omega-3 fatty acids 1000 MG capsule Take 2 g by mouth daily. OTC   Yes Historical Provider, MD       REVIEW OF SYSTEMS  The following systems were reviewed and revealed the following in addition to any already discussed in the HPI:    Review of Systems   Constitutional:  Negative for fatigue and fever. HENT:  Positive for hearing loss. Negative for congestion, ear pain, postnasal drip, rhinorrhea and sneezing. Eyes:  Negative for pain and visual disturbance. Respiratory:  Negative for cough and shortness of breath. Cardiovascular:  Negative for chest pain. Gastrointestinal:  Negative for nausea and vomiting. Endocrine: Negative.     Genitourinary: Negative. Musculoskeletal:  Negative for neck pain and neck stiffness. Skin:  Negative for rash. Neurological:  Negative for dizziness and headaches. PHYSICAL EXAM  BP (!) 190/89 (Site: Right Upper Arm, Position: Sitting)   Pulse 75   Temp 98.1 °F (36.7 °C) (Infrared)   Ht 5' 6\" (1.676 m)   Wt 213 lb (96.6 kg)   SpO2 99%   BMI 34.38 kg/m²     GENERAL: No Acute Distress, Alert and Oriented, no hoarseness  EYES: EOMI, Anti-icteric  NOSE: No epistaxis, nasal mucosa within normal limits, no purulent drainage  EARS: Normal external canal appearance, EAC patent with cerumen bilaterally  FACE: 1/6 House-Brackmann Scale, symmetric, sensation equal bilaterally  ORAL CAVITY: No masses or lesions palpated, uvula is midline, moist mucous membranes,   NECK: Normal range of motion, no thyromegaly, trachea is midline, no lymphadenopathy, no neck masses, no crepitus  CHEST: Normal respiratory effort, no retractions, breathing comfortably  SKIN: No rashes, normal appearing skin, no evidence of skin lesions/tumors    RADIOLOGY  Summary of findings:      PROCEDURE     Otomicroscopy and Cerumen Removal    An operating microscope was utilized to visualize the external auditory canals using a speculum. The external auditory canals where occluded with cerumen bilaterally. The cerumen was removed with instrumentation under microscopic evaluation. The bilateral tympanic membranes and ossicles are intact. No fluid visualized in the bilateral middle ears. ASSESSMENT/PLAN  Caroline Granados is a very pleasant 67 y.o. male with     1. Bilateral impacted cerumen  Cerumen was removed from bilateral ear canals under endoscopic visualization. Tympanic membranes intact bilaterally. - MA REMOVAL IMPACTED CERUMEN INSTRUMENTATION UNILAT    2. Sensorineural hearing loss (SNHL) of both ears  Continue to use hearing aids    He will follow-up with me in 6 months to a year or sooner if needed    Medical Decision Making:   The following items were considered in medical decision making:  Independent review of images  Review / order clinical lab tests  Review / order radiology tests  Decision to obtain old records

## 2023-01-16 ENCOUNTER — OFFICE VISIT (OUTPATIENT)
Dept: ENT CLINIC | Age: 73
End: 2023-01-16
Payer: MEDICARE

## 2023-01-16 VITALS
OXYGEN SATURATION: 99 % | SYSTOLIC BLOOD PRESSURE: 190 MMHG | BODY MASS INDEX: 34.23 KG/M2 | DIASTOLIC BLOOD PRESSURE: 89 MMHG | TEMPERATURE: 98.1 F | HEART RATE: 75 BPM | WEIGHT: 213 LBS | HEIGHT: 66 IN

## 2023-01-16 DIAGNOSIS — H61.23 BILATERAL IMPACTED CERUMEN: Primary | ICD-10-CM

## 2023-01-16 DIAGNOSIS — H90.3 SENSORINEURAL HEARING LOSS (SNHL) OF BOTH EARS: ICD-10-CM

## 2023-01-16 PROCEDURE — 1123F ACP DISCUSS/DSCN MKR DOCD: CPT | Performed by: STUDENT IN AN ORGANIZED HEALTH CARE EDUCATION/TRAINING PROGRAM

## 2023-01-16 PROCEDURE — 3017F COLORECTAL CA SCREEN DOC REV: CPT | Performed by: STUDENT IN AN ORGANIZED HEALTH CARE EDUCATION/TRAINING PROGRAM

## 2023-01-16 PROCEDURE — 3077F SYST BP >= 140 MM HG: CPT | Performed by: STUDENT IN AN ORGANIZED HEALTH CARE EDUCATION/TRAINING PROGRAM

## 2023-01-16 PROCEDURE — 99213 OFFICE O/P EST LOW 20 MIN: CPT | Performed by: STUDENT IN AN ORGANIZED HEALTH CARE EDUCATION/TRAINING PROGRAM

## 2023-01-16 PROCEDURE — G8427 DOCREV CUR MEDS BY ELIG CLIN: HCPCS | Performed by: STUDENT IN AN ORGANIZED HEALTH CARE EDUCATION/TRAINING PROGRAM

## 2023-01-16 PROCEDURE — G8484 FLU IMMUNIZE NO ADMIN: HCPCS | Performed by: STUDENT IN AN ORGANIZED HEALTH CARE EDUCATION/TRAINING PROGRAM

## 2023-01-16 PROCEDURE — 1036F TOBACCO NON-USER: CPT | Performed by: STUDENT IN AN ORGANIZED HEALTH CARE EDUCATION/TRAINING PROGRAM

## 2023-01-16 PROCEDURE — 69210 REMOVE IMPACTED EAR WAX UNI: CPT | Performed by: STUDENT IN AN ORGANIZED HEALTH CARE EDUCATION/TRAINING PROGRAM

## 2023-01-16 PROCEDURE — G8417 CALC BMI ABV UP PARAM F/U: HCPCS | Performed by: STUDENT IN AN ORGANIZED HEALTH CARE EDUCATION/TRAINING PROGRAM

## 2023-01-16 PROCEDURE — 3079F DIAST BP 80-89 MM HG: CPT | Performed by: STUDENT IN AN ORGANIZED HEALTH CARE EDUCATION/TRAINING PROGRAM

## 2023-01-16 RX ORDER — AMLODIPINE BESYLATE 5 MG/1
5 TABLET ORAL DAILY
Qty: 30 TABLET | Refills: 1 | Status: SHIPPED | OUTPATIENT
Start: 2023-01-16

## 2023-01-16 NOTE — TELEPHONE ENCOUNTER
Please - he needs to get the 24 hour urine done. I will send in amlodipine for now to help with blood pressure. It can be taken in addition to the losartan.

## 2023-01-17 ENCOUNTER — HOSPITAL ENCOUNTER (OUTPATIENT)
Dept: PHYSICAL THERAPY | Age: 73
Setting detail: THERAPIES SERIES
Discharge: HOME | End: 2023-01-17
Payer: MEDICARE

## 2023-01-17 PROCEDURE — 97161 PT EVAL LOW COMPLEX 20 MIN: CPT

## 2023-01-17 PROCEDURE — 97112 NEUROMUSCULAR REEDUCATION: CPT

## 2023-01-17 PROCEDURE — 97110 THERAPEUTIC EXERCISES: CPT

## 2023-01-17 NOTE — FLOWSHEET NOTE
3 [facility], [address] [place] [address] [address]  Phone: [phone]   Fax:     [phone]      Physical Therapy Treatment Note/ Progress Report:     Date:  2023    Patient Name:  Janell Soto :  1950  MRN: [id number]  Restrictions/Precautions:    Medical/Treatment Diagnosis Information:  Diagnosis: Chronic left shoulder pain M25.512  Treatment Diagnosis: Chronic left shoulder pain U88.510  Insurance/Certification information:  1PT Insurance Information: Medicare A/B  Physician Information:   Ericka Minor  Has the plan of care been signed (Y/N):        []  Yes  [x]  No     Date of Patient follow up with Physician:  3/9/23    Is this a Progress Report:     []  Yes  [x]  No     If Yes:  Date Range for reporting period:  Beginnin23 ------------ Endin23    Progress report will be due (10 Rx or 30 days whichever is less):      Recertification will be due (POC Duration  / 90 days whichever is less): 23      Visit # Insurance Allowable Auth Required   In Person 1 Med Nec []  Yes     []  No    Tele Health 0  []  Yes     []  No    Total 1       FOTO Score: QDASH 68%     Date assessed:  23      Latex Allergy:  [x]NO      []YES  Preferred Language for Healthcare:   [x]English       []other:    Pain level:  2-10/10     SUBJECTIVE:  See eval    OBJECTIVE: See eval  Observation:   Test measurements:      RESTRICTIONS/PRECAUTIONS:  HTN, DM II, HLD    Exercises/Interventions:   Therapeutic Ex (31816) Sets/sec Reps Notes/CUES HEP   Supine cane flexion 10s 10 vc    Supine serratus punch  10 vc    SLER ecc lower  10     Prone shoulder ext  10     Submax iso ER  10     Submax iso IR  10                                        Pt education 10 min  Reviewed posture, HEP with gradual progression, goals of PT, not to push through pain with ex or activity, use of ice- pt stated understanding    Manual Intervention (61736)                                                 NMR re-education (16709)   CUES NEEDED                                                                   Therapeutic Activity (11107)                                          PerfectServe access code:  [email or web address]           Therapeutic Exercise and NMR EXR  [x] (50348) Provided verbal/tactile cueing for activities related to strengthening, flexibility, endurance, ROM  for improvements in scapular, scapulothoracic and UE control with self care, reaching, carrying, lifting, house/yardwork, driving/computer work. [x] (23444) Provided verbal/tactile cueing for activities related to improving balance, coordination, kinesthetic sense, posture, motor skill, proprioception  to assist with  scapular, scapulothoracic and UE control with self care, reaching, carrying, lifting, house/yardwork, driving/computer work. Therapeutic Activities:    [x] (56007 or 64087) Provided verbal/tactile cueing for activities related to improving balance, coordination, kinesthetic sense, posture, motor skill, proprioception and motor activation to allow for proper function of scapular, scapulothoracic and UE control with self care, carrying, lifting, driving/computer work.      Home Exercise Program:    [x] (42579) Reviewed/Progressed HEP activities related to strengthening, flexibility, endurance, ROM of scapular, scapulothoracic and UE control with self care, reaching, carrying, lifting, house/yardwork, driving/computer work  [x] (39361) Reviewed/Progressed HEP activities related to improving balance, coordination, kinesthetic sense, posture, motor skill, proprioception of scapular, scapulothoracic and UE control with self care, reaching, carrying, lifting, house/yardwork, driving/computer work      Manual Treatments:  PROM / STM / Oscillations-Mobs:  G-I, II, III, IV (PA's, Inf., Post.)  [x] (50207) Provided manual therapy to mobilize soft tissue/joints of cervical/CT, scapular GHJ and UE for the purpose of modulating pain, promoting relaxation,  increasing ROM, reducing/eliminating soft tissue swelling/inflammation/restriction, improving soft tissue extensibility and allowing for proper ROM for normal function with self care, reaching, carrying, lifting, house/yardwork, driving/computer work    Modalities:     [] GAME READY (VASO)- for significant edema, swelling, pain control. Charges:  Timed Code Treatment Minutes: 25   Total Treatment Minutes:  38   BWC:  TE TIME:  NMR TIME:  MANUAL TIME:  UNTIMED MINUTES:  Medicare Total:           $100      [x] EVAL (LOW) 65101 (typically 20 minutes face-to-face)  [] EVAL (MOD) 04673 (typically 30 minutes face-to-face)  [] EVAL (HIGH) 64050 (typically 45 minutes face-to-face)  [] RE-EVAL     [x] VN(10396) x     [] IONTO  [] NMR (79620) x     [] VASO  [x] Manual (73370) x     [] Other:  [] TA x      [] Mech Traction (29707)  [] ES(attended) (01169)      [] ES (un) (28697):    ASSESSMENT:  See eval      GOALS:     Patient stated goal: Less pain. Therapist goals for Patient:   Short Term Goals: To be achieved in: 2 weeks  1. Independent in HEP and progression per patient tolerance, in order to prevent re-injury. [] Progressing: [] Met: [] Not Met: [] Adjusted     2. Patient will have a decrease in pain to facilitate improvement in movement, function, and ADLs as indicated by Functional Deficits. [] Progressing: [] Met: [] Not Met: [] Adjusted     Long Term Goals: To be achieved in: 12 weeks  1. FOTO score will match or exceed predicted score to assist with reaching prior level of function. [] Progressing: [] Met: [] Not Met: [] Adjusted     2. Patient will demonstrate increased AROM to 165 active elevation no increased symptoms  to allow for proper joint functioning as indicated by patients Functional Deficits. [] Progressing: [] Met: [] Not Met: [] Adjusted     3.  Patient will demonstrate an increase in Strength to 5/5 to allow for proper functional mobility as indicated by patients Functional Deficits. [] Progressing: [] Met: [] Not Met: [] Adjusted     4. Patient will return to functional activities beekeeping activities lifting 50lb without increased symptoms or restriction. [] Progressing: [] Met: [] Not Met: [] Adjusted     5. 80% decrease in pain (patient specific functional goal)    [] Progressing: [] Met: [] Not Met: [] Adjusted          Overall Progression Towards Functional goals/ Treatment Progress Update:  [] Patient is progressing as expected towards functional goals listed. [] Progression is slowed due to complexities/Impairments listed. [] Progression has been slowed due to co-morbidities. [x] Plan just implemented, too soon to assess goals progression <30days   [] Goals require adjustment due to lack of progress  [] Patient is not progressing as expected and requires additional follow up with physician  [] Other    Prognosis for POC: [x] Good [] Fair  [] Poor      Patient requires continued skilled intervention: [x] Yes  [] No    Treatment/Activity Tolerance:  [x] Patient able to complete treatment  [] Patient limited by fatigue  [] Patient limited by pain    [] Patient limited by other medical complications  [] Other:       PLAN: See eval  [] Continue per plan of care [] Alter current plan (see comments above)  [x] Plan of care initiated [] Hold pending MD visit [] Discharge    Electronically signed by:  José Perry PT    Note: If patient does not return for scheduled/ recommended follow up visits, this note will serve as a discharge from care along with most recent update on progress.

## 2023-01-17 NOTE — PLAN OF CARE
[facility], [address] [facility] [place], [address]  Phone: [phone]   Fax:     [phone]                                                       Physical Therapy Certification    Dear Anil Alonzo ,    We had the pleasure of evaluating the following patient for physical therapy services at [address] [place] [address]. A summary of our findings can be found in the initial assessment below. This includes our plan of care. If you have any questions or concerns regarding these findings, please do not hesitate to contact me at the office phone number checked above. Thank you for the referral.       Physician Signature:_______________________________Date:__________________  By signing above (or electronic signature), therapists plan is approved by physician      Patient: Wale Arriaga   : 1950   MRN: [id number]  Referring Physician:        Evaluation Date: 2023      Medical Diagnosis Information:  Diagnosis: Chronic left shoulder pain M25.512   Treatment Diagnosis: Chronic left shoulder pain M25.512                                         Insurance information: PT Insurance Information: Medicare A/B    Precautions/ Contra-indications: HTN, DM II, HLD    C-SSRS Triggered by Intake questionnaire (Past 2 wk assessment):   [x] No, Questionnaire did not trigger screening.   [] Yes, Patient intake triggered further evaluation      [] C-SSRS Screening completed  [] PCP notified via Plan of Care  [] Emergency services notified     Latex Allergy:  [x]NO      []YES  Preferred Language for Healthcare:   [x]English       []other:    SUBJECTIVE: Patient stated complaint: Pt reports insidious onset left shoulder pain gradual increase in symptoms over past 6 months to 1 year. 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 Stated initially both arms hurt, now left shoulder hurts. Pt LHD.  Denies numbness and tingling. XR neg for acute injury. Stated taking Lodine at times for anti flammatory. Relevant Medical History: HTN, DM II, HLD  FOTO Score: QDASH 68%    Pain Scale: 2-10/10  Easing factors: rest  Provocative factors: activity, reaching    Type: []Constant   [x]Intermittent  []Radiating []Localized []other:     Numbness/Tingling: denies    Occupation/School: , quit work 2 wks    Living Status/Prior Level of Function: Independent with ADLs and IADLs. OBJECTIVE:     CERV ROM     Cervical Flexion     Cervical Extension     Cervical SB     Cervical rotation          ROM Left Right   Shoulder Flex 160 p! 170   Shoulder Abd 90 p! 170   Shoulder ER Tp C5-6 To C5-g   Shoulder IR To L1-2 p! To T10                  Strength  Left Right   Shoulder Flex 4+/5 5/5   Shoulder Scap 4/5 5/5   Shoulder ER 4/5 5/5   Shoulder IR 4+/5 5/5               Reflexes/Sensation:    [x]Dermatomes/Myotomes intact    [x]Reflexes equal and normal bilaterally   []Other:    Joint mobility:     []Normal    []Hypo   []Hyper    Palpation: TTP anterior lateral shoulder    Functional Mobility/Transfers: I with transfers    Posture: slight rounded shoulders    Bandages/Dressings/Incisions: NA    Gait: (include devices/WB status): WNL     Orthopedic Special Tests:                        [x] Patient history, allergies, meds reviewed. Medical chart reviewed. See intake form. Review Of Systems (ROS):  [x]Performed Review of systems (Integumentary, CardioPulmonary, Neurological) by intake and observation. Intake form has been scanned into medical record. Patient has been instructed to contact their primary care physician regarding ROS issues if not already being addressed at this time.       Co-morbidities/Complexities (which will affect course of rehabilitation):    []None           Arthritic conditions   []Rheumatoid arthritis (M05.9)  []Osteoarthritis (M19.91)   Cardiovascular conditions   [x]Hypertension (I10)  [x]Hyperlipidemia (E78.5)  []Angina pectoris (I20)  []Atherosclerosis (I70)   Musculoskeletal conditions   []Disc pathology   []Congenital spine pathologies   []Prior surgical intervention  []Osteoporosis (M81.8)  []Osteopenia (M85.8)   Endocrine conditions   []Hypothyroid (E03.9)  []Hyperthyroid Gastrointestinal conditions   []Constipation (Q80.47)   Metabolic conditions   [x]Morbid obesity (E66.01)  [x]Diabetes type 1(E10.65) or 2 (E11.65)   []Neuropathy (G60.9)     Pulmonary conditions   []Asthma (J45)  []Coughing   []COPD (J44.9)   Psychological Disorders  []Anxiety (F41.9)  []Depression (F32.9)   []Other:   []Other:          Barriers to/and or personal factors that will affect rehab potential:              [x]Age  []Sex              []Motivation/Lack of Motivation                        [x]Co-Morbidities              []Cognitive Function, education/learning barriers              []Environmental, home barriers              []profession/work barriers  []past PT/medical experience  []other:  Justification:       Falls Risk Assessment (30 days):    [x] Falls Risk assessed and no intervention required.   [] Falls Risk assessed and Patient requires intervention due to being higher risk   TUG score (>12s at risk):     [] Falls education provided, including       G-Codes:       ASSESSMENT:    Functional Impairments   []Noted spinal or UE joint hypomobility   []Noted spinal or UE joint hypermobility   [x]Decreased UE functional ROM   [x]Decreased UE functional strength   []Abnormal reflexes/sensation/myotomal/dermatomal deficits   [x]Decreased RC/scapular/core strength and neuromuscular control   []other:      Functional Activity Limitations (from functional questionnaire and intake)   [x]Reduced ability to tolerate prolonged functional positions   [x]Reduced ability or difficulty with changes of positions or transfers between positions   [x]Reduced ability to maintain good posture and demonstrate good body mechanics with sitting, bending, and lifting   [x] Reduced ability or tolerance with driving and/or computer work   [x]Reduced ability to sleep   [x]Reduced ability to perform lifting, reaching, carrying tasks   [x]Reduced ability to tolerate impact through UE   [x]Reduced ability to reach behind back   [x]Reduced ability to  or hold objects   [x]Reduced ability to throw or toss an object   []other:    Participation Restrictions   [x]Reduced participation in self care activities   [x]Reduced participation in home management activities   []Reduced participation in work activities   []Reduced participation in social activities. []Reduced participation in sport/recreation activities. Classification:   []Signs/symptoms consistent with post-surgical status including decreased ROM, strength and function.   []Signs/symptoms consistent with joint sprain/strain   []Signs/symptoms consistent with shoulder impingement   [x]Signs/symptoms consistent with shoulder/elbow/wrist tendinopathy   []Signs/symptoms consistent with Rotator cuff tear   []Signs/symptoms consistent with labral tear   []Signs/symptoms consistent with postural dysfunction    []Signs/symptoms consistent with Glenohumeral IR Deficit - <45 degrees   []Signs/symptoms consistent with facet dysfunction of cervical/thoracic spine    []Signs/symptoms consistent with pathology which may benefit from Dry needling     []other:     Prognosis/Rehab Potential:      []Excellent   [x]Good    []Fair   []Poor    Tolerance of evaluation/treatment:    []Excellent   [x]Good    []Fair   []Poor    Physical Therapy Evaluation Complexity Justification  [x] A history of present problem with:  [] no personal factors and/or comorbidities that impact the plan of care;  []1-2 personal factors and/or comorbidities that impact the plan of care  [x]3 personal factors and/or comorbidities that impact the plan of care  [x] An examination of body systems using standardized tests and measures addressing any of the following: body structures and functions (impairments), activity limitations, and/or participation restrictions;:  [] a total of 1-2 or more elements   [] a total of 3 or more elements   [x] a total of 4 or more elements   [x] A clinical presentation with:  [x] stable and/or uncomplicated characteristics   [] evolving clinical presentation with changing characteristics  [] unstable and unpredictable characteristics;   [x] Clinical decision making of [x] low, [] moderate, [] high complexity using standardized patient assessment instrument and/or measurable assessment of functional outcome. [x] EVAL (LOW) 99093 (typically 20 minutes face-to-face)  [] EVAL (MOD) 38619 (typically 30 minutes face-to-face)  [] EVAL (HIGH) 11144 (typically 45 minutes face-to-face)  [] RE-EVAL     PLAN:  Frequency/Duration:  1-2 days per week for 12 Weeks:  INTERVENTIONS:  [x] Therapeutic exercise including: strength training, ROM, for Upper extremity and core   [x]  NMR activation and proprioception for UE, scap and Core   [x] Manual therapy as indicated for shoulder, scapula and spine to include: Dry Needling/IASTM, STM, PROM, Gr I-IV mobilizations, manipulation. [x] Modalities as needed that may include: thermal agents, E-stim, Biofeedback, US, iontophoresis as indicated  [x] Patient education on joint protection, postural re-education, activity modification, progression of HEP. HEP instruction: Refer to [address] [place] [address] code and exercises on the 1st visit treatment note    GOALS:  Patient stated goal: Less pain. Therapist goals for Patient:   Short Term Goals: To be achieved in: 2 weeks  1. Independent in HEP and progression per patient tolerance, in order to prevent re-injury. [] Progressing: [] Met: [] Not Met: [] Adjusted     2. Patient will have a decrease in pain to facilitate improvement in movement, function, and ADLs as indicated by Functional Deficits.   [] Progressing: [] Met: [] Not Met: [] Adjusted     Long Term Goals: To be achieved in: 12 weeks  1. FOTO score will match or exceed predicted score to assist with reaching prior level of function. [] Progressing: [] Met: [] Not Met: [] Adjusted     2. Patient will demonstrate increased AROM to 165 active elevation no increased symptoms  to allow for proper joint functioning as indicated by patients Functional Deficits. [] Progressing: [] Met: [] Not Met: [] Adjusted     3. Patient will demonstrate an increase in Strength to 5/5 to allow for proper functional mobility as indicated by patients Functional Deficits. [] Progressing: [] Met: [] Not Met: [] Adjusted     4. Patient will return to functional activities beekeeping activities lifting 50lb without increased symptoms or restriction.    [] Progressing: [] Met: [] Not Met: [] Adjusted     5. 80% decrease in pain (patient specific functional goal)    [] Progressing: [] Met: [] Not Met: [] Adjusted      Electronically signed by:  Yvan Castillo, PT

## 2023-01-19 ENCOUNTER — TELEPHONE (OUTPATIENT)
Dept: FAMILY MEDICINE CLINIC | Age: 73
End: 2023-01-19

## 2023-01-19 DIAGNOSIS — R80.9 PROTEINURIA, UNSPECIFIED TYPE: ICD-10-CM

## 2023-01-19 LAB
24HR URINE VOLUME (ML): 1375 ML
CREATININE 24 HOUR URINE: 1.7 G/24HR (ref 0.6–2.5)
PROTEIN 24 HOUR URINE: 4.41 G/24HR

## 2023-01-19 NOTE — TELEPHONE ENCOUNTER
Terry's wife called in upset. She stated that on Tuesday Dr. Aidan Nieto sent in a new BP medication for Terry as his blood pressures have been extremely high. She stated that the medication was sent to [facility] instead of [facility]. She said she called the office and said it was sent to the wrong pharmacy and was told multiple times it would be corrected and staff was working on it. She stated that after most of the day she got [facility] and [facility] to talk to each other and get his medication. Now today they have spent over an hour in our office waiting to drop off the 24 hour urine jug specimen. I assured Luc May that I would figure out what is happening and where the break down is. I asked an MA to ask the lab if they could take the 24 hour urine as the patient had been waiting. I told Luc May historically we have patients drop this off at the hospital but I would make sure this was taken care of and ensure all staff are aware of the protocol. She was upset because the longer Terry waited the higher his blood pressure went. She states the  staff told him they could not help that he would have to sign in for labs even though he was only dropping off a jug. I apologized and advised I would follow up to see what is happening. Luc May thanked me and the call was ended. I did discover that the lab was backed up due to a new system process. Will review 24 hour urine process with lab and staff to ensure I have information. The patients urine was collected and process here in the office to be sent out to the main lab.      TRENT to PCP and Sherrlyn Olszewski

## 2023-01-20 RX ORDER — TORSEMIDE 20 MG/1
20 TABLET ORAL DAILY
Qty: 30 TABLET | Refills: 0 | Status: SHIPPED | OUTPATIENT
Start: 2023-01-20

## 2023-01-24 ENCOUNTER — HOSPITAL ENCOUNTER (OUTPATIENT)
Dept: PHYSICAL THERAPY | Age: 73
Setting detail: THERAPIES SERIES
Discharge: HOME | End: 2023-01-24
Payer: MEDICARE

## 2023-01-24 PROCEDURE — 97140 MANUAL THERAPY 1/> REGIONS: CPT

## 2023-01-24 PROCEDURE — 97110 THERAPEUTIC EXERCISES: CPT

## 2023-01-24 NOTE — FLOWSHEET NOTE
[facility], [address] [place] [address] [address]  Phone: [phone]   Fax:     [phone]      Physical Therapy Treatment Note/ Progress Report:     Date:  2023    Patient Name:  Leighton Bautista :  1950  MRN: [id number]  Restrictions/Precautions:    Medical/Treatment Diagnosis Information:  Diagnosis: Chronic left shoulder pain M25.512  Treatment Diagnosis: Chronic left shoulder pain L30.096  Insurance/Certification information:  1PT Insurance Information: Medicare A/B  Physician Information:   Angelina Campbell  Has the plan of care been signed (Y/N):        [x]  Yes  []  No     Date of Patient follow up with Physician:  3/9/23    Is this a Progress Report:     []  Yes  [x]  No     If Yes:  Date Range for reporting period:  Beginnin23 ------------ Endin23    Progress report will be due (10 Rx or 30 days whichever is less):      Recertification will be due (POC Duration  / 90 days whichever is less): 23      Visit # Insurance Allowable Auth Required   In Person 2 Med Nec []  Yes     []  No    Tele Health 0  []  Yes     []  No    Total 2       FOTO Score: QDASH 68%     Date assessed:  23      Latex Allergy:  [x]NO      []YES  Preferred Language for Healthcare:   [x]English       []other:    Pain level:  2-8/10     SUBJECTIVE:   Pt stated does feel a little bit better than it did.     OBJECTIVE: See eval  Observation:   Test measurements:      RESTRICTIONS/PRECAUTIONS:  HTN, DM II, HLD    Exercises/Interventions:   Therapeutic Ex (87515) Sets/sec Reps Notes/CUES HEP   Supine cane flexion 10s 10 vc    Supine serratus punch 2 10 vc    SLER ecc lower 2 10     Prone shoulder ext 2 10     Submax iso ER 2     Submax iso IR  TB ER  TB IR  TB ext 2  2  2  2 10  10  10     Pulleys 5 min                                  Pt education 10 min  Reviewed posture, HEP with gradual progression, goals of PT, not to push through pain with ex or activity, use of ice- pt stated understanding    Manual Intervention (17411)       PROM, STM 8 min                                         NMR re-education (45073)   CUES NEEDED                                                                   Therapeutic Activity (40928)                                          [employer] access code:  [email or web address]           Therapeutic Exercise and NMR EXR  [x] (46293) Provided verbal/tactile cueing for activities related to strengthening, flexibility, endurance, ROM  for improvements in scapular, scapulothoracic and UE control with self care, reaching, carrying, lifting, house/yardwork, driving/computer work.    [x] (28073) Provided verbal/tactile cueing for activities related to improving balance, coordination, kinesthetic sense, posture, motor skill, proprioception  to assist with  scapular, scapulothoracic and UE control with self care, reaching, carrying, lifting, house/yardwork, driving/computer work.    Therapeutic Activities:    [x] (50998 or 50401) Provided verbal/tactile cueing for activities related to improving balance, coordination, kinesthetic sense, posture, motor skill, proprioception and motor activation to allow for proper function of scapular, scapulothoracic and UE control with self care, carrying, lifting, driving/computer work.     Home Exercise Program:    [x] (80875) Reviewed/Progressed HEP activities related to strengthening, flexibility, endurance, ROM of scapular, scapulothoracic and UE control with self care, reaching, carrying, lifting, house/yardwork, driving/computer work  [x] (57284) Reviewed/Progressed HEP activities related to improving balance, coordination, kinesthetic sense, posture, motor skill, proprioception of scapular, scapulothoracic and UE control with self care, reaching, carrying, lifting, house/yardwork, driving/computer work      Manual Treatments:  PROM / STM / Oscillations-Mobs:  G-I, II, III, IV (PA's,  Inf., Post.)  [x] (31162) Provided manual therapy to mobilize soft tissue/joints of cervical/CT, scapular GHJ and UE for the purpose of modulating pain, promoting relaxation,  increasing ROM, reducing/eliminating soft tissue swelling/inflammation/restriction, improving soft tissue extensibility and allowing for proper ROM for normal function with self care, reaching, carrying, lifting, house/yardwork, driving/computer work    Modalities:     [] GAME READY (VASO)- for significant edema, swelling, pain control. Charges:  Timed Code Treatment Minutes: 30   Total Treatment Minutes:  30   BWC:  TE TIME:  NMR TIME:  MANUAL TIME:  UNTIMED MINUTES:  Medicare Total:           $160      [] EVAL (LOW) 74397 (typically 20 minutes face-to-face)  [] EVAL (MOD) 17880 (typically 30 minutes face-to-face)  [] EVAL (HIGH) 94549 (typically 45 minutes face-to-face)  [] RE-EVAL     [x] LD(36263) x     [] IONTO  [] NMR (94547) x     [] VASO  [x] Manual (47239) x     [] Other:  [] TA x      [] Mech Traction (42331)  [] ES(attended) (61437)      [] ES (un) (83152):    ASSESSMENT:   Good tolerance of manual therapy and there ex. Pt fatigues quickly with ex. No complaints at conclusion. ADded to HEP. GOALS:     Patient stated goal: Less pain. Therapist goals for Patient:   Short Term Goals: To be achieved in: 2 weeks  1. Independent in HEP and progression per patient tolerance, in order to prevent re-injury. [] Progressing: [] Met: [] Not Met: [] Adjusted     2. Patient will have a decrease in pain to facilitate improvement in movement, function, and ADLs as indicated by Functional Deficits. [] Progressing: [] Met: [] Not Met: [] Adjusted     Long Term Goals: To be achieved in: 12 weeks  1. FOTO score will match or exceed predicted score to assist with reaching prior level of function. [] Progressing: [] Met: [] Not Met: [] Adjusted     2.  Patient will demonstrate increased AROM to 165 active elevation no increased symptoms to allow for proper joint functioning as indicated by patients Functional Deficits. [] Progressing: [] Met: [] Not Met: [] Adjusted     3. Patient will demonstrate an increase in Strength to 5/5 to allow for proper functional mobility as indicated by patients Functional Deficits. [] Progressing: [] Met: [] Not Met: [] Adjusted     4. Patient will return to functional activities beekeeping activities lifting 50lb without increased symptoms or restriction. [] Progressing: [] Met: [] Not Met: [] Adjusted     5. 80% decrease in pain (patient specific functional goal)    [] Progressing: [] Met: [] Not Met: [] Adjusted          Overall Progression Towards Functional goals/ Treatment Progress Update:  [] Patient is progressing as expected towards functional goals listed. [] Progression is slowed due to complexities/Impairments listed. [] Progression has been slowed due to co-morbidities. [x] Plan just implemented, too soon to assess goals progression <30days   [] Goals require adjustment due to lack of progress  [] Patient is not progressing as expected and requires additional follow up with physician  [] Other    Prognosis for POC: [x] Good [] Fair  [] Poor      Patient requires continued skilled intervention: [x] Yes  [] No    Treatment/Activity Tolerance:  [x] Patient able to complete treatment  [] Patient limited by fatigue  [] Patient limited by pain    [] Patient limited by other medical complications  [] Other:       PLAN: See eval  [x] Continue per plan of care [] Alter current plan (see comments above)  [] Plan of care initiated [] Hold pending MD visit [] Discharge    Electronically signed by:  Rui Napoles PT    Note: If patient does not return for scheduled/ recommended follow up visits, this note will serve as a discharge from care along with most recent update on progress.

## 2023-01-30 ENCOUNTER — HOSPITAL ENCOUNTER (OUTPATIENT)
Age: 73
Discharge: HOME | End: 2023-01-30
Payer: MEDICARE

## 2023-01-30 PROCEDURE — 87390 HIV-1 AG IA: CPT

## 2023-01-30 PROCEDURE — 86162 COMPLEMENT TOTAL (CH50): CPT

## 2023-01-30 PROCEDURE — 86038 ANTINUCLEAR ANTIBODIES: CPT

## 2023-01-30 PROCEDURE — 86702 HIV-2 ANTIBODY: CPT

## 2023-01-30 PROCEDURE — 84443 ASSAY THYROID STIM HORMONE: CPT

## 2023-01-30 PROCEDURE — 83883 ASSAY NEPHELOMETRY NOT SPEC: CPT

## 2023-01-30 PROCEDURE — 86334 IMMUNOFIX E-PHORESIS SERUM: CPT

## 2023-01-30 PROCEDURE — 84155 ASSAY OF PROTEIN SERUM: CPT

## 2023-01-30 PROCEDURE — 86780 TREPONEMA PALLIDUM: CPT

## 2023-01-30 PROCEDURE — 86704 HEP B CORE ANTIBODY TOTAL: CPT

## 2023-01-30 PROCEDURE — 82306 VITAMIN D 25 HYDROXY: CPT

## 2023-01-30 PROCEDURE — 82595 ASSAY OF CRYOGLOBULIN: CPT

## 2023-01-30 PROCEDURE — 86255 FLUORESCENT ANTIBODY SCREEN: CPT

## 2023-01-30 PROCEDURE — 36415 COLL VENOUS BLD VENIPUNCTURE: CPT

## 2023-01-30 PROCEDURE — 86701 HIV-1ANTIBODY: CPT

## 2023-01-30 PROCEDURE — 84165 PROTEIN E-PHORESIS SERUM: CPT

## 2023-01-31 ENCOUNTER — HOSPITAL ENCOUNTER (OUTPATIENT)
Dept: PHYSICAL THERAPY | Age: 73
Setting detail: THERAPIES SERIES
Discharge: HOME | End: 2023-01-31
Payer: MEDICARE

## 2023-01-31 LAB
ANTI-NUCLEAR ANTIBODY (ANA): NEGATIVE
HIV AG/AB: NORMAL
HIV ANTIGEN: NORMAL
HIV-1 ANTIBODY: NORMAL
HIV-2 AB: NORMAL
KAPPA, FREE LIGHT CHAINS, SERUM: 46.06 MG/L (ref 3.3–19.4)
KAPPA/LAMBDA RATIO: 1.68 (ref 0.26–1.65)
KAPPA/LAMBDA TEST COMMENT: ABNORMAL
LAMBDA, FREE LIGHT CHAINS, SERUM: 27.41 MG/L (ref 5.71–26.3)
TOTAL SYPHILLIS IGG/IGM: NORMAL
TSH REFLEX: 0.93 UIU/ML (ref 0.27–4.2)
VITAMIN D 25-HYDROXY: 34.6 NG/ML

## 2023-01-31 NOTE — FLOWSHEET NOTE
[facility] [address] [facility], [address] [place] [address] [address]  Phone: [phone]   Fax:     [phone]    Physical Therapy  Cancellation/No-show Note  Patient Name:  Elena Black  :  1950   Date:  2023    Cancelled visits to date: 1  No-shows to date: 0    For today's appointment patient:  [x]  Cancelled  []  Rescheduled appointment  []  No-show     Reason given by patient:  [x]  Patient ill  []  Conflicting appointment  []  No transportation    []  Conflict with work  []  No reason given  []  Other:     Comments:      Phone call information:   []  Phone call made today to patient at am/pm at the number provided:      []  Patient answered, conversation as follows:    []  Patient did not answer, message left as follows:  [x]  Phone call not needed - pt contacted us to cancel and provided reason for cancellation.      Electronically signed by:  Mohsen Tang, PT, PT

## 2023-02-01 LAB
ALBUMIN SERPL-MCNC: 3.5 G/DL (ref 3.1–4.9)
ALPHA-1-GLOBULIN: 0.3 G/DL (ref 0.2–0.4)
ALPHA-2-GLOBULIN: 1 G/DL (ref 0.4–1.1)
BETA GLOBULIN: 1.1 G/DL (ref 0.9–1.6)
GAMMA GLOBULIN: 0.9 G/DL (ref 0.6–1.8)
TOTAL PROTEIN: 6.8 G/DL (ref 6.4–8.2)

## 2023-02-02 LAB
COMPLEMENT ACTIVITY, TOTAL TURBIDIMETRIC: >95 U/ML (ref 38.7–89.9)
SPE/IFE INTERPRETATION: NORMAL

## 2023-02-03 LAB — MISCELLANEOUS LAB TEST ORDER: NORMAL

## 2023-02-05 LAB — CRYOGLOBULIN, QUALITATIVE: NORMAL

## 2023-02-07 ENCOUNTER — HOSPITAL ENCOUNTER (OUTPATIENT)
Dept: PHYSICAL THERAPY | Age: 73
Setting detail: THERAPIES SERIES
Discharge: HOME | End: 2023-02-07
Payer: MEDICARE

## 2023-02-07 PROCEDURE — 97016 VASOPNEUMATIC DEVICE THERAPY: CPT

## 2023-02-07 PROCEDURE — 97140 MANUAL THERAPY 1/> REGIONS: CPT

## 2023-02-07 PROCEDURE — 97110 THERAPEUTIC EXERCISES: CPT

## 2023-02-07 NOTE — FLOWSHEET NOTE
[facility], [address] [place] [address] [address]  Phone: [phone]   Fax:     [phone]      Physical Therapy Treatment Note/ Progress Report:     Date:  2023    Patient Name:  Noah Zheng :  1950  MRN: [id number]  Restrictions/Precautions:    Medical/Treatment Diagnosis Information:  Diagnosis: Chronic left shoulder pain M25.512  Treatment Diagnosis: Chronic left shoulder pain N56.511  Insurance/Certification information:  1PT Insurance Information: Medicare A/B  Physician Information:   Leo Saab  Has the plan of care been signed (Y/N):        [x]  Yes  []  No     Date of Patient follow up with Physician:  3/9/23    Is this a Progress Report:     []  Yes  [x]  No     If Yes:  Date Range for reporting period:  Beginnin23 ------------ Endin23    Progress report will be due (10 Rx or 30 days whichever is less):      Recertification will be due (POC Duration  / 90 days whichever is less): 23      Visit # Insurance Allowable Auth Required   In Person 3 Med Nec []  Yes     []  No    Tele Health 0  []  Yes     []  No    Total 3       FOTO Score: QDASH 68%     Date assessed:  23      Latex Allergy:  [x]NO      []YES  Preferred Language for Healthcare:   [x]English       []other:    Pain level:  2-8/10     SUBJECTIVE:   Pt stated still hurts, stated has still been doing a lot of activity.      OBJECTIVE: See eval  Observation:   Test measurements:      RESTRICTIONS/PRECAUTIONS:  HTN, DM II, HLD    Exercises/Interventions:   Therapeutic Ex (54013) Sets/sec Reps Notes/CUES HEP   Supine cane flexion 10s 10 vc    Supine serratus punch 2 10 vc    SLER ecc lower 2# 2 10     Prone shoulder ext 2# 2 10     Submax iso ER 2     Submax iso IR  TB ER  TB IR  TB ext  TB tricep ext  TB row 2  3  3  3  3  3 10  10  10  10  10   Blue, vc    Pulleys 5 min                                  Pt education 10 min  Reviewed posture, HEP with gradual progression, goals of PT, not to push through pain with ex or activity, use of ice- pt stated understanding    Manual Intervention (18726)       PROM, STM, grade I/II jt mobs 10 min                                         NMR re-education (19185)   CUES NEEDED                                                                   Therapeutic Activity (16157)                                          [employer] access code:  [email or web address]           Therapeutic Exercise and NMR EXR  [x] (52039) Provided verbal/tactile cueing for activities related to strengthening, flexibility, endurance, ROM  for improvements in scapular, scapulothoracic and UE control with self care, reaching, carrying, lifting, house/yardwork, driving/computer work. [x] (13381) Provided verbal/tactile cueing for activities related to improving balance, coordination, kinesthetic sense, posture, motor skill, proprioception  to assist with  scapular, scapulothoracic and UE control with self care, reaching, carrying, lifting, house/yardwork, driving/computer work. Therapeutic Activities:    [x] (59353 or 61171) Provided verbal/tactile cueing for activities related to improving balance, coordination, kinesthetic sense, posture, motor skill, proprioception and motor activation to allow for proper function of scapular, scapulothoracic and UE control with self care, carrying, lifting, driving/computer work.      Home Exercise Program:    [x] (89923) Reviewed/Progressed HEP activities related to strengthening, flexibility, endurance, ROM of scapular, scapulothoracic and UE control with self care, reaching, carrying, lifting, house/yardwork, driving/computer work  [x] (34606) Reviewed/Progressed HEP activities related to improving balance, coordination, kinesthetic sense, posture, motor skill, proprioception of scapular, scapulothoracic and UE control with self care, reaching, carrying, lifting, house/yardwork, driving/computer work      Manual Treatments:  PROM / STM / Oscillations-Mobs:  G-I, II, III, IV (PA's, Inf., Post.)  [x] (12324) Provided manual therapy to mobilize soft tissue/joints of cervical/CT, scapular GHJ and UE for the purpose of modulating pain, promoting relaxation,  increasing ROM, reducing/eliminating soft tissue swelling/inflammation/restriction, improving soft tissue extensibility and allowing for proper ROM for normal function with self care, reaching, carrying, lifting, house/yardwork, driving/computer work    Modalities:     [x] GAME READY (VASO)- for significant edema, swelling, pain control. Charges:  Timed Code Treatment Minutes: 30   Total Treatment Minutes:  40   BWC:  TE TIME:  NMR TIME:  MANUAL TIME:  UNTIMED MINUTES:  Medicare Total:           $260      [] EVAL (LOW) 41605 (typically 20 minutes face-to-face)  [] EVAL (MOD) 99105 (typically 30 minutes face-to-face)  [] EVAL (HIGH) 81244 (typically 45 minutes face-to-face)  [] RE-EVAL     [x] COLEMAN(18947) x     [] IONTO  [] NMR (03875) x     [x] VASO  [x] Manual (64177) x     [] Other:  [] TA x      [] Mech Traction (99090)  [] ES(attended) (89410)      [] ES (un) (58615):    ASSESSMENT:   Good tolerance of manual therapy and there ex. Pt fatigues quickly with ex. No complaints at conclusion, stated still soreness. GOALS:     Patient stated goal: Less pain. Therapist goals for Patient:   Short Term Goals: To be achieved in: 2 weeks  1. Independent in HEP and progression per patient tolerance, in order to prevent re-injury. [] Progressing: [] Met: [] Not Met: [] Adjusted     2. Patient will have a decrease in pain to facilitate improvement in movement, function, and ADLs as indicated by Functional Deficits. [] Progressing: [] Met: [] Not Met: [] Adjusted     Long Term Goals: To be achieved in: 12 weeks  1. FOTO score will match or exceed predicted score to assist with reaching prior level of function.    [] Progressing: [] Met: [] Not Met: [] Adjusted     2. Patient will demonstrate increased AROM to 165 active elevation no increased symptoms  to allow for proper joint functioning as indicated by patients Functional Deficits. [] Progressing: [] Met: [] Not Met: [] Adjusted     3. Patient will demonstrate an increase in Strength to 5/5 to allow for proper functional mobility as indicated by patients Functional Deficits. [] Progressing: [] Met: [] Not Met: [] Adjusted     4. Patient will return to functional activities beekeeping activities lifting 50lb without increased symptoms or restriction. [] Progressing: [] Met: [] Not Met: [] Adjusted     5. 80% decrease in pain (patient specific functional goal)    [] Progressing: [] Met: [] Not Met: [] Adjusted          Overall Progression Towards Functional goals/ Treatment Progress Update:  [] Patient is progressing as expected towards functional goals listed. [] Progression is slowed due to complexities/Impairments listed. [] Progression has been slowed due to co-morbidities. [x] Plan just implemented, too soon to assess goals progression <30days   [] Goals require adjustment due to lack of progress  [] Patient is not progressing as expected and requires additional follow up with physician  [] Other    Prognosis for POC: [x] Good [] Fair  [] Poor      Patient requires continued skilled intervention: [x] Yes  [] No    Treatment/Activity Tolerance:  [x] Patient able to complete treatment  [] Patient limited by fatigue  [] Patient limited by pain    [] Patient limited by other medical complications  [] Other:       PLAN: See eval  [x] Continue per plan of care [] Alter current plan (see comments above)  [] Plan of care initiated [] Hold pending MD visit [] Discharge    Electronically signed by:  Zheng Deluca PT    Note: If patient does not return for scheduled/ recommended follow up visits, this note will serve as a discharge from care along with most recent update on progress.

## 2023-02-14 ENCOUNTER — HOSPITAL ENCOUNTER (OUTPATIENT)
Dept: PHYSICAL THERAPY | Age: 73
Setting detail: THERAPIES SERIES
Discharge: HOME | End: 2023-02-14
Payer: MEDICARE

## 2023-02-14 PROCEDURE — 97110 THERAPEUTIC EXERCISES: CPT

## 2023-02-14 PROCEDURE — 97140 MANUAL THERAPY 1/> REGIONS: CPT

## 2023-02-14 PROCEDURE — 97016 VASOPNEUMATIC DEVICE THERAPY: CPT

## 2023-02-14 NOTE — PROGRESS NOTES
3 [facility], [address] [place] [address] [address]  Phone: [phone]   Fax:     [phone]      Physical Therapy Treatment Note/ Progress Report:     Date: 2023          Patient Name; :  Eunice Perkins; 1950   Dx:   Chronic left shoulder pain M25.512     Physician:  Tristen Trujillo        Total PT Visits: 4     Measures Previous Current   Pain (0-10) 2-10 2-7   Disability % 68 68   ROM           ROM Left Right   Shoulder Flex 160 p! 170   Shoulder Abd 90 p! 170   Shoulder ER Tp C5-6 To C5-g   Shoulder IR To L1-2 p! To T10    ROM Left Right   Shoulder Flex 165 p! 170   Shoulder Abd 90 p! 170   Shoulder ER Tp C5-6 To C5-g   Shoulder IR To T 10p! To T10      Strength      Strength  Left Right   Shoulder Flex 4+/5 5/5   Shoulder Scap 4/5 5/5   Shoulder ER 4/5 5/5   Shoulder IR 4+/5 5/5    Strength  Left Right   Shoulder Flex 4+/5 5/5   Shoulder Scap 4+/5 5/5   Shoulder ER 4+/5 5/5   Shoulder IR 4+/5 5/5             Specific Functional Improvements & Impressions:  Very gradually progressing ROM and strength. Good tolerance of manual therapy and there ex. Pt fatigues quickly with ex. No complaints at conclusion, stated still soreness. Stated does feel that shoulder is feeling some improvement however does have mm soreness.      Plan & Recommendations:  [x] Continue rehabilitation due to objective improvement and continued functional deficits with frequency and duration: 1x/wk 4 wks  [] Progress toward  []GAP, []Work Conditioning, []Independent HEP   [] Discharge due to   [] All goals achieved, [] Maximized \"medical necessity\" [] No subjective or objective improvements      Electronically signed by:  Adelaida Mojica, PT  Therapy Plan of Care Re-Certification  This patient has been re-evaluated for physical therapy services and for therapy to continue, Medicare, Medicaid and other insurances require periodic physician review of the treatment plan. Please review the above re-evaluation and verify that you agree with plan of care as established above by signing the attached document and return it to our office or note changes to established plan below  [] Follow treatment plan as above [] Discontinue physical therapy  [] Change plan to:                                 __________________________________________________    Physician Signature:____________________________________ Date:____________  By signing above, therapists plan is approved by physician    If you have any questions or concerns, please don't hesitate to call. Thank you for your referral.     Date:  2023    Patient Name:  Domenica Bowman :  1950  MRN: [id number]  Restrictions/Precautions:    Medical/Treatment Diagnosis Information:  Diagnosis: Chronic left shoulder pain M25.512  Treatment Diagnosis: Chronic left shoulder pain I94.327  Insurance/Certification information:  1PT Insurance Information: Medicare A/B  Physician Information:   Aj Sanchez  Has the plan of care been signed (Y/N):        [x]  Yes  []  No     Date of Patient follow up with Physician:  3/9/23    Is this a Progress Report:     []  Yes  [x]  No     If Yes:  Date Range for reporting period:  Beginnin23 ------------ Endin23    Progress report will be due (10 Rx or 30 days whichever is less):      Recertification will be due (POC Duration  / 90 days whichever is less): 23      Visit # Insurance Allowable Auth Required   In Person 4 Med Nec []  Yes     []  No    Tele Health 0  []  Yes     []  No    Total 4       FOTO Score: QDASH  68%    Date assessed:  23     Latex Allergy:  [x]NO      []YES  Preferred Language for Healthcare:   [x]English       []other:    Pain level:  2-7/10     SUBJECTIVE:   Pt stated still hurts, stated has still been doing a lot of activity.      OBJECTIVE: See eval  Observation:   Test measurements:      RESTRICTIONS/PRECAUTIONS:  HTN, DM II, HLD    Exercises/Interventions:   Therapeutic Ex (13900) Sets/sec Reps Notes/CUES HEP   Supine cane flexion 10s 10 vc    Supine serratus punch 2 10 vc    SLER ecc lower 2# 2 10     Prone shoulder ext 2# 2 10     Submax iso ER 2     Submax iso IR  TB ER  TB IR  TB ext  TB tricep ext  TB row  Wall push ups 2  3  3  3  3  3  3 10  10  10  10  10  10   Blue, vc    Pulleys 5 min                                  Pt education 10 min  Reviewed posture, HEP with gradual progression, goals of PT, not to push through pain with ex or activity, use of ice- pt stated understanding    Manual Intervention (45929)       PROM, STM, grade I/II jt mobs 10 min                                         NMR re-education (94302)   CUES NEEDED                                                                   Therapeutic Activity (14891)                                          CodeBaby access code:  [email or web address]           Therapeutic Exercise and NMR EXR  [x] (58507) Provided verbal/tactile cueing for activities related to strengthening, flexibility, endurance, ROM  for improvements in scapular, scapulothoracic and UE control with self care, reaching, carrying, lifting, house/yardwork, driving/computer work. [x] (74450) Provided verbal/tactile cueing for activities related to improving balance, coordination, kinesthetic sense, posture, motor skill, proprioception  to assist with  scapular, scapulothoracic and UE control with self care, reaching, carrying, lifting, house/yardwork, driving/computer work. Therapeutic Activities:    [x] (52712 or 06539) Provided verbal/tactile cueing for activities related to improving balance, coordination, kinesthetic sense, posture, motor skill, proprioception and motor activation to allow for proper function of scapular, scapulothoracic and UE control with self care, carrying, lifting, driving/computer work.      Home Exercise Program:    [x] (74768) Reviewed/Progressed HEP activities related to strengthening, flexibility, endurance, ROM of scapular, scapulothoracic and UE control with self care, reaching, carrying, lifting, house/yardwork, driving/computer work  [x] (81883) Reviewed/Progressed HEP activities related to improving balance, coordination, kinesthetic sense, posture, motor skill, proprioception of scapular, scapulothoracic and UE control with self care, reaching, carrying, lifting, house/yardwork, driving/computer work      Manual Treatments:  PROM / STM / Oscillations-Mobs:  G-I, II, III, IV (PA's, Inf., Post.)  [x] (69804) Provided manual therapy to mobilize soft tissue/joints of cervical/CT, scapular GHJ and UE for the purpose of modulating pain, promoting relaxation,  increasing ROM, reducing/eliminating soft tissue swelling/inflammation/restriction, improving soft tissue extensibility and allowing for proper ROM for normal function with self care, reaching, carrying, lifting, house/yardwork, driving/computer work    Modalities:     [x] GAME READY (VASO)- for significant edema, swelling, pain control. Charges:  Timed Code Treatment Minutes: 30   Total Treatment Minutes:  40   BWC:  TE TIME:  NMR TIME:  MANUAL TIME:  UNTIMED MINUTES:  Medicare Total:           $360      [] EVAL (LOW) 04891 (typically 20 minutes face-to-face)  [] EVAL (MOD) 63157 (typically 30 minutes face-to-face)  [] EVAL (HIGH) 61002 (typically 45 minutes face-to-face)  [] RE-EVAL     [x] SS(83873) x     [] IONTO  [] NMR (20558) x     [x] VASO  [x] Manual (89523) x     [] Other:  [] TA x      [] Mech Traction (36597)  [] ES(attended) (45523)      [] ES (un) (01034):    ASSESSMENT:   See above. GOALS:     Patient stated goal: Less pain. Therapist goals for Patient:   Short Term Goals: To be achieved in: 2 weeks  1. Independent in HEP and progression per patient tolerance, in order to prevent re-injury. [x] Progressing: [] Met: [] Not Met: [] Adjusted     2.  Patient will have a decrease in pain to facilitate improvement in movement, function, and ADLs as indicated by Functional Deficits. [x] Progressing: [] Met: [] Not Met: [] Adjusted     Long Term Goals: To be achieved in: 12 weeks  1. FOTO score will match or exceed predicted score to assist with reaching prior level of function. [x] Progressing: [] Met: [] Not Met: [] Adjusted     2. Patient will demonstrate increased AROM to 165 active elevation no increased symptoms  to allow for proper joint functioning as indicated by patients Functional Deficits. [x] Progressing: [] Met: [] Not Met: [] Adjusted     3. Patient will demonstrate an increase in Strength to 5/5 to allow for proper functional mobility as indicated by patients Functional Deficits. [x] Progressing: [] Met: [] Not Met: [] Adjusted     4. Patient will return to functional activities beekeeping activities lifting 50lb without increased symptoms or restriction. [x] Progressing: [] Met: [] Not Met: [] Adjusted     5. 80% decrease in pain (patient specific functional goal)    [x] Progressing: [] Met: [] Not Met: [] Adjusted          Overall Progression Towards Functional goals/ Treatment Progress Update:  [x] Patient is progressing as expected towards functional goals listed. [] Progression is slowed due to complexities/Impairments listed. [] Progression has been slowed due to co-morbidities.   [] Plan just implemented, too soon to assess goals progression <30days   [] Goals require adjustment due to lack of progress  [] Patient is not progressing as expected and requires additional follow up with physician  [] Other    Prognosis for POC: [x] Good [] Fair  [] Poor      Patient requires continued skilled intervention: [x] Yes  [] No    Treatment/Activity Tolerance:  [x] Patient able to complete treatment  [] Patient limited by fatigue  [x] Patient limited by pain    [] Patient limited by other medical complications  [] Other:       PLAN: See eval  [x] Continue per plan of care [] Alter current plan (see comments above)  [] Plan of care initiated [] Hold pending MD visit [] Discharge    Electronically signed by:  Bill Valdez PT    Note: If patient does not return for scheduled/ recommended follow up visits, this note will serve as a discharge from care along with most recent update on progress.

## 2023-02-16 ENCOUNTER — HOSPITAL ENCOUNTER (OUTPATIENT)
Dept: ULTRASOUND IMAGING | Age: 73
Discharge: HOME | End: 2023-02-16
Payer: MEDICARE

## 2023-02-16 ENCOUNTER — HOSPITAL ENCOUNTER (OUTPATIENT)
Dept: VASCULAR LAB | Age: 73
Discharge: HOME | End: 2023-02-16
Payer: MEDICARE

## 2023-02-16 ENCOUNTER — HOSPITAL ENCOUNTER (OUTPATIENT)
Age: 73
Discharge: HOME | End: 2023-02-16
Payer: MEDICARE

## 2023-02-16 DIAGNOSIS — I10 ESSENTIAL HYPERTENSION: ICD-10-CM

## 2023-02-16 DIAGNOSIS — N17.9 ACUTE KIDNEY INJURY SUPERIMPOSED ON CKD (HCC): ICD-10-CM

## 2023-02-16 DIAGNOSIS — R80.9 PROTEINURIA, UNSPECIFIED TYPE: ICD-10-CM

## 2023-02-16 DIAGNOSIS — N18.9 ACUTE KIDNEY INJURY SUPERIMPOSED ON CKD (HCC): ICD-10-CM

## 2023-02-16 LAB
ALBUMIN SERPL-MCNC: 4 G/DL (ref 3.4–5)
ANION GAP SERPL CALCULATED.3IONS-SCNC: 9 MMOL/L (ref 3–16)
BILIRUBIN URINE: NEGATIVE
BLOOD, URINE: NEGATIVE
BUN BLDV-MCNC: 24 MG/DL (ref 7–20)
CALCIUM SERPL-MCNC: 9.8 MG/DL (ref 8.3–10.6)
CHLORIDE BLD-SCNC: 101 MMOL/L (ref 99–110)
CLARITY: CLEAR
CO2: 30 MMOL/L (ref 21–32)
COLOR: YELLOW
CREAT SERPL-MCNC: 1.5 MG/DL (ref 0.8–1.3)
CREATININE URINE: 94.9 MG/DL (ref 39–259)
GFR SERPL CREATININE-BSD FRML MDRD: 49 ML/MIN/{1.73_M2}
GLUCOSE BLD-MCNC: 119 MG/DL (ref 70–99)
GLUCOSE URINE: >=1000 MG/DL
HYALINE CASTS: ABNORMAL /LPF (ref 0–2)
KETONES, URINE: NEGATIVE MG/DL
LEUKOCYTE ESTERASE, URINE: NEGATIVE
MICROSCOPIC EXAMINATION: YES
MUCUS: ABNORMAL /LPF
NITRITE, URINE: NEGATIVE
PH UA: 6 (ref 5–8)
PHOSPHORUS: 5.1 MG/DL (ref 2.5–4.9)
POTASSIUM SERPL-SCNC: 4.6 MMOL/L (ref 3.5–5.1)
PROTEIN PROTEIN: 134 MG/DL
PROTEIN UA: 100 MG/DL
PROTEIN/CREAT RATIO: 1.4 MG/DL
RBC UA: ABNORMAL /HPF (ref 0–4)
SODIUM BLD-SCNC: 140 MMOL/L (ref 136–145)
SPECIFIC GRAVITY UA: 1.02 (ref 1–1.03)
URINE TYPE: ABNORMAL
UROBILINOGEN, URINE: 0.2 E.U./DL
WBC UA: ABNORMAL /HPF (ref 0–5)

## 2023-02-16 PROCEDURE — 80069 RENAL FUNCTION PANEL: CPT

## 2023-02-16 PROCEDURE — 76770 US EXAM ABDO BACK WALL COMP: CPT

## 2023-02-16 PROCEDURE — 81001 URINALYSIS AUTO W/SCOPE: CPT

## 2023-02-16 PROCEDURE — 84156 ASSAY OF PROTEIN URINE: CPT

## 2023-02-16 PROCEDURE — 82570 ASSAY OF URINE CREATININE: CPT

## 2023-02-16 PROCEDURE — 93975 VASCULAR STUDY: CPT

## 2023-02-16 PROCEDURE — 36415 COLL VENOUS BLD VENIPUNCTURE: CPT

## 2023-02-21 ENCOUNTER — HOSPITAL ENCOUNTER (OUTPATIENT)
Dept: PHYSICAL THERAPY | Age: 73
Setting detail: THERAPIES SERIES
Discharge: HOME | End: 2023-02-21
Payer: MEDICARE

## 2023-02-21 PROCEDURE — 97140 MANUAL THERAPY 1/> REGIONS: CPT

## 2023-02-21 PROCEDURE — 97016 VASOPNEUMATIC DEVICE THERAPY: CPT

## 2023-02-21 PROCEDURE — 97110 THERAPEUTIC EXERCISES: CPT

## 2023-02-21 PROCEDURE — 97112 NEUROMUSCULAR REEDUCATION: CPT

## 2023-02-21 NOTE — FLOWSHEET NOTE
[address] [facility], [address] [place] [address] [address]  Phone: [phone]   Fax:     [phone]      Physical Therapy Treatment Note/ Progress Report:       Date:  2023    Patient Name:  Luh Noyola :  1950  MRN: [id number]  Restrictions/Precautions:    Medical/Treatment Diagnosis Information:  Diagnosis: Chronic left shoulder pain M25.512  Treatment Diagnosis: Chronic left shoulder pain U14.296  Insurance/Certification information:  1PT Insurance Information: Medicare A/B  Physician Information:   Shashank Farris  Has the plan of care been signed (Y/N):        [x]  Yes  []  No     Date of Patient follow up with Physician:  3/9/23    Is this a Progress Report:     []  Yes  [x]  No     If Yes:  Date Range for reporting period:  Beginnin23 ------------ Endin23    Progress report will be due (10 Rx or 30 days whichever is less): 7/10/69     Recertification will be due (POC Duration  / 90 days whichever is less): 23      Visit # Insurance Allowable Auth Required   In Person 5 Med Nec []  Yes     []  No    Tele Health 0  []  Yes     []  No    Total 5       FOTO Score: QDASH  68%    Date assessed:  23     Latex Allergy:  [x]NO      []YES  Preferred Language for Healthcare:   [x]English       []other:    Pain level:  2-7/10     SUBJECTIVE:   Pt stated still hurts, stated has still been doing a lot of activity.      OBJECTIVE: See eval  Observation:   Test measurements:      RESTRICTIONS/PRECAUTIONS:  HTN, DM II, HLD    Exercises/Interventions:   Therapeutic Ex (48390) Sets/sec Reps Notes/CUES HEP   Supine cane flexion 10s 10 vc    Supine serratus punch 2 10 vc    SLER ecc lower 2#  25     Prone shoulder ext 2# 25     Submax iso ER 2     Submax iso IR  TB ER  TB IR  TB ext  TB tricep ext  TB row  Wall push ups  Ball rolls on wall 2  3  3  3  3  3  3 10  10  10  10  10  10  30s ea   Blue, vc  Blue, vc  Blk, vc Pulleys 5 min                                  Pt education 10 min  Reviewed posture, HEP with gradual progression, goals of PT, not to push through pain with ex or activity, use of ice- pt stated understanding    Manual Intervention (81906)       PROM, STM, grade I/II jt mobs 10 min                                         NMR re-education (36498)   CUES NEEDED                                                                   Therapeutic Activity (27504)                                          Nitro access code:  [email or web address]           Therapeutic Exercise and NMR EXR  [x] (60158) Provided verbal/tactile cueing for activities related to strengthening, flexibility, endurance, ROM  for improvements in scapular, scapulothoracic and UE control with self care, reaching, carrying, lifting, house/yardwork, driving/computer work. [x] (97137) Provided verbal/tactile cueing for activities related to improving balance, coordination, kinesthetic sense, posture, motor skill, proprioception  to assist with  scapular, scapulothoracic and UE control with self care, reaching, carrying, lifting, house/yardwork, driving/computer work. Therapeutic Activities:    [x] (75350 or 27027) Provided verbal/tactile cueing for activities related to improving balance, coordination, kinesthetic sense, posture, motor skill, proprioception and motor activation to allow for proper function of scapular, scapulothoracic and UE control with self care, carrying, lifting, driving/computer work.      Home Exercise Program:    [x] (23498) Reviewed/Progressed HEP activities related to strengthening, flexibility, endurance, ROM of scapular, scapulothoracic and UE control with self care, reaching, carrying, lifting, house/yardwork, driving/computer work  [x] (59355) Reviewed/Progressed HEP activities related to improving balance, coordination, kinesthetic sense, posture, motor skill, proprioception of scapular, scapulothoracic and UE control with self care, reaching, carrying, lifting, house/yardwork, driving/computer work      Manual Treatments:  PROM / STM / Oscillations-Mobs:  G-I, II, III, IV (PA's, Inf., Post.)  [x] (15723) Provided manual therapy to mobilize soft tissue/joints of cervical/CT, scapular GHJ and UE for the purpose of modulating pain, promoting relaxation,  increasing ROM, reducing/eliminating soft tissue swelling/inflammation/restriction, improving soft tissue extensibility and allowing for proper ROM for normal function with self care, reaching, carrying, lifting, house/yardwork, driving/computer work    Modalities:     [x] GAME READY (VASO)- for significant edema, swelling, pain control. Charges:  Timed Code Treatment Minutes: 38   Total Treatment Minutes:  48   BWC:  TE TIME:  NMR TIME:  MANUAL TIME:  UNTIMED MINUTES:  Medicare Total:           $460      [] EVAL (LOW) 94905 (typically 20 minutes face-to-face)  [] EVAL (MOD) 71477 (typically 30 minutes face-to-face)  [] EVAL (HIGH) 29895 (typically 45 minutes face-to-face)  [] RE-EVAL     [x] BH(51312) x     [] IONTO  [x] NMR (44135) x     [x] VASO  [x] Manual (75403) x     [] Other:  [] TA x      [] Mech Traction (33302)  [] ES(attended) (39326)      [] ES (un) (57257):    ASSESSMENT:    Very gradually progressing ROM and strength. Good tolerance of manual therapy and there ex. Pt fatigues quickly with ex. No complaints at conclusion, stated still soreness. Stated does feel that shoulder is feeling some improvement however does have mm soreness. GOALS:     Patient stated goal: Less pain. Therapist goals for Patient:   Short Term Goals: To be achieved in: 2 weeks  1. Independent in HEP and progression per patient tolerance, in order to prevent re-injury. [x] Progressing: [] Met: [] Not Met: [] Adjusted     2. Patient will have a decrease in pain to facilitate improvement in movement, function, and ADLs as indicated by Functional Deficits.   [x] Progressing: [] Met: [] Not Met: [] Adjusted     Long Term Goals: To be achieved in: 12 weeks  1. FOTO score will match or exceed predicted score to assist with reaching prior level of function. [x] Progressing: [] Met: [] Not Met: [] Adjusted     2. Patient will demonstrate increased AROM to 165 active elevation no increased symptoms  to allow for proper joint functioning as indicated by patients Functional Deficits. [x] Progressing: [] Met: [] Not Met: [] Adjusted     3. Patient will demonstrate an increase in Strength to 5/5 to allow for proper functional mobility as indicated by patients Functional Deficits. [x] Progressing: [] Met: [] Not Met: [] Adjusted     4. Patient will return to functional activities beekeeping activities lifting 50lb without increased symptoms or restriction. [x] Progressing: [] Met: [] Not Met: [] Adjusted     5. 80% decrease in pain (patient specific functional goal)    [x] Progressing: [] Met: [] Not Met: [] Adjusted          Overall Progression Towards Functional goals/ Treatment Progress Update:  [x] Patient is progressing as expected towards functional goals listed. [] Progression is slowed due to complexities/Impairments listed. [] Progression has been slowed due to co-morbidities.   [] Plan just implemented, too soon to assess goals progression <30days   [] Goals require adjustment due to lack of progress  [] Patient is not progressing as expected and requires additional follow up with physician  [] Other    Prognosis for POC: [x] Good [] Fair  [] Poor      Patient requires continued skilled intervention: [x] Yes  [] No    Treatment/Activity Tolerance:  [x] Patient able to complete treatment  [] Patient limited by fatigue  [x] Patient limited by pain    [] Patient limited by other medical complications  [] Other:       PLAN: See eval  [x] Continue per plan of care [] Alter current plan (see comments above)  [] Plan of care initiated [] Hold pending MD visit [] Discharge    Electronically signed by: Rogers Barnett, PT    Note: If patient does not return for scheduled/ recommended follow up visits, this note will serve as a discharge from care along with most recent update on progress.

## 2023-02-23 RX ORDER — TORSEMIDE 20 MG/1
TABLET ORAL
Qty: 30 TABLET | Refills: 0 | OUTPATIENT
Start: 2023-02-23

## 2023-02-23 NOTE — TELEPHONE ENCOUNTER
Refill Request     CONFIRM preferrred pharmacy with the patient. If Mail Order Rx - Pend for 90 day refill. Last Seen: Last Seen Department: 1/6/2023  Last Seen by PCP: 1/6/2023    Last Written: 1/20/2023, 330, 0 refills    If no future appointment scheduled, route STAFF MESSAGE with patient name to the [facility] for scheduling. Next Appointment:   Future Appointments   Date Time Provider Som Lucas   2/28/2023  9:30 AM Leonarda [facility]   3/9/2023  2:45 PM MD HANDY Cagle [facility] [place] [facility]       Message sent to [address] [place] [address] to schedule appt with patient?   NO      Requested Prescriptions     Pending Prescriptions Disp Refills    torsemide (DEMADEX) 20 MG tablet [Pharmacy Med Name: TORSEMIDE 20 MG TABLET] 30 tablet 0     Sig: TAKE ONE TABLET BY MOUTH DAILY

## 2023-02-28 ENCOUNTER — HOSPITAL ENCOUNTER (OUTPATIENT)
Dept: PHYSICAL THERAPY | Age: 73
Setting detail: THERAPIES SERIES
Discharge: HOME | End: 2023-02-28
Payer: MEDICARE

## 2023-02-28 PROCEDURE — 97110 THERAPEUTIC EXERCISES: CPT

## 2023-02-28 PROCEDURE — 97112 NEUROMUSCULAR REEDUCATION: CPT

## 2023-02-28 PROCEDURE — 97016 VASOPNEUMATIC DEVICE THERAPY: CPT

## 2023-02-28 PROCEDURE — 97140 MANUAL THERAPY 1/> REGIONS: CPT

## 2023-02-28 NOTE — FLOWSHEET NOTE
[facility] [facility] [facility], [address] [place] [address] [address]  Phone: [phone]   Fax:     [phone]      Physical Therapy Treatment Note/ Progress Report:       Date:  2023    Patient Name:  Bindu Grajeda :  1950  MRN: [id number]  Restrictions/Precautions:    Medical/Treatment Diagnosis Information:  Diagnosis: Chronic left shoulder pain M25.512  Treatment Diagnosis: Chronic left shoulder pain I01.065  Insurance/Certification information:  1PT Insurance Information: Medicare A/B  Physician Information:   Miguel Vickers  Has the plan of care been signed (Y/N):        [x]  Yes  []  No     Date of Patient follow up with Physician:  3/9/23    Is this a Progress Report:     []  Yes  [x]  No     If Yes:  Date Range for reporting period:  Beginnin23 ------------ Endin23    Progress report will be due (10 Rx or 30 days whichever is less):      Recertification will be due (POC Duration  / 90 days whichever is less): 23      Visit # Insurance Allowable Auth Required   In Person [id number] []  Yes     []  No    Tele Health 0  []  Yes     []  No    Total 6       FOTO Score: QDASH  68%    Date assessed:  23     Latex Allergy:  [x]NO      []YES  Preferred Language for Healthcare:   [x]English       []other:    Pain level:  2-7/10     SUBJECTIVE:   Pt stated still hurts however does have some better days.     OBJECTIVE: See eval  Observation:   Test measurements:      RESTRICTIONS/PRECAUTIONS:  HTN, DM II, HLD    Exercises/Interventions:   Therapeutic Ex (90663) Sets/sec Reps Notes/CUES HEP   Supine cane flexion 10s 10 vc    Supine serratus punch 2 10 vc    SLER ecc lower 2#  30     Prone shoulder ext 2# 30     Submax iso ER 2     Submax iso IR  TB ER  TB IR  TB ext  TB tricep ext  TB row  Wall push ups  Ball rolls on wall 2  3  3  3  3  3  3 10  10  10  10  10  10  30s ea   Blue, vc  Blue, vc  Blk, vc  Blk, vc  Blk, vc    Pulleys 5 min                                  Pt education 10 min  Reviewed posture, HEP with gradual progression, goals of PT, not to push through pain with ex or activity, use of ice- pt stated understanding    Manual Intervention (32991)       PROM, STM, grade I/II jt mobs 10 min                                         NMR re-education (50947)   CUES NEEDED                                                                   Therapeutic Activity (51612)                                          [employer] access code:  [email or web address]           Therapeutic Exercise and NMR EXR  [x] (97477) Provided verbal/tactile cueing for activities related to strengthening, flexibility, endurance, ROM  for improvements in scapular, scapulothoracic and UE control with self care, reaching, carrying, lifting, house/yardwork, driving/computer work.    [x] (46476) Provided verbal/tactile cueing for activities related to improving balance, coordination, kinesthetic sense, posture, motor skill, proprioception  to assist with  scapular, scapulothoracic and UE control with self care, reaching, carrying, lifting, house/yardwork, driving/computer work.    Therapeutic Activities:    [x] (73041 or 10590) Provided verbal/tactile cueing for activities related to improving balance, coordination, kinesthetic sense, posture, motor skill, proprioception and motor activation to allow for proper function of scapular, scapulothoracic and UE control with self care, carrying, lifting, driving/computer work.     Home Exercise Program:    [x] (57488) Reviewed/Progressed HEP activities related to strengthening, flexibility, endurance, ROM of scapular, scapulothoracic and UE control with self care, reaching, carrying, lifting, house/yardwork, driving/computer work  [x] (84773) Reviewed/Progressed HEP activities related to improving balance, coordination, kinesthetic sense, posture, motor skill, proprioception of scapular, scapulothoracic and UE  control with self care, reaching, carrying, lifting, house/yardwork, driving/computer work      Manual Treatments:  PROM / STM / Oscillations-Mobs:  G-I, II, III, IV (Hermila, Inf., Post.)  [x] (88940) Provided manual therapy to mobilize soft tissue/joints of cervical/CT, scapular GHJ and UE for the purpose of modulating pain, promoting relaxation,  increasing ROM, reducing/eliminating soft tissue swelling/inflammation/restriction, improving soft tissue extensibility and allowing for proper ROM for normal function with self care, reaching, carrying, lifting, house/yardwork, driving/computer work    Modalities:     [x] GAME READY (VASO)- for significant edema, swelling, pain control. Charges:  Timed Code Treatment Minutes: 38   Total Treatment Minutes:  48   BWC:  TE TIME:  NMR TIME:  MANUAL TIME:  UNTIMED MINUTES:  Medicare Total:           $560      [] EVAL (LOW) 33450 (typically 20 minutes face-to-face)  [] EVAL (MOD) 54903 (typically 30 minutes face-to-face)  [] EVAL (HIGH) 63877 (typically 45 minutes face-to-face)  [] RE-EVAL     [x] WL(05593) x     [] IONTO  [x] NMR (22086) x     [x] VASO  [x] Manual (46633) x     [] Other:  [] TA x      [] Mech Traction (25609)  [] ES(attended) (16716)      [] ES (un) (97462):    ASSESSMENT:    Very gradually progressing ROM and strength. Good tolerance of manual therapy and there ex. Pt fatigues quickly with ex. No complaints at conclusion, stated still soreness. Stated does feel that shoulder is feeling some improvement however does have mm soreness. GOALS:     Patient stated goal: Less pain. Therapist goals for Patient:   Short Term Goals: To be achieved in: 2 weeks  1. Independent in HEP and progression per patient tolerance, in order to prevent re-injury. [x] Progressing: [] Met: [] Not Met: [] Adjusted     2. Patient will have a decrease in pain to facilitate improvement in movement, function, and ADLs as indicated by Functional Deficits.   [x] Progressing: [] Met: [] Not Met: [] Adjusted     Long Term Goals: To be achieved in: 12 weeks  1. FOTO score will match or exceed predicted score to assist with reaching prior level of function. [x] Progressing: [] Met: [] Not Met: [] Adjusted     2. Patient will demonstrate increased AROM to 165 active elevation no increased symptoms  to allow for proper joint functioning as indicated by patients Functional Deficits. [x] Progressing: [] Met: [] Not Met: [] Adjusted     3. Patient will demonstrate an increase in Strength to 5/5 to allow for proper functional mobility as indicated by patients Functional Deficits. [x] Progressing: [] Met: [] Not Met: [] Adjusted     4. Patient will return to functional activities beekeeping activities lifting 50lb without increased symptoms or restriction. [x] Progressing: [] Met: [] Not Met: [] Adjusted     5. 80% decrease in pain (patient specific functional goal)    [x] Progressing: [] Met: [] Not Met: [] Adjusted          Overall Progression Towards Functional goals/ Treatment Progress Update:  [x] Patient is progressing as expected towards functional goals listed. [] Progression is slowed due to complexities/Impairments listed. [] Progression has been slowed due to co-morbidities.   [] Plan just implemented, too soon to assess goals progression <30days   [] Goals require adjustment due to lack of progress  [] Patient is not progressing as expected and requires additional follow up with physician  [] Other    Prognosis for POC: [x] Good [] Fair  [] Poor      Patient requires continued skilled intervention: [x] Yes  [] No    Treatment/Activity Tolerance:  [x] Patient able to complete treatment  [] Patient limited by fatigue  [x] Patient limited by pain    [] Patient limited by other medical complications  [] Other:       PLAN: See eval  [x] Continue per plan of care [] Alter current plan (see comments above)  [] Plan of care initiated [] Hold pending MD visit [] Discharge    Electronically signed by:  Kostas Osorio PT    Note: If patient does not return for scheduled/ recommended follow up visits, this note will serve as a discharge from care along with most recent update on progress.

## 2023-03-01 ENCOUNTER — HOSPITAL ENCOUNTER (OUTPATIENT)
Age: 73
Discharge: HOME | End: 2023-03-01
Payer: MEDICARE

## 2023-03-01 LAB
ALBUMIN SERPL-MCNC: 4.4 G/DL (ref 3.4–5)
ANION GAP SERPL CALCULATED.3IONS-SCNC: 10 MMOL/L (ref 3–16)
BUN BLDV-MCNC: 20 MG/DL (ref 7–20)
CALCIUM SERPL-MCNC: 9.7 MG/DL (ref 8.3–10.6)
CHLORIDE BLD-SCNC: 99 MMOL/L (ref 99–110)
CO2: 28 MMOL/L (ref 21–32)
CREAT SERPL-MCNC: 1.1 MG/DL (ref 0.8–1.3)
GFR SERPL CREATININE-BSD FRML MDRD: >60 ML/MIN/{1.73_M2}
GLUCOSE BLD-MCNC: 169 MG/DL (ref 70–99)
PHOSPHORUS: 4.1 MG/DL (ref 2.5–4.9)
POTASSIUM SERPL-SCNC: 3.9 MMOL/L (ref 3.5–5.1)
SODIUM BLD-SCNC: 137 MMOL/L (ref 136–145)

## 2023-03-01 PROCEDURE — 80069 RENAL FUNCTION PANEL: CPT

## 2023-03-01 PROCEDURE — 36415 COLL VENOUS BLD VENIPUNCTURE: CPT

## 2023-03-07 ENCOUNTER — HOSPITAL ENCOUNTER (OUTPATIENT)
Dept: PHYSICAL THERAPY | Age: 73
Setting detail: THERAPIES SERIES
Discharge: HOME | End: 2023-03-07
Payer: MEDICARE

## 2023-03-07 PROCEDURE — 97110 THERAPEUTIC EXERCISES: CPT

## 2023-03-07 PROCEDURE — 97016 VASOPNEUMATIC DEVICE THERAPY: CPT

## 2023-03-07 PROCEDURE — 97112 NEUROMUSCULAR REEDUCATION: CPT

## 2023-03-07 PROCEDURE — 97140 MANUAL THERAPY 1/> REGIONS: CPT

## 2023-03-07 NOTE — FLOWSHEET NOTE
[facility], [facility]  [address] [place] [address]  Phone: [phone]   Fax:     [phone]      Physical Therapy Treatment Note/ Progress Report:       Date:  3/7/2023    Patient Name:  Nithin Stewart :  1950  MRN: [id number]  Restrictions/Precautions:    Medical/Treatment Diagnosis Information:  Diagnosis: Chronic left shoulder pain M25.512  Treatment Diagnosis: Chronic left shoulder pain M25.512  Insurance/Certification information:  1PT Insurance Information: Medicare A/B  Physician Information:   Joaquin  Has the plan of care been signed (Y/N):        [x]  Yes  []  No     Date of Patient follow up with Physician:  3/9/23    Is this a Progress Report:     []  Yes  [x]  No     If Yes:  Date Range for reporting period:  Beginnin23 ------------ Endin23    Progress report will be due (10 Rx or 30 days whichever is less): 3/17/23     Recertification will be due (POC Duration  / 90 days whichever is less): 23      Visit # Insurance Allowable Auth Required   In Person 7 Med Nec []  Yes     []  No    Tele Health 0  []  Yes     []  No    Total 7       FOTO Score: QDASH  68%    Date assessed:  23     Latex Allergy:  [x]NO      []YES  Preferred Language for Healthcare:   [x]English       []other:    Pain level:  2-7/10     SUBJECTIVE:   Pt stated still hurts however does have some better days.    OBJECTIVE: See eval  Observation:   Test measurements:      RESTRICTIONS/PRECAUTIONS:  HTN, DM II, HLD    Exercises/Interventions:   Therapeutic Ex (00067) Sets/sec Reps Notes/CUES HEP   Supine cane flexion 10s 10 vc    Supine serratus punch 5# 3 10 vc    SLER ecc lower 2#  30     Prone shoulder ext 2# 30     Submax iso ER 2     Submax iso IR  TB ER  TB IR  TB ext  TB tricep ext  TB row  Wall push ups  Ball rolls on wall 2  3  3  3  3  3  3 10  10  10  10  10  10  30s ea   Blue, vc  Blue, vc  Silver,  vc  Silver, vc  Silver, vc    Pulleys 5 min                                  Pt education 10 min  Reviewed posture, HEP with gradual progression, goals of PT, not to push through pain with ex or activity, use of ice- pt stated understanding    Manual Intervention (32628)       PROM, STM, grade I/II jt mobs 10 min                                         NMR re-education (81408)   CUES NEEDED                                                                   Therapeutic Activity (01302)                                          [employer] access code:  [email or web address]           Therapeutic Exercise and NMR EXR  [x] (81924) Provided verbal/tactile cueing for activities related to strengthening, flexibility, endurance, ROM  for improvements in scapular, scapulothoracic and UE control with self care, reaching, carrying, lifting, house/yardwork, driving/computer work. [x] (82575) Provided verbal/tactile cueing for activities related to improving balance, coordination, kinesthetic sense, posture, motor skill, proprioception  to assist with  scapular, scapulothoracic and UE control with self care, reaching, carrying, lifting, house/yardwork, driving/computer work. Therapeutic Activities:    [x] (26115 or 65736) Provided verbal/tactile cueing for activities related to improving balance, coordination, kinesthetic sense, posture, motor skill, proprioception and motor activation to allow for proper function of scapular, scapulothoracic and UE control with self care, carrying, lifting, driving/computer work.      Home Exercise Program:    [x] (42342) Reviewed/Progressed HEP activities related to strengthening, flexibility, endurance, ROM of scapular, scapulothoracic and UE control with self care, reaching, carrying, lifting, house/yardwork, driving/computer work  [x] (61356) Reviewed/Progressed HEP activities related to improving balance, coordination, kinesthetic sense, posture, motor skill, proprioception of scapular, scapulothoracic and UE control with self care, reaching, carrying, lifting, house/yardwork, driving/computer work      Manual Treatments:  PROM / STM / Oscillations-Mobs:  G-I, II, III, IV (PA's, Inf., Post.)  [x] (38253) Provided manual therapy to mobilize soft tissue/joints of cervical/CT, scapular GHJ and UE for the purpose of modulating pain, promoting relaxation,  increasing ROM, reducing/eliminating soft tissue swelling/inflammation/restriction, improving soft tissue extensibility and allowing for proper ROM for normal function with self care, reaching, carrying, lifting, house/yardwork, driving/computer work    Modalities:     [x] GAME READY (VASO)- for significant edema, swelling, pain control. Charges:  Timed Code Treatment Minutes: 38   Total Treatment Minutes:  48   BWC:  TE TIME:  NMR TIME:  MANUAL TIME:  UNTIMED MINUTES:  Medicare Total:           $660      [] EVAL (LOW) 96929 (typically 20 minutes face-to-face)  [] EVAL (MOD) 99878 (typically 30 minutes face-to-face)  [] EVAL (HIGH) 18561 (typically 45 minutes face-to-face)  [] RE-EVAL     [x] AO(28291) x     [] IONTO  [x] NMR (66418) x     [x] VASO  [x] Manual (07313) x     [] Other:  [] TA x      [] Mech Traction (62658)  [] ES(attended) (19309)      [] ES (un) (92835):    ASSESSMENT:    Very gradually progressing ROM and strength. Good tolerance of manual therapy and there ex. Pt fatigues quickly with ex. No complaints at conclusion, stated still soreness. Stated does feel that shoulder is feeling some improvement however does have mm soreness. Stated overall pain levels remain high. Stated may be interested in cortisone shot however has not seen orthopedic doc at this time. GOALS:     Patient stated goal: Less pain. Therapist goals for Patient:   Short Term Goals: To be achieved in: 2 weeks  1. Independent in HEP and progression per patient tolerance, in order to prevent re-injury.    [x] Progressing: [] Met: [] Not Met: [] Adjusted 2. Patient will have a decrease in pain to facilitate improvement in movement, function, and ADLs as indicated by Functional Deficits. [x] Progressing: [] Met: [] Not Met: [] Adjusted     Long Term Goals: To be achieved in: 12 weeks  1. FOTO score will match or exceed predicted score to assist with reaching prior level of function. [x] Progressing: [] Met: [] Not Met: [] Adjusted     2. Patient will demonstrate increased AROM to 165 active elevation no increased symptoms  to allow for proper joint functioning as indicated by patients Functional Deficits. [x] Progressing: [] Met: [] Not Met: [] Adjusted     3. Patient will demonstrate an increase in Strength to 5/5 to allow for proper functional mobility as indicated by patients Functional Deficits. [x] Progressing: [] Met: [] Not Met: [] Adjusted     4. Patient will return to functional activities beekeeping activities lifting 50lb without increased symptoms or restriction. [x] Progressing: [] Met: [] Not Met: [] Adjusted     5. 80% decrease in pain (patient specific functional goal)    [x] Progressing: [] Met: [] Not Met: [] Adjusted          Overall Progression Towards Functional goals/ Treatment Progress Update:  [x] Patient is progressing as expected towards functional goals listed. [] Progression is slowed due to complexities/Impairments listed. [] Progression has been slowed due to co-morbidities.   [] Plan just implemented, too soon to assess goals progression <30days   [] Goals require adjustment due to lack of progress  [] Patient is not progressing as expected and requires additional follow up with physician  [] Other    Prognosis for POC: [x] Good [] Fair  [] Poor      Patient requires continued skilled intervention: [x] Yes  [] No    Treatment/Activity Tolerance:  [x] Patient able to complete treatment  [] Patient limited by fatigue  [x] Patient limited by pain    [] Patient limited by other medical complications  [] Other: PLAN: See eval  [x] Continue per plan of care [] Alter current plan (see comments above)  [] Plan of care initiated [] Hold pending MD visit [] Discharge    Electronically signed by:  Danielle Blevins PT    Note: If patient does not return for scheduled/ recommended follow up visits, this note will serve as a discharge from care along with most recent update on progress.

## 2023-03-09 ENCOUNTER — OFFICE VISIT (OUTPATIENT)
Dept: FAMILY MEDICINE CLINIC | Age: 73
End: 2023-03-09
Payer: MEDICARE

## 2023-03-09 VITALS
HEART RATE: 72 BPM | OXYGEN SATURATION: 100 % | DIASTOLIC BLOOD PRESSURE: 82 MMHG | SYSTOLIC BLOOD PRESSURE: 142 MMHG | WEIGHT: 199 LBS | BODY MASS INDEX: 32.12 KG/M2

## 2023-03-09 DIAGNOSIS — E11.649 UNCONTROLLED TYPE 2 DIABETES MELLITUS WITH HYPOGLYCEMIA WITHOUT COMA (HCC): ICD-10-CM

## 2023-03-09 DIAGNOSIS — E11.65 UNCONTROLLED TYPE 2 DIABETES MELLITUS WITH HYPERGLYCEMIA (HCC): Primary | ICD-10-CM

## 2023-03-09 DIAGNOSIS — Z79.4 UNCONTROLLED TYPE 2 DIABETES MELLITUS WITH HYPERGLYCEMIA, WITH LONG-TERM CURRENT USE OF INSULIN (HCC): ICD-10-CM

## 2023-03-09 DIAGNOSIS — E11.9 DIABETES MELLITUS WITH COINCIDENT HYPERTENSION (HCC): ICD-10-CM

## 2023-03-09 DIAGNOSIS — E11.65 UNCONTROLLED TYPE 2 DIABETES MELLITUS WITH HYPERGLYCEMIA, WITH LONG-TERM CURRENT USE OF INSULIN (HCC): ICD-10-CM

## 2023-03-09 DIAGNOSIS — I10 DIABETES MELLITUS WITH COINCIDENT HYPERTENSION (HCC): ICD-10-CM

## 2023-03-09 DIAGNOSIS — E11.21 DIABETIC NEPHROPATHY ASSOCIATED WITH TYPE 2 DIABETES MELLITUS (HCC): ICD-10-CM

## 2023-03-09 DIAGNOSIS — E11.319 DIABETIC RETINOPATHY ASSOCIATED WITH TYPE 2 DIABETES MELLITUS, MACULAR EDEMA PRESENCE UNSPECIFIED, UNSPECIFIED LATERALITY, UNSPECIFIED RETINOPATHY SEVERITY (HCC): ICD-10-CM

## 2023-03-09 DIAGNOSIS — R97.20 ELEVATED PSA: ICD-10-CM

## 2023-03-09 LAB — HBA1C MFR BLD: 9.8 %

## 2023-03-09 PROCEDURE — 3078F DIAST BP <80 MM HG: CPT | Performed by: FAMILY MEDICINE

## 2023-03-09 PROCEDURE — 1123F ACP DISCUSS/DSCN MKR DOCD: CPT | Performed by: FAMILY MEDICINE

## 2023-03-09 PROCEDURE — 3046F HEMOGLOBIN A1C LEVEL >9.0%: CPT | Performed by: FAMILY MEDICINE

## 2023-03-09 PROCEDURE — 3074F SYST BP LT 130 MM HG: CPT | Performed by: FAMILY MEDICINE

## 2023-03-09 PROCEDURE — 2022F DILAT RTA XM EVC RTNOPTHY: CPT | Performed by: FAMILY MEDICINE

## 2023-03-09 PROCEDURE — G8417 CALC BMI ABV UP PARAM F/U: HCPCS | Performed by: FAMILY MEDICINE

## 2023-03-09 PROCEDURE — G8427 DOCREV CUR MEDS BY ELIG CLIN: HCPCS | Performed by: FAMILY MEDICINE

## 2023-03-09 PROCEDURE — 99215 OFFICE O/P EST HI 40 MIN: CPT | Performed by: FAMILY MEDICINE

## 2023-03-09 PROCEDURE — 1036F TOBACCO NON-USER: CPT | Performed by: FAMILY MEDICINE

## 2023-03-09 PROCEDURE — 3017F COLORECTAL CA SCREEN DOC REV: CPT | Performed by: FAMILY MEDICINE

## 2023-03-09 PROCEDURE — G8484 FLU IMMUNIZE NO ADMIN: HCPCS | Performed by: FAMILY MEDICINE

## 2023-03-09 RX ORDER — BLOOD-GLUCOSE SENSOR
1 EACH MISCELLANEOUS
Qty: 6 EACH | Refills: 3 | Status: SHIPPED | OUTPATIENT
Start: 2023-03-09

## 2023-03-09 RX ORDER — LOSARTAN POTASSIUM 25 MG/1
50 TABLET ORAL 2 TIMES DAILY
Qty: 360 TABLET | Refills: 0 | Status: SHIPPED | OUTPATIENT
Start: 2023-03-09 | End: 2023-04-08

## 2023-03-09 RX ORDER — SAXAGLIPTIN AND METFORMIN HYDROCHLORIDE 5; 1000 MG/1; MG/1
1 TABLET, FILM COATED, EXTENDED RELEASE ORAL DAILY
Qty: 90 TABLET | Refills: 3 | Status: SHIPPED | OUTPATIENT
Start: 2023-03-09

## 2023-03-09 RX ORDER — BLOOD-GLUCOSE SENSOR
1 EACH MISCELLANEOUS
Qty: 6 EACH | Refills: 3 | Status: SHIPPED | OUTPATIENT
Start: 2023-03-09 | End: 2023-03-09

## 2023-03-09 NOTE — PROGRESS NOTES
Adeline Monaco (:  1950) is a 67 y.o. male,Established patient, here for evaluation of the following chief complaint(s):  Diabetes         ASSESSMENT/PLAN:  1. Uncontrolled type 2 diabetes mellitus with hyperglycemia (HCC)  -     POCT glycosylated hemoglobin (Hb A1C)  -     losartan (COZAAR) 25 MG tablet; Take 2 tablets by mouth 2 times daily, Disp-360 tablet, R-0Normal  -     Continuous Blood Gluc Sensor (FREESTYLE BOYD 3 SENSOR) MISC; 1 each by Does not apply route every 14 days, Disp-6 each, R-3Normal  -     sAXagliptin-metFORMIN ER (KOMBIGLYZE XR) 5-1000 MG TB24; Take 1 tablet by mouth daily, Disp-90 tablet, R-3Print  -     HM DIABETES FOOT EXAM  As he has been approved for [employer] PAP, will see if we can get him Kombiglyze through that program. He refused GLP-1, which was actually my preference, Bydureon being covered under [place] PAP, but he reportedly had too many side effects on Victoza and does not want a GLP-1. Will see if DPP-4 with metformin may help without as many GI side effects. Discussed adjusting night time insulin dosing down by about 2 units for now. May need to increase dose of morning insulin by 2 units and try to consistently take it around the same time each day. He was set up with a CGM in the office today and was shown how to insert it, how to use it, and was connected to the patient portal.   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     2. Uncontrolled type 2 diabetes mellitus with hypoglycemia without coma ([facility] [address]  -     POCT glycosylated hemoglobin (Hb A1C)  -     losartan (COZAAR) 25 MG tablet; Take 2 tablets by mouth 2 times daily, Disp-360 tablet, R-0Normal  -     Continuous Blood Gluc Sensor (FREESTYLE BOYD 3 SENSOR) MISC; 1 each by Does not apply route every 14 days, Disp-6 each, R-3Normal  -     sAXagliptin-metFORMIN ER (KOMBIGLYZE XR) 5-1000 MG TB24; Take 1 tablet by mouth daily, Disp-90 tablet, R-3Print  -     HM DIABETES FOOT EXAM  Discussed adjusting night time insulin dosing down by about 2 units for now. May need to increase dose of morning insulin by 2 units and try to consistently take it around the same time each day. 3. Diabetes mellitus with coincident hypertension (HCC)  -     POCT glycosylated hemoglobin (Hb A1C)  -     losartan (COZAAR) 25 MG tablet; Take 2 tablets by mouth 2 times daily, Disp-360 tablet, R-0Normal  -     Continuous Blood Gluc Sensor (FREESTYLE BOYD 3 SENSOR) MISC; 1 each by Does not apply route every 14 days, Disp-6 each, R-3Normal  Uncontrolled today. Discussed using 100 mg losartan. Consider increasing amlodipine if not controlled at next visit. He will also be following up with nephrology, whom will likely adjust his medication should BP be uncontrolled there as well. 4. Uncontrolled type 2 diabetes mellitus with hyperglycemia, with long-term current use of insulin (HCC)  -     POCT glycosylated hemoglobin (Hb A1C)  -     losartan (COZAAR) 25 MG tablet; Take 2 tablets by mouth 2 times daily, Disp-360 tablet, R-0Normal  -     Continuous Blood Gluc Sensor (FREESTYLE BOYD 3 SENSOR) MISC; 1 each by Does not apply route every 14 days, Disp-6 each, R-3Normal  -     sAXagliptin-metFORMIN ER (KOMBIGLYZE XR) 5-1000 MG TB24; Take 1 tablet by mouth daily, Disp-90 tablet, R-3Print  -     HM DIABETES FOOT EXAM  Discussed adjusting night time insulin dosing down by about 2 units for now.  May need to increase dose of morning insulin by 2 units and try to consistently take it around the same time each day. 5. Diabetic nephropathy associated with type 2 diabetes mellitus (HCC)  -     POCT glycosylated hemoglobin (Hb A1C)  -     losartan (COZAAR) 25 MG tablet; Take 2 tablets by mouth 2 times daily, Disp-360 tablet, R-0Normal  -     Continuous Blood Gluc Sensor (FREESTYLE BOYD 3 SENSOR) MISC; 1 each by Does not apply route every 14 days, Disp-6 each, R-3Normal  6. Elevated PSA  Patient declines any intervention at this time. He would like to have PSA rechecked in a few months. He does not want to see Dr. Sridevi Zuñiga anymore as he does not agree with biopsy. He understands he has a higher likelihood of prostate cancer given very elevated PSAs and high rate of rise. 7. Diabetic retinopathy associated with type 2 diabetes mellitus, macular edema presence unspecified, unspecified laterality, unspecified retinopathy severity ([address]  Continue to follow up with [facility] as planned, he has been going regularly, eye exams are documented in his chart. No follow-ups on file. Subjective   SUBJECTIVE/OBJECTIVE:   Chief Complaint   Patient presents with    Diabetes       HPI Diabetes Mellitus Type 2: Current symptoms/problems include  fluctuating blood sugars, high and low. He has had a difficult time with timing of medications some days and isn't always getting insulin in at the right time. He will have lows mostly in the mornings, and highs in the evenings. He has not gone down on his night time insulin dose . He is asking for a CGM due to fluctuating blood sugars. His wife has been adjusting his BP meds as that has also been fluctuating. He has low blood pressure some days, and she'll give him 75 mg of losartan on those days. Days where he is higher she will give him the prescribed 100 mg. This was increased by the nephrologist. He was also put on torsemide instead of Lasix, which has helped with edema in addition to adding [place].  He had been on [place] previously, but discontinued it due to cost of the medication. He report being told by the nephrologist that the COVID vaccine may have caused his proteinuria. There was concerns about urine samples not being done each year. These have been done yearly except 2022, where it was ordered multiple times throughout that year, but not completed before the patient left the office. PSA is still very elevated. He was evaluated by Dr. Evonne Acosta, urology, but refused to have biopsy again. He is concerned that biopsy would cause any potential cancer in the prostate to spread all over his body. He does not want a biopsy. Recent imaging of kidneys demonstrated very large prostate, a renal;     Medication compliance:  compliant most of the time  Diabetic diet compliance:  compliant most of the time,  Weight trend: decreasing  Barriers: was put on Marcelyn Patch by nephrology, Dr. Zaid Gongora, it was too expensive but he ended up qualifying for patient assistance through [employer]. Home blood sugar records: fasting range: 70's mostly, postprandial range: 200's  Any episodes of hypoglycemia? yes - occurring in the mornings  Eye exam current (within one year): yes   reports that he has never smoked. He has never used smokeless tobacco.   Daily Aspirin?  Yes  Known diabetic complications: nephropathy and retinopathy    Lab Results   Component Value Date    LABA1C 9.8 03/09/2023    LABA1C 7.9 01/06/2023    LABA1C 8.2 09/08/2022     Lab Results   Component Value Date    LABMICR YES 02/16/2023    CREATININE 1.1 03/01/2023     Lab Results   Component Value Date    ALT 22 01/06/2023    AST 23 01/06/2023     No components found for: CHLPL  Lab Results   Component Value Date    TRIG 69 01/06/2023     Lab Results   Component Value Date    HDL 63 (H) 01/06/2023     Lab Results   Component Value Date/Time    LDLCALC 100 01/06/2023 10:32 AM           Review of Systems - per HPI       Objective    Vitals:    03/09/23 1400 03/09/23 1522   BP: (!) 160/82 (!) 142/82   Site: Left Upper Arm Left Upper Arm   Position: Sitting Sitting   Cuff Size: Small Adult Small Adult   Pulse: 72    SpO2: 100%    Weight: 199 lb (90.3 kg)       Physical Exam  Vitals and nursing note reviewed. Constitutional:       General: He is not in acute distress. Appearance: Normal appearance. He is well-developed. He is not diaphoretic. Cardiovascular:      Pulses:           Dorsalis pedis pulses are 2+ on the right side and 2+ on the left side. Posterior tibial pulses are 2+ on the right side and 2+ on the left side. Musculoskeletal:      Right foot: Normal range of motion. No deformity. Left foot: Normal range of motion. No deformity. Feet:      Right foot:      Protective Sensation: 10 sites tested. 10 sites sensed. Skin integrity: No ulcer, blister, skin breakdown, erythema, warmth, callus or dry skin. Left foot:      Protective Sensation: 10 sites tested. 10 sites sensed. Skin integrity: No ulcer, blister, skin breakdown, erythema, warmth, callus or dry skin. Neurological:      Mental Status: He is alert. Sensory: No sensory deficit. Deep Tendon Reflexes:      Reflex Scores:       Achilles reflexes are 2+ on the right side and 2+ on the left side. Comments: Normal proprioception of big toes bilaterally        Results for POC orders placed in visit on 03/09/23   POCT glycosylated hemoglobin (Hb A1C)   Result Value Ref Range    Hemoglobin A1C 9.8 %     Component      Latest Ref Rng & Units 3/9/2023 1/6/2023 9/8/2022 5/10/2022           2:38 PM 10:03 AM  3:00 PM  9:36 AM   Hemoglobin A1C      % 9.8 7.9 8.2 8.2   eAG (mg/dL)      mg/dL           Narrative   EXAMINATION:   RETROPERITONEAL ULTRASOUND OF THE KIDNEYS AND URINARY BLADDER       2/16/2023       COMPARISON:   None       HISTORY:   ORDERING SYSTEM PROVIDED HISTORY: Acute kidney injury superimposed on CKD   ([facility] [address]       FINDINGS:       Kidneys:        The right kidney measures 11.7 x 4.7 x 5.5 cm in size and the left kidney   measures 12.5 x 4.1 x 5.5 cm in size. Kidneys demonstrate normal cortical echogenicity. No evidence of   hydronephrosis or intrarenal stones. There is a 2.2 x 2.1 x 1.9 cm cyst   within the mid right kidney. No solid renal mass is evident. Bladder:       Unremarkable appearance of the bladder. The patient did not void for the   study. The prostate is severely enlarged, measuring 116 cc in volume. Impression   1. 2.2 cm cyst within the mid right kidney. Otherwise, unremarkable   sonographic appearance of the bilateral kidneys. 2. Unremarkable sonographic appearance of the urinary bladder. 3. Severe prostatomegaly. Narrative   Renal Duplex Study        Demographics        Patient Name       Rigoberto Luke        Date of Study      02/16/2023        Gender              Male        Patient Number     [id number]        Date of Birth       1950        Visit Number       [id number]         Age                 67 year(s)        Accession Number   [id number]        Room Number                 Corporate ID       [id number]          Sonographer         Willian Cosme RVT, ANGELA,        Ordering Physician Jignesh Luna MD  Interpreting        [facility]                                         Physician           Maggie Holcomb MD       Procedure       Type of Study:        Abdominal:Renal, VL RENAL ARTERIAL DUPLEX COMPLETE. Vascular Sonographer Report       Indications for Study:Hypertension. Additional Indications:Hypertension       Renal Artery Duplex Scan: Color and grayscale imaging of the abdominal aorta,   bilateral renal arteries and kidneys, including Doppler spectral waveform   analysis.        Impressions   Right Impression   There is no evidence to suggest renal artery stenosis. The renal vein is patent. Left Impression   There is no evidence to suggest renal artery stenosis. The renal vein is patent. There is no previous exam for comparison. Conclusions        Summary        Normal bilateral renal artery study        Signature        ------------------------------------------------------------------    Electronically signed by Darren Garcia MD (Interpreting    physician) on 02/17/2023 at 10:47 AM    ------------------------------------------------------------------       Blood Pressure:Right arm 126/ mmHg. Patient Status:Routine. Study [address] [place] [address] Drive - Vascular Lab. Technical Quality:Adequate visualization. Velocities are measured in cm/s ; Diameters are measured in cm       Abdominal Aortic Flow   +-----------------+----+---+-------+----------+   ! Location         ! PSV ! EDV! AP Diam!Trans Diam!   +-----------------+----+---+-------+----------+   ! Prox Aorta       !94.6!   !2.22   !2.06      !   +-----------------+----+---+-------+----------+   ! Aorta Juxta Renal!98.9!   !1.71   !1.73      ! +-----------------+----+---+-------+----------+       Renal Duplex Measurements   +--------------++-----+----+----+----++----+----+----+----+   ! Renal Artery A!! Right! ! Left!    !!    !    !    !    !   +--------------++-----+----+----+----++----+----+----+----+   ! Location      ! !PSV  ! EDV ! RI  !RAR !!PSV ! EDV ! RI  !RAR !   +--------------++-----+----+----+----++----+----+----+----+   ! Ostial Renal  !!80.9 !17.2!0.79!0.82!!91.2!21.5!0.76!0.92! +--------------++-----+----+----+----++----+----+----+----+   ! Prox Renal    !!120  !30.1!0.75!1.21! !83.4!20.6!0.75!0.84!   +--------------++-----+----+----+----++----+----+----+----+   ! Mid Renal     !!102  !22.4!0.78!1.03! !89.5!20.6!0.77!0.9 ! +--------------++-----+----+----+----++----+----+----+----+   ! Dist Renal    !!102 ! 24.2!0.76!1.03!!73.2!12.5!0.83!0.74!   +--------------++-----+----+----+----++----+----+----+----+       +---------------++-----+----+----++----+----+----+   ! Upper Pole     ! ! Right! ! Left!!    !    !    !   +---------------++-----+----+----++----+----+----+   ! Location       ! !PSV  ! EDV ! RI  !!PSV ! EDV ! RI  !   +---------------++-----+----+----++----+----+----+   ! Upper P. Cortex! !16.5 !5.72!0.65!!18.4!6.14!0.67!   +---------------++-----+----+----++----+----+----+   Left Miscellaneous Measurements     - The average kidney length is 12.01 cm.      Lab Review   Hospital Outpatient Visit on 03/01/2023   Component Date Value    Sodium 03/01/2023 137     Potassium 03/01/2023 3.9     Chloride 03/01/2023 99     CO2 03/01/2023 28     Anion Gap 03/01/2023 10     Glucose 03/01/2023 169 (A)     BUN 03/01/2023 20     Creatinine 03/01/2023 1.1     EstKeith Filt Rate 03/01/2023 >60     Calcium 03/01/2023 9.7     Phosphorus 03/01/2023 4.1     Albumin 03/01/2023 4.4    Hospital Outpatient Visit on 02/16/2023   Component Date Value    Color, UA 02/16/2023 Yellow     Clarity, UA 02/16/2023 Clear     Glucose, Ur 02/16/2023 >=1000 (A)     Bilirubin Urine 02/16/2023 Negative     Ketones, Urine 02/16/2023 Negative     Specific Gravity, UA 02/16/2023 1.020     Blood, Urine 02/16/2023 Negative     pH, UA 02/16/2023 6.0     Protein, UA 02/16/2023 100 (A)     Urobilinogen, Urine 02/16/2023 0.2     Nitrite, Urine 02/16/2023 Negative     Leukocyte Esterase, Urine 02/16/2023 Negative     Microscopic Examination 02/16/2023 YES     Urine Type 02/16/2023 Voided     Protein, Ur 02/16/2023 134.00 (A)     Creatinine, Ur 02/16/2023 94.9     Protein/Creat Ratio 02/16/2023 1.4     Sodium 02/16/2023 140     Potassium 02/16/2023 4.6     Chloride 02/16/2023 101     CO2 02/16/2023 30     Anion Gap 02/16/2023 9     Glucose 02/16/2023 119 (A)     BUN 02/16/2023 24 (A)     Creatinine 02/16/2023 1.5 (A)     Est, Glom Filt Rate 02/16/2023 49 (A) Calcium 02/16/2023 9.8     Phosphorus 02/16/2023 5.1 (A)     Albumin 02/16/2023 4.0     Hyaline Casts, UA 02/16/2023 0-2     Mucus, UA 02/16/2023 Rare (A)     WBC, UA 02/16/2023 0-2     RBC, UA 02/16/2023 0-2    Hospital Outpatient Visit on 01/30/2023   Component Date Value    Vit D, 25-Hydroxy 01/30/2023 34.6     Hep B Core Total Ab 01/30/2023 Negative     TSH 01/30/2023 0.93     RALPH 01/30/2023 Negative     Total Syphillis IgG/IgM 01/30/2023 Non-Reactive     HIV Ag/Ab 01/30/2023 Non-Reactive     HIV-1 Antibody 01/30/2023 Non-Reactive     HIV ANTIGEN 01/30/2023 Non-Reactive     HIV-2 Ab 01/30/2023 Non-Reactive     KAPPA, FREE LIGHT CHAINS* 01/30/2023 46.06 (A)     LAMBDA, FREE LIGHT CHAIN* 01/30/2023 27.41 (A)     K/L RATIO 01/30/2023 1.68 (A)     KAPPA/LAMBDA TEST COMMENT 01/30/2023 see below     Total Protein 01/30/2023 6.8     Albumin 01/30/2023 3.5     Evqdl-2-Xdngaoph 01/30/2023 0.3     Alpha-2-Globulin 01/30/2023 1.0     Beta Globulin 01/30/2023 1.1     Gamma Globulin 01/30/2023 0.9     Misc Test Order 01/30/2023 SEE NOTE     Cryoglobulin, Qualitative 01/30/2023 NEG 72Hour     Complement Activity, Tot* 01/30/2023 >95.0 (A)     SPE/JEISON Interpretation 01/30/2023 REVIEWED    Orders Only on 01/19/2023   Component Date Value    24hr Urine Volume (ml) 01/18/2023 1375     Creatinine, 24H Ur 01/18/2023 1.7     Protein, 24H Urine 01/18/2023 4.414 (A)        On this date 3/9/2023 I have spent 50 minutes reviewing previous notes, test results, and face to face with the patient discussing the diagnosis and the treatment plan, as well as documenting on the day of the visit. An electronic signature was used to authenticate this note.     --Matilda Najjar, MD

## 2023-03-09 NOTE — PROGRESS NOTES
Met with patient to discuss CGM. Patient educated on how to properly place sensor. Assisted patient with downloading FreeStyle godwin 2 marguerite onto smart phone and educated on how to use marguerite, view settings, alarms and how to scan sensor using phone. Patient was also added to our [employer] portal. Patient demonstrated understanding of how to use FreeStyle Godwin 2 sensor and marguerite and denied any questions at this time.        Electronically signed by Maximilian Singletary LPN on 8/5/3081 at 2:60 PM

## 2023-03-20 ENCOUNTER — PATIENT MESSAGE (OUTPATIENT)
Dept: FAMILY MEDICINE CLINIC | Age: 73
End: 2023-03-20

## 2023-03-20 ENCOUNTER — TELEPHONE (OUTPATIENT)
Dept: FAMILY MEDICINE CLINIC | Age: 73
End: 2023-03-20

## 2023-03-20 NOTE — TELEPHONE ENCOUNTER
Good Morning     So far the left arm and shoulder has not responded well to physical therapy. Knot in upper left arm and pain/burning sensation most of the time. There are times it is ok and then it just starts acting up Sleeping is getting problematic, goes off several times a night. Treating with rikki Self Jr. aspirin ice packs heat      cannot get tebdon/muscle to release pain runs from top of arm through arm down into and beyond elbow     please advise upon options at this point? Cortisone shot ? Not doing well with the diabetes monitor still highs and low, I just look at food and it goes off the scale. trying to adjust when and amounts of insulin during the day      Still think I am on too many Meds      Anything we can suspend temporally ?      Thank You     Jose Granados

## 2023-03-20 NOTE — TELEPHONE ENCOUNTER
I will refer him to orthopedics at this point. I need to have the glucose readings, along with the number of units of insulin he has given himself and at what times. I have to have this help adjust his medications. I would like to see if the Kombiglyze we just added at his last visit will help. He can stop the regular metformin and take that medication instead once it has arrived from the patient assistance program.   I don't see anything else to get rid of yet, but this may change over the next few months.

## 2023-03-22 NOTE — TELEPHONE ENCOUNTER
Wife returned call, I relayed your last message. They would like a Ortho referral to [facility] if possible. They never got the [facility]. I see that it was sent on 3/9 but I guess this was an E-scribing error?

## 2023-03-23 NOTE — TELEPHONE ENCOUNTER
Selene Hawkins was supposed to go through the [employer] [facility] [facility] patient assistance program. Can someone please ensure that was sent in for him? Do they have a particular doctor at [facility] they want to see?

## 2023-03-24 ENCOUNTER — PATIENT MESSAGE (OUTPATIENT)
Dept: FAMILY MEDICINE CLINIC | Age: 73
End: 2023-03-24

## 2023-03-24 ENCOUNTER — TELEPHONE (OUTPATIENT)
Dept: FAMILY MEDICINE CLINIC | Age: 73
End: 2023-03-24

## 2023-03-24 DIAGNOSIS — E11.65 UNCONTROLLED TYPE 2 DIABETES MELLITUS WITH HYPERGLYCEMIA (HCC): Primary | ICD-10-CM

## 2023-03-24 SDOH — HEALTH STABILITY: PHYSICAL HEALTH: ON AVERAGE, HOW MANY DAYS PER WEEK DO YOU ENGAGE IN MODERATE TO STRENUOUS EXERCISE (LIKE A BRISK WALK)?: 7 DAYS

## 2023-03-24 ASSESSMENT — SOCIAL DETERMINANTS OF HEALTH (SDOH)
WITHIN THE LAST YEAR, HAVE TO BEEN RAPED OR FORCED TO HAVE ANY KIND OF SEXUAL ACTIVITY BY YOUR PARTNER OR EX-PARTNER?: NO
WITHIN THE LAST YEAR, HAVE YOU BEEN HUMILIATED OR EMOTIONALLY ABUSED IN OTHER WAYS BY YOUR PARTNER OR EX-PARTNER?: NO
WITHIN THE LAST YEAR, HAVE YOU BEEN KICKED, HIT, SLAPPED, OR OTHERWISE PHYSICALLY HURT BY YOUR PARTNER OR EX-PARTNER?: NO
WITHIN THE LAST YEAR, HAVE YOU BEEN AFRAID OF YOUR PARTNER OR EX-PARTNER?: NO

## 2023-03-24 NOTE — TELEPHONE ENCOUNTER
From: Krista Landa  To: Dr. Alisia Mohamud: 3/24/2023 8:43 AM EDT  Subject: Free style Godwin 3    Good morning     Trying to change out sensor on arm   Only have the sensor  Is there an applicator?   Need assistance this am if possible  Have to leave for work after 9:00 am     Thank you    Iwona Ortiz    [id number] [facility] [employer]    [phone] [place]

## 2023-03-24 NOTE — TELEPHONE ENCOUNTER
Met with patient for FreeStyle Godwin 2 CGM education. Patient shown how to apply and use godwin 2R sensor with the applicator. Discussed importance of rotating sites with every sensor. Placed sensor into right. Discussed options available to help prevent sensor from coming off prematurely and steps to take if it does. Assisted patient with setting up FreeStyle godwin 2 reader. Shown how to view settings, alarms and how to scan sensor using reader. Patient verbalized and demonstrated understanding on how to apply sensor and use reader. Patient was added to office [employer] Portal. Informed patient to call the office and let me know if they have any questions or concerns. Patient denied any further questions or concerns at this time. Electronically signed by Dot Stokes LPN on 1/58/3451 at 22:26 AM    Ordered FreeStyle Godwin 2 CGM through [employer] and will monitor.

## 2023-03-27 ENCOUNTER — OFFICE VISIT (OUTPATIENT)
Dept: ORTHOPEDIC SURGERY | Age: 73
End: 2023-03-27
Payer: MEDICARE

## 2023-03-27 VITALS — HEIGHT: 66 IN | WEIGHT: 199 LBS | BODY MASS INDEX: 31.98 KG/M2

## 2023-03-27 DIAGNOSIS — M75.82 TENDINITIS OF LEFT ROTATOR CUFF: Primary | ICD-10-CM

## 2023-03-27 DIAGNOSIS — M25.512 LEFT SHOULDER PAIN, UNSPECIFIED CHRONICITY: ICD-10-CM

## 2023-03-27 PROCEDURE — 1123F ACP DISCUSS/DSCN MKR DOCD: CPT | Performed by: ORTHOPAEDIC SURGERY

## 2023-03-27 PROCEDURE — 3017F COLORECTAL CA SCREEN DOC REV: CPT | Performed by: ORTHOPAEDIC SURGERY

## 2023-03-27 PROCEDURE — G8484 FLU IMMUNIZE NO ADMIN: HCPCS | Performed by: ORTHOPAEDIC SURGERY

## 2023-03-27 PROCEDURE — 1036F TOBACCO NON-USER: CPT | Performed by: ORTHOPAEDIC SURGERY

## 2023-03-27 PROCEDURE — G8427 DOCREV CUR MEDS BY ELIG CLIN: HCPCS | Performed by: ORTHOPAEDIC SURGERY

## 2023-03-27 PROCEDURE — 99204 OFFICE O/P NEW MOD 45 MIN: CPT | Performed by: ORTHOPAEDIC SURGERY

## 2023-03-27 PROCEDURE — G8417 CALC BMI ABV UP PARAM F/U: HCPCS | Performed by: ORTHOPAEDIC SURGERY

## 2023-03-27 NOTE — PROGRESS NOTES
SURGERY      left wrist and right anle    VASECTOMY          Allergies: Allergies   Allergen Reactions    Lisinopril      cough    Other      INOVAR    Toujeo Solostar [Insulin Glargine]      Dizziness, weight gain    Viagra [Sildenafil Citrate] Other (See Comments)     headaches    Victoza [Liraglutide] Diarrhea, Nausea And Vomiting and Other (See Comments)     Nausea, abdominal pain           Medications:   Current Outpatient Medications:     diclofenac sodium (VOLTAREN) 1 % GEL, Apply 4 g topically 4 times daily as needed for Pain, Disp: 100 g, Rfl: 3    Continuous Blood Gluc Sensor (FREESTYLE BOYD 2 SENSOR) MISC, 1 Device by Does not apply route every 14 days, Disp: 2 each, Rfl: 3    Continuous Blood Gluc  (FREESTYLE BOYD 2 READER) RAFAEL, 1 Device by Does not apply route daily, Disp: 1 each, Rfl: 0    losartan (COZAAR) 25 MG tablet, Take 2 tablets by mouth 2 times daily, Disp: 360 tablet, Rfl: 0    sAXagliptin-metFORMIN ER (KOMBIGLYZE XR) 5-1000 MG TB24, Take 1 tablet by mouth daily, Disp: 90 tablet, Rfl: 3    torsemide (DEMADEX) 20 MG tablet, Take 1 tablet by mouth daily, Disp: 30 tablet, Rfl: 0    amLODIPine (NORVASC) 5 MG tablet, Take 1 tablet by mouth daily, Disp: 30 tablet, Rfl: 1    atorvastatin (LIPITOR) 40 MG tablet, Take 1 tablet by mouth once daily, Disp: 90 tablet, Rfl: 1    acarbose (PRECOSE) 100 MG tablet, TAKE ONE TABLET BY MOUTH THREE TIMES A DAY WITH A MEAL (Patient not taking: Reported on 3/9/2023), Disp: 90 tablet, Rfl: 1    triamcinolone (KENALOG) 0.1 % cream, Apply topically 2 times daily. , Disp: 45 g, Rfl: 2    tadalafil (CIALIS) 5 MG tablet, Take 1 tablet by mouth daily, Disp: 90 tablet, Rfl: 3    omeprazole (PRILOSEC) 20 MG delayed release capsule, Take 1 capsule by mouth every morning (before breakfast), Disp: 90 capsule, Rfl: 1    insulin 70-30 (NOVOLIN 70/30 RELION) (70-30) 100 UNIT per ML injection vial, INJECT 25 UNITS SUBCUTANEOUSLY IN THE MORNING AND 10-15 UNITS NIGHTLY,

## 2023-04-25 ENCOUNTER — TELEPHONE (OUTPATIENT)
Dept: FAMILY MEDICINE CLINIC | Age: 73
End: 2023-04-25

## 2023-04-25 NOTE — TELEPHONE ENCOUNTER
Pharmacy calling to clarify medication order/prescription:    Ph[facility]     Medication:  freestyle mary 3 sensor kit    Si Device by Does not apply route every 14 days    Quantity: 6    Comments: Rx sent for mary 3. Need new rx for [employer] sensors.

## 2023-05-01 ENCOUNTER — CARE COORDINATION (OUTPATIENT)
Dept: CARE COORDINATION | Age: 73
End: 2023-05-01

## 2023-05-01 NOTE — TELEPHONE ENCOUNTER
CGM report pulled and printed from [facility] [address]. Provided to MA for scanning and to give to PCP for review.

## 2023-05-01 NOTE — CARE COORDINATION
Ambulatory Care Coordination Note  2023    Patient Current Location:  Home: [address] [facility] [facility] [address]    ACM contacted the patient by telephone. Verified name and  with patient and family as identifiers. Provided introduction to self, and explanation of the ACM role. ACM: Nirali Bloom RN    Challenges to be reviewed by the provider   Additional needs identified to be addressed with provider: Yes  medications-Medications are flagged for review/ removal as patient no longer taking medication. Method of communication with provider: chart routing. ACM reached out to patient and wife Margi Carmona who was also on call. ACM completed med rec as requested by PCP. ACM offered ongoing care management wife and patient agreed. Margi Carmona said that CGM is working but getting a 40-50 point difference from CGM vs Fingerstick readings. Margi Carmona said blood sugars are ranging anywhere from  with not being consistent. ACM will alert PCP of this and have CGM readings printed for provider review. AC also offered RPM at this time and provided CPT codes for billing purposes. Margi Carmona and patient had no additional questions or concerns at this time. Plan:    F/U call 1 week  Complete enrollment into CC  BS logs    Offered patient enrollment in the Remote Patient Monitoring (RPM) program for in-home monitoring: Yes, but did not enroll at this time. Ambulatory Care Coordination Assessment    Care Coordination Protocol  Referral from Primary Care Provider: Yes  Week 1 - Initial Assessment     Do you have all of your prescriptions and are they filled?: Yes  Barriers to medication adherence: None  Are you able to afford your medications?: With Assistance  Medication Assistance Program: Patient assistance with pharmaceutical company  How often do you have trouble taking your medications the way you have been told to take them?: I always take them as prescribed.      Do you have Home O2

## 2023-05-03 RX ORDER — NAPROXEN SODIUM 220 MG
TABLET ORAL
Qty: 200 EACH | Refills: 0 | Status: SHIPPED | OUTPATIENT
Start: 2023-05-03

## 2023-05-08 ENCOUNTER — HOSPITAL ENCOUNTER (OUTPATIENT)
Age: 73
Discharge: HOME | End: 2023-05-08
Payer: MEDICARE

## 2023-05-08 ENCOUNTER — CARE COORDINATION (OUTPATIENT)
Dept: CARE COORDINATION | Age: 73
End: 2023-05-08

## 2023-05-08 LAB
ALBUMIN SERPL-MCNC: 4.3 G/DL (ref 3.4–5)
ANION GAP SERPL CALCULATED.3IONS-SCNC: 9 MMOL/L (ref 3–16)
BUN SERPL-MCNC: 21 MG/DL (ref 7–20)
CALCIUM SERPL-MCNC: 9.3 MG/DL (ref 8.3–10.6)
CHLORIDE SERPL-SCNC: 105 MMOL/L (ref 99–110)
CO2 SERPL-SCNC: 27 MMOL/L (ref 21–32)
CREAT SERPL-MCNC: 1.5 MG/DL (ref 0.8–1.3)
CREAT UR-MCNC: 19.8 MG/DL (ref 39–259)
GFR SERPLBLD CREATININE-BSD FMLA CKD-EPI: 49 ML/MIN/{1.73_M2}
GLUCOSE SERPL-MCNC: 159 MG/DL (ref 70–99)
MICROALBUMIN UR DL<=1MG/L-MCNC: 16.7 MG/DL
MICROALBUMIN/CREAT UR: 843.4 MG/G (ref 0–30)
PHOSPHATE SERPL-MCNC: 3.1 MG/DL (ref 2.5–4.9)
POTASSIUM SERPL-SCNC: 4.4 MMOL/L (ref 3.5–5.1)
PROT UR-MCNC: 25 MG/DL
PROT/CREAT UR-RTO: 1.3 MG/DL
SODIUM SERPL-SCNC: 141 MMOL/L (ref 136–145)

## 2023-05-08 PROCEDURE — 82570 ASSAY OF URINE CREATININE: CPT

## 2023-05-08 PROCEDURE — 82043 UR ALBUMIN QUANTITATIVE: CPT

## 2023-05-08 PROCEDURE — 80069 RENAL FUNCTION PANEL: CPT

## 2023-05-08 PROCEDURE — 36415 COLL VENOUS BLD VENIPUNCTURE: CPT

## 2023-05-08 PROCEDURE — 84156 ASSAY OF PROTEIN URINE: CPT

## 2023-05-08 NOTE — CARE COORDINATION
Ambulatory Care Coordination Note  2023    Patient Current Location:  Home: [address] [facility] [address]     ACM contacted the family by telephone. Verified name and  with family as identifiers. Provided introduction to self, and explanation of the ACM role. Challenges to be reviewed by the provider   Additional needs identified to be addressed with provider: No  none               Method of communication with provider: chart routing. ACM: Bonifacio Almodovar RN     ACM completed follow up call with patient wife Neva Tello who said patient is getting blood work for nephrology follow up appt next week. Neva Tello said that marvinetn blood sugar seems to have a leveled off with night time readings in the 110-120s. Neva Tello said patient is taking all medications as prescribed. Neva Tello said that patient does peak at times throughout the day as patient does not always eat the best things when he is working. ACM voiced understanding. Neva Tello has no additional questions at this time. Plan:    F/U call 1 week  RPM f/u on CPT codes  BS logs    Offered patient enrollment in the Remote Patient Monitoring (RPM) program for in-home monitoring: Yes, but did not enroll at this time. Lab Results       None            Care Coordination Interventions    Referral from Primary Care Provider: Yes  Suggested Interventions and Community Resources  Zone Management Tools: In Process (Comment: DM)          Goals Addressed                   This Visit's Progress     Conditions and Symptoms        I will schedule office visits, as directed by my provider. I will keep my appointment or reschedule if I have to cancel. I will notify my provider of any barriers to my plan of care. I will follow my Zone Management tool to seek urgent or emergent care. I will notify my provider of any symptoms that indicate a worsening of my condition.     Barriers: overwhelmed by complexity of regimen  Plan for overcoming my barriers:

## 2023-05-15 ENCOUNTER — CARE COORDINATION (OUTPATIENT)
Dept: CARE COORDINATION | Age: 73
End: 2023-05-15

## 2023-05-15 NOTE — CARE COORDINATION
JOIE attempted follow up call with patient who said he had Nephrology follow up today. JOIE said she would outreach next week to complete follow up call. Patient thanked JOIE for calling and checking on him.

## 2023-05-19 ENCOUNTER — HOSPITAL ENCOUNTER (OUTPATIENT)
Age: 73
Discharge: HOME | End: 2023-05-19
Payer: MEDICARE

## 2023-05-19 LAB
ALBUMIN SERPL-MCNC: 4.2 G/DL (ref 3.4–5)
ANION GAP SERPL CALCULATED.3IONS-SCNC: 8 MMOL/L (ref 3–16)
BILIRUB UR QL STRIP.AUTO: NEGATIVE
BUN SERPL-MCNC: 22 MG/DL (ref 7–20)
CALCIUM SERPL-MCNC: 9.4 MG/DL (ref 8.3–10.6)
CHLORIDE SERPL-SCNC: 101 MMOL/L (ref 99–110)
CK SERPL-CCNC: 139 U/L (ref 39–308)
CLARITY UR: CLEAR
CO2 SERPL-SCNC: 29 MMOL/L (ref 21–32)
COLOR UR: YELLOW
CREAT SERPL-MCNC: 1.3 MG/DL (ref 0.8–1.3)
EPI CELLS #/AREA URNS HPF: ABNORMAL /HPF (ref 0–5)
GFR SERPLBLD CREATININE-BSD FMLA CKD-EPI: 58 ML/MIN/{1.73_M2}
GLUCOSE SERPL-MCNC: 119 MG/DL (ref 70–99)
GLUCOSE UR STRIP.AUTO-MCNC: >=1000 MG/DL
HGB UR QL STRIP.AUTO: NEGATIVE
HYALINE CASTS #/AREA URNS LPF: ABNORMAL /LPF (ref 0–2)
KETONES UR STRIP.AUTO-MCNC: NEGATIVE MG/DL
LEUKOCYTE ESTERASE UR QL STRIP.AUTO: NEGATIVE
MUCOUS THREADS #/AREA URNS LPF: ABNORMAL /LPF
NITRITE UR QL STRIP.AUTO: NEGATIVE
PH UR STRIP.AUTO: 6.5 [PH] (ref 5–8)
PHOSPHATE SERPL-MCNC: 4.2 MG/DL (ref 2.5–4.9)
POTASSIUM SERPL-SCNC: 4.1 MMOL/L (ref 3.5–5.1)
PROT UR STRIP.AUTO-MCNC: 100 MG/DL
RBC #/AREA URNS HPF: ABNORMAL /HPF (ref 0–4)
SODIUM SERPL-SCNC: 138 MMOL/L (ref 136–145)
SODIUM UR-SCNC: 101 MMOL/L
SP GR UR STRIP.AUTO: 1.02 (ref 1–1.03)
UA DIPSTICK W REFLEX MICRO PNL UR: YES
URN SPEC COLLECT METH UR: ABNORMAL
UROBILINOGEN UR STRIP-ACNC: 0.2 E.U./DL
WBC #/AREA URNS HPF: ABNORMAL /HPF (ref 0–5)

## 2023-05-19 PROCEDURE — 81001 URINALYSIS AUTO W/SCOPE: CPT

## 2023-05-19 PROCEDURE — 80069 RENAL FUNCTION PANEL: CPT

## 2023-05-19 PROCEDURE — 36415 COLL VENOUS BLD VENIPUNCTURE: CPT

## 2023-05-19 PROCEDURE — 84300 ASSAY OF URINE SODIUM: CPT

## 2023-05-19 PROCEDURE — 82550 ASSAY OF CK (CPK): CPT

## 2023-05-24 ENCOUNTER — CARE COORDINATION (OUTPATIENT)
Dept: CARE COORDINATION | Age: 73
End: 2023-05-24

## 2023-05-24 SDOH — ECONOMIC STABILITY: FOOD INSECURITY: WITHIN THE PAST 12 MONTHS, YOU WORRIED THAT YOUR FOOD WOULD RUN OUT BEFORE YOU GOT MONEY TO BUY MORE.: NEVER TRUE

## 2023-05-24 SDOH — HEALTH STABILITY: PHYSICAL HEALTH: ON AVERAGE, HOW MANY MINUTES DO YOU ENGAGE IN EXERCISE AT THIS LEVEL?: 30 MIN

## 2023-05-24 SDOH — ECONOMIC STABILITY: INCOME INSECURITY: IN THE LAST 12 MONTHS, WAS THERE A TIME WHEN YOU WERE NOT ABLE TO PAY THE MORTGAGE OR RENT ON TIME?: NO

## 2023-05-24 SDOH — ECONOMIC STABILITY: FOOD INSECURITY: WITHIN THE PAST 12 MONTHS, THE FOOD YOU BOUGHT JUST DIDN'T LAST AND YOU DIDN'T HAVE MONEY TO GET MORE.: NEVER TRUE

## 2023-05-24 SDOH — HEALTH STABILITY: PHYSICAL HEALTH: ON AVERAGE, HOW MANY DAYS PER WEEK DO YOU ENGAGE IN MODERATE TO STRENUOUS EXERCISE (LIKE A BRISK WALK)?: 7 DAYS

## 2023-05-24 SDOH — ECONOMIC STABILITY: HOUSING INSECURITY: IN THE LAST 12 MONTHS, HOW MANY PLACES HAVE YOU LIVED?: 1

## 2023-05-24 ASSESSMENT — LIFESTYLE VARIABLES
HOW MANY STANDARD DRINKS CONTAINING ALCOHOL DO YOU HAVE ON A TYPICAL DAY: PATIENT DOES NOT DRINK
HOW OFTEN DO YOU HAVE A DRINK CONTAINING ALCOHOL: NEVER

## 2023-05-24 ASSESSMENT — SOCIAL DETERMINANTS OF HEALTH (SDOH)
HOW HARD IS IT FOR YOU TO PAY FOR THE VERY BASICS LIKE FOOD, HOUSING, MEDICAL CARE, AND HEATING?: NOT VERY HARD
HOW OFTEN DO YOU ATTENT MEETINGS OF THE CLUB OR ORGANIZATION YOU BELONG TO?: MORE THAN 4 TIMES PER YEAR
HOW OFTEN DO YOU ATTEND CHURCH OR RELIGIOUS SERVICES?: NEVER
HOW OFTEN DO YOU GET TOGETHER WITH FRIENDS OR RELATIVES?: THREE TIMES A WEEK
DO YOU BELONG TO ANY CLUBS OR ORGANIZATIONS SUCH AS CHURCH GROUPS UNIONS, FRATERNAL OR ATHLETIC GROUPS, OR SCHOOL GROUPS?: NO
IN A TYPICAL WEEK, HOW MANY TIMES DO YOU TALK ON THE PHONE WITH FAMILY, FRIENDS, OR NEIGHBORS?: THREE TIMES A WEEK

## 2023-05-24 NOTE — CARE COORDINATION
Ambulatory Care Coordination Note  2023    Patient Current Location:  Home: [address] [facility] [address]     ACM contacted the patient by telephone. Verified name and  with patient as identifiers. Provided introduction to self, and explanation of the ACM role. Challenges to be reviewed by the provider   Additional needs identified to be addressed with provider: No  none               Method of communication with provider: chart routing. ACM: Devonte Moses RN    ACM completed follow up call with patient wife Zeinab Monae who is verified on HIPAA form. Zeinab Monae said patient was out working at this time and unable to participate in the follow up call. Zeinab Monae said that patient had follow up [facility] to have last office not faxed over. Zeinab Monae said a new medication was recommended but could not remember the name. Zeinab Monae had no other concerns at this time. Plan:    F/U call 2 weeks  [facility] for last office note    Offered patient enrollment in the Remote Patient Monitoring (RPM) program for in-home monitoring: Patient declined. Lab Results       None            Care Coordination Interventions    Referral from Primary Care Provider: Yes  Suggested Interventions and Community Resources  Zone Management Tools:  In Process (Comment: DM)          Goals Addressed    None         Future Appointments   Date Time Provider Som Lucas   2023  9:30 AM MD HANDY Jett - [facility]   9/15/2023  9:30 AM MD HANDY Jett  Martha YUEN   ,   Diabetes Assessment    Medic Alert ID: No  Meal Planning: Calorie counting, Carb counting   How often do you test your blood sugar?: Meals   Have you ever had to go to the ED for symptoms of low blood sugar?: No       Increase or Decrease trend in Blood Sugars   Do you have hyperglycemia symptoms?: No   Do you have hypoglycemia symptoms?: No   Blood Sugar Monitoring Regimen: Before Meals, At Bedtime   Blood Sugar Trends:

## 2023-07-03 ENCOUNTER — HOSPITAL ENCOUNTER (OUTPATIENT)
Age: 73
Discharge: HOME | End: 2023-07-03
Payer: MEDICARE

## 2023-07-03 LAB
ALBUMIN SERPL-MCNC: 4.2 G/DL (ref 3.4–5)
ANION GAP SERPL CALCULATED.3IONS-SCNC: 12 MMOL/L (ref 3–16)
BUN SERPL-MCNC: 17 MG/DL (ref 7–20)
CALCIUM SERPL-MCNC: 9.3 MG/DL (ref 8.3–10.6)
CHLORIDE SERPL-SCNC: 101 MMOL/L (ref 99–110)
CO2 SERPL-SCNC: 28 MMOL/L (ref 21–32)
CREAT SERPL-MCNC: 1.4 MG/DL (ref 0.8–1.3)
GFR SERPLBLD CREATININE-BSD FMLA CKD-EPI: 53 ML/MIN/{1.73_M2}
GLUCOSE SERPL-MCNC: 143 MG/DL (ref 70–99)
PHOSPHATE SERPL-MCNC: 3.8 MG/DL (ref 2.5–4.9)
POTASSIUM SERPL-SCNC: 4.2 MMOL/L (ref 3.5–5.1)
SODIUM SERPL-SCNC: 141 MMOL/L (ref 136–145)

## 2023-07-03 PROCEDURE — 80069 RENAL FUNCTION PANEL: CPT

## 2023-07-03 PROCEDURE — 36415 COLL VENOUS BLD VENIPUNCTURE: CPT

## 2023-07-06 ENCOUNTER — HOSPITAL ENCOUNTER (OUTPATIENT)
Age: 73
Discharge: HOME | End: 2023-07-06
Payer: MEDICARE

## 2023-07-06 LAB
CREAT 24H UR-MRATE: 1.2 G/24HR (ref 0.6–2.5)
PROT 24H UR-MRATE: 1.41 G/24HR
SPECIMEN VOL 24H UR: 1700 ML

## 2023-07-06 PROCEDURE — 84156 ASSAY OF PROTEIN URINE: CPT

## 2023-07-13 NOTE — TELEPHONE ENCOUNTER
[facility]  Adult Discharge Orders & Instructions      For 24 hours after surgery:  Get plenty of rest.  A responsible adult must stay with you for at least 24 hours after you leave the hospital.   You may feel lightheaded.  IF so, sit for a few minutes before standing.  Have someone help you get up.   You may have a slight fever. Call the doctor if your fever is over 101 F (38.3 C) (taken under the tongue) or lasts longer than 24 hours.  You may have a dry mouth, a sore throat, muscle aches or trouble sleeping.  These should go away after 24 hours.  Do not make important or legal decisions.  6.   Do not drive or use heavy equipment.  If you have weakness or tingling, don't drive or use heavy equipment until this feeling goes away.                                                                                                                                                                              Surgeon Contact Information  If you have questions or concerns related to your procedure please contact your surgeon through [facility] at [phone].    Scheduled pt 3/9/21 at 11am ok per oh to use provider fill spot

## 2023-07-19 LAB
P2PSA: 149.9 PG/ML
PHI-HYB: 179
PROSTATE SPECIFIC ANTIGEN FREE: 5.14 NG/ML
PROSTATE SPECIFIC ANTIGEN PERCENT FREE: 13.7 %
PSA, TOTAL: 37.57 NG/ML (ref 0–4)

## 2023-07-26 ENCOUNTER — TELEPHONE (OUTPATIENT)
Dept: FAMILY MEDICINE CLINIC | Age: 73
End: 2023-07-26

## 2023-07-26 SDOH — HEALTH STABILITY: PHYSICAL HEALTH: ON AVERAGE, HOW MANY MINUTES DO YOU ENGAGE IN EXERCISE AT THIS LEVEL?: 30 MIN

## 2023-07-26 SDOH — HEALTH STABILITY: PHYSICAL HEALTH: ON AVERAGE, HOW MANY DAYS PER WEEK DO YOU ENGAGE IN MODERATE TO STRENUOUS EXERCISE (LIKE A BRISK WALK)?: 7 DAYS

## 2023-07-26 ASSESSMENT — LIFESTYLE VARIABLES
HOW OFTEN DO YOU HAVE A DRINK CONTAINING ALCOHOL: NEVER
HOW MANY STANDARD DRINKS CONTAINING ALCOHOL DO YOU HAVE ON A TYPICAL DAY: 0
HOW OFTEN DO YOU HAVE SIX OR MORE DRINKS ON ONE OCCASION: 1
HOW MANY STANDARD DRINKS CONTAINING ALCOHOL DO YOU HAVE ON A TYPICAL DAY: PATIENT DOES NOT DRINK
HOW OFTEN DO YOU HAVE A DRINK CONTAINING ALCOHOL: 1

## 2023-07-26 ASSESSMENT — PATIENT HEALTH QUESTIONNAIRE - PHQ9
SUM OF ALL RESPONSES TO PHQ QUESTIONS 1-9: 0
2. FEELING DOWN, DEPRESSED OR HOPELESS: 0
1. LITTLE INTEREST OR PLEASURE IN DOING THINGS: 0
SUM OF ALL RESPONSES TO PHQ9 QUESTIONS 1 & 2: 0

## 2023-07-26 NOTE — TELEPHONE ENCOUNTER
S/w spouse to schedule AWV- she will have pt call back to schedule.    - This can be done  in office or via telephone-( please schedule as a [facility] appt type if telephone.)

## 2023-07-27 ENCOUNTER — TELEMEDICINE (OUTPATIENT)
Dept: FAMILY MEDICINE CLINIC | Age: 73
End: 2023-07-27

## 2023-07-27 DIAGNOSIS — Z00.00 MEDICARE ANNUAL WELLNESS VISIT, SUBSEQUENT: Primary | ICD-10-CM

## 2023-07-27 NOTE — PATIENT INSTRUCTIONS
Personalized Preventive Plan for Hina Wilson - 7/27/2023  Medicare offers a range of preventive health benefits. Some of the tests and screenings are paid in full while other may be subject to a deductible, co-insurance, and/or copay. Some of these benefits include a comprehensive review of your medical history including lifestyle, illnesses that may run in your family, and various assessments and screenings as appropriate. After reviewing your medical record and screening and assessments performed today your provider may have ordered immunizations, labs, imaging, and/or referrals for you. A list of these orders (if applicable) as well as your Preventive Care list are included within your After Visit Summary for your review. Other Preventive Recommendations:    A preventive eye exam performed by an eye specialist is recommended every 1-2 years to screen for glaucoma; cataracts, macular degeneration, and other eye disorders. A preventive dental visit is recommended every 6 months. Try to get at least 150 minutes of exercise per week or 10,000 steps per day on a pedometer . Order or download the FREE \"Exercise & Physical Activity: Your Everyday Guide\" from [employer]. Call 8-262.591.8162 or search [employer] online. You need 4570-7079 mg of calcium and 3006-7307 IU of vitamin D per day. It is possible to meet your calcium requirement with diet alone, but a vitamin D supplement is usually necessary to meet this goal.  When exposed to the sun, use a sunscreen that protects against both UVA and UVB radiation with an SPF of 30 or greater. Reapply every 2 to 3 hours or after sweating, drying off with a towel, or swimming. Always wear a seat belt when traveling in a car. Always wear a helmet when riding a bicycle or motorcycle.

## 2023-08-07 DIAGNOSIS — E11.649 UNCONTROLLED TYPE 2 DIABETES MELLITUS WITH HYPOGLYCEMIA WITHOUT COMA (HCC): ICD-10-CM

## 2023-08-07 NOTE — TELEPHONE ENCOUNTER
Refill Request     CONFIRM preferred pharmacy with the patient. If Mail Order Rx - Pend for 90 day refill. Last Seen: Last Seen Department: 7/27/2023    Last Seen by PCP: 7/27/2023    Last Written: 1/6/23 20mL 2 refills    If no future appointment scheduled:  Review the last OV with PCP and review information for follow-up visit,  Route STAFF MESSAGE with patient name to the [facility] for scheduling with the following information:            -  Timing of next visit           -  Visit type ie Physical, OV, etc           -  Diagnoses/Reason ie. COPD, HTN - Do not use MEDICATION, Follow-up or CHECK UP - Give reason for visit      Next Appointment: 9/15/23  Future Appointments   Date Time Provider [address]   9/15/2023  9:30 [facility]       Message sent to [address] [place] [address] to schedule appt with patient?   NO      Requested Prescriptions     Pending Prescriptions Disp Refills    insulin 70-30 (NOVOLIN 70/30 RELION) (70-30) 100 UNIT per ML injection vial [Pharmacy Med Name: NovoLIN 70/30 ReliOn (70-30) 100 UNIT/ML Subcutaneous Suspension] 20 mL 0     Sig: INJECT 25 UNITS UNDER THE SKIN EVERY MORNING AND INJECT 10 TO 15 UNITS UNDER THE SKIN ONCE NIGHTLY

## 2023-08-10 DIAGNOSIS — E11.9 DIABETES MELLITUS WITH COINCIDENT HYPERTENSION (HCC): ICD-10-CM

## 2023-08-10 DIAGNOSIS — E11.649 UNCONTROLLED TYPE 2 DIABETES MELLITUS WITH HYPOGLYCEMIA WITHOUT COMA (HCC): ICD-10-CM

## 2023-08-10 DIAGNOSIS — E11.65 UNCONTROLLED TYPE 2 DIABETES MELLITUS WITH HYPERGLYCEMIA (HCC): ICD-10-CM

## 2023-08-10 DIAGNOSIS — E11.65 UNCONTROLLED TYPE 2 DIABETES MELLITUS WITH HYPERGLYCEMIA, WITH LONG-TERM CURRENT USE OF INSULIN (HCC): ICD-10-CM

## 2023-08-10 DIAGNOSIS — I10 DIABETES MELLITUS WITH COINCIDENT HYPERTENSION (HCC): ICD-10-CM

## 2023-08-10 DIAGNOSIS — R05.3 CHRONIC COUGH: ICD-10-CM

## 2023-08-10 DIAGNOSIS — K21.9 GASTROESOPHAGEAL REFLUX DISEASE WITHOUT ESOPHAGITIS: ICD-10-CM

## 2023-08-10 DIAGNOSIS — Z79.4 UNCONTROLLED TYPE 2 DIABETES MELLITUS WITH HYPERGLYCEMIA, WITH LONG-TERM CURRENT USE OF INSULIN (HCC): ICD-10-CM

## 2023-08-10 DIAGNOSIS — E11.21 DIABETIC NEPHROPATHY ASSOCIATED WITH TYPE 2 DIABETES MELLITUS (HCC): ICD-10-CM

## 2023-08-10 NOTE — TELEPHONE ENCOUNTER
..Refill Request     CONFIRM preferred pharmacy with the patient. If Mail Order Rx - Pend for 90 day refill. Last Seen: Last Seen Department: 7/27/2023  Last Seen by PCP: 7/27/2023    Last Written: 1-6-23 90 with 1     If no future appointment scheduled:  Review the last OV with PCP and review information for follow-up visit,  Route STAFF MESSAGE with patient name to the [facility] for scheduling with the following information:            -  Timing of next visit           -  Visit type ie Physical, OV, etc           -  Diagnoses/Reason ie. COPD, HTN - Do not use MEDICATION, Follow-up or CHECK UP - Give reason for visit      Next Appointment:   Future Appointments   Date Time Provider [address] [place] [address]   9/15/2023  9:30 AM MD HANDY Fitzgerald  Martha [facility]       Message sent to [address] [place] [address] to schedule appt with patient?   N/A      Requested Prescriptions     Pending Prescriptions Disp Refills    omeprazole (PRILOSEC) 20 MG delayed release capsule [Pharmacy Med Name: Omeprazole Oral Capsule Delayed Release 20 MG] 90 capsule 0     Sig: TAKE 1 CAPSULE BY MOUTH IN THE MORNING BEFORE BREAKFAST    losartan (COZAAR) 25 MG tablet [Pharmacy Med Name: Losartan Potassium Oral Tablet 25 MG] 360 tablet 0     Sig: TAKE 2 TABLETS BY MOUTH 2 TIMES A DAY

## 2023-08-11 RX ORDER — LOSARTAN POTASSIUM 25 MG/1
TABLET ORAL
Qty: 360 TABLET | Refills: 0 | Status: SHIPPED | OUTPATIENT
Start: 2023-08-11

## 2023-08-11 RX ORDER — OMEPRAZOLE 20 MG/1
CAPSULE, DELAYED RELEASE ORAL
Qty: 90 CAPSULE | Refills: 0 | Status: SHIPPED | OUTPATIENT
Start: 2023-08-11

## 2023-09-15 ENCOUNTER — OFFICE VISIT (OUTPATIENT)
Dept: FAMILY MEDICINE CLINIC | Age: 73
End: 2023-09-15
Payer: MEDICARE

## 2023-09-15 VITALS
SYSTOLIC BLOOD PRESSURE: 140 MMHG | WEIGHT: 207.4 LBS | OXYGEN SATURATION: 94 % | TEMPERATURE: 97.5 F | HEART RATE: 57 BPM | DIASTOLIC BLOOD PRESSURE: 68 MMHG | BODY MASS INDEX: 33.33 KG/M2 | HEIGHT: 66 IN

## 2023-09-15 DIAGNOSIS — R97.20 ELEVATED PSA, GREATER THAN OR EQUAL TO 20 NG/ML: ICD-10-CM

## 2023-09-15 DIAGNOSIS — F40.240 CLAUSTROPHOBIA: ICD-10-CM

## 2023-09-15 DIAGNOSIS — E78.5 DM TYPE 2 WITH DIABETIC DYSLIPIDEMIA (HCC): ICD-10-CM

## 2023-09-15 DIAGNOSIS — K21.9 GASTROESOPHAGEAL REFLUX DISEASE WITHOUT ESOPHAGITIS: ICD-10-CM

## 2023-09-15 DIAGNOSIS — E11.21 DIABETIC NEPHROPATHY ASSOCIATED WITH TYPE 2 DIABETES MELLITUS (HCC): Primary | ICD-10-CM

## 2023-09-15 DIAGNOSIS — E11.9 DIABETES MELLITUS WITH COINCIDENT HYPERTENSION (HCC): ICD-10-CM

## 2023-09-15 DIAGNOSIS — Z23 NEED FOR INFLUENZA VACCINATION: ICD-10-CM

## 2023-09-15 DIAGNOSIS — N40.0 PROSTATE ENLARGEMENT: ICD-10-CM

## 2023-09-15 DIAGNOSIS — I10 DIABETES MELLITUS WITH COINCIDENT HYPERTENSION (HCC): ICD-10-CM

## 2023-09-15 DIAGNOSIS — E11.69 DM TYPE 2 WITH DIABETIC DYSLIPIDEMIA (HCC): ICD-10-CM

## 2023-09-15 LAB — HBA1C MFR BLD: 6.8 %

## 2023-09-15 PROCEDURE — 3074F SYST BP LT 130 MM HG: CPT | Performed by: FAMILY MEDICINE

## 2023-09-15 PROCEDURE — 3078F DIAST BP <80 MM HG: CPT | Performed by: FAMILY MEDICINE

## 2023-09-15 PROCEDURE — 1123F ACP DISCUSS/DSCN MKR DOCD: CPT | Performed by: FAMILY MEDICINE

## 2023-09-15 PROCEDURE — G8417 CALC BMI ABV UP PARAM F/U: HCPCS | Performed by: FAMILY MEDICINE

## 2023-09-15 PROCEDURE — G8427 DOCREV CUR MEDS BY ELIG CLIN: HCPCS | Performed by: FAMILY MEDICINE

## 2023-09-15 PROCEDURE — 2022F DILAT RTA XM EVC RTNOPTHY: CPT | Performed by: FAMILY MEDICINE

## 2023-09-15 PROCEDURE — 83036 HEMOGLOBIN GLYCOSYLATED A1C: CPT | Performed by: FAMILY MEDICINE

## 2023-09-15 PROCEDURE — 3017F COLORECTAL CA SCREEN DOC REV: CPT | Performed by: FAMILY MEDICINE

## 2023-09-15 PROCEDURE — 99215 OFFICE O/P EST HI 40 MIN: CPT | Performed by: FAMILY MEDICINE

## 2023-09-15 PROCEDURE — 3044F HG A1C LEVEL LT 7.0%: CPT | Performed by: FAMILY MEDICINE

## 2023-09-15 PROCEDURE — 1036F TOBACCO NON-USER: CPT | Performed by: FAMILY MEDICINE

## 2023-09-15 RX ORDER — LANCETS 30 GAUGE
1 EACH MISCELLANEOUS 3 TIMES DAILY
Qty: 300 EACH | Refills: 3 | Status: SHIPPED | OUTPATIENT
Start: 2023-09-15

## 2023-09-15 RX ORDER — GLUCOSAMINE HCL/CHONDROITIN SU 500-400 MG
1 CAPSULE ORAL
Qty: 300 STRIP | Refills: 3 | Status: SHIPPED | OUTPATIENT
Start: 2023-09-15

## 2023-09-15 RX ORDER — ALUMINUM HYDROXIDE AND MAGNESIUM CARBONATE 160; 105 MG/1; MG/1
1 TABLET, CHEWABLE ORAL 4 TIMES DAILY PRN
Qty: 120 TABLET | Refills: 2 | Status: SHIPPED | OUTPATIENT
Start: 2023-09-15

## 2023-09-15 RX ORDER — AMLODIPINE BESYLATE 5 MG/1
5 TABLET ORAL DAILY
Qty: 90 TABLET | Refills: 1 | Status: SHIPPED | OUTPATIENT
Start: 2023-09-15

## 2023-09-15 RX ORDER — BLOOD-GLUCOSE METER
1 KIT MISCELLANEOUS DAILY
Qty: 1 KIT | Refills: 0 | Status: SHIPPED | OUTPATIENT
Start: 2023-09-15

## 2023-09-15 RX ORDER — TADALAFIL 5 MG/1
5 TABLET ORAL DAILY
Qty: 90 TABLET | Refills: 3 | Status: SHIPPED | OUTPATIENT
Start: 2023-09-15

## 2023-09-15 RX ORDER — ATORVASTATIN CALCIUM 40 MG/1
TABLET, FILM COATED ORAL
Qty: 90 TABLET | Refills: 1 | Status: SHIPPED | OUTPATIENT
Start: 2023-09-15

## 2023-09-15 RX ORDER — NAPROXEN SODIUM 220 MG
TABLET ORAL
Qty: 200 EACH | Refills: 0 | Status: SHIPPED | OUTPATIENT
Start: 2023-09-15

## 2023-09-15 RX ORDER — SAXAGLIPTIN AND METFORMIN HYDROCHLORIDE 5; 1000 MG/1; MG/1
1 TABLET, FILM COATED, EXTENDED RELEASE ORAL DAILY
Qty: 90 TABLET | Refills: 3 | Status: SHIPPED | OUTPATIENT
Start: 2023-09-15

## 2023-09-15 ASSESSMENT — PATIENT HEALTH QUESTIONNAIRE - PHQ9
SUM OF ALL RESPONSES TO PHQ9 QUESTIONS 1 & 2: 0
6. FEELING BAD ABOUT YOURSELF - OR THAT YOU ARE A FAILURE OR HAVE LET YOURSELF OR YOUR FAMILY DOWN: 0
1. LITTLE INTEREST OR PLEASURE IN DOING THINGS: 0
4. FEELING TIRED OR HAVING LITTLE ENERGY: 0
SUM OF ALL RESPONSES TO PHQ QUESTIONS 1-9: 0
8. MOVING OR SPEAKING SO SLOWLY THAT OTHER PEOPLE COULD HAVE NOTICED. OR THE OPPOSITE, BEING SO FIGETY OR RESTLESS THAT YOU HAVE BEEN MOVING AROUND A LOT MORE THAN USUAL: 0
2. FEELING DOWN, DEPRESSED OR HOPELESS: 0
5. POOR APPETITE OR OVEREATING: 0
7. TROUBLE CONCENTRATING ON THINGS, SUCH AS READING THE NEWSPAPER OR WATCHING TELEVISION: 0
SUM OF ALL RESPONSES TO PHQ QUESTIONS 1-9: 0
3. TROUBLE FALLING OR STAYING ASLEEP: 0
10. IF YOU CHECKED OFF ANY PROBLEMS, HOW DIFFICULT HAVE THESE PROBLEMS MADE IT FOR YOU TO DO YOUR WORK, TAKE CARE OF THINGS AT HOME, OR GET ALONG WITH OTHER PEOPLE: 0
SUM OF ALL RESPONSES TO PHQ QUESTIONS 1-9: 0
9. THOUGHTS THAT YOU WOULD BE BETTER OFF DEAD, OR OF HURTING YOURSELF: 0
SUM OF ALL RESPONSES TO PHQ QUESTIONS 1-9: 0

## 2023-09-15 ASSESSMENT — ANXIETY QUESTIONNAIRES
7. FEELING AFRAID AS IF SOMETHING AWFUL MIGHT HAPPEN: 0
4. TROUBLE RELAXING: 0
GAD7 TOTAL SCORE: 0
5. BEING SO RESTLESS THAT IT IS HARD TO SIT STILL: 0
3. WORRYING TOO MUCH ABOUT DIFFERENT THINGS: 0
1. FEELING NERVOUS, ANXIOUS, OR ON EDGE: 0
2. NOT BEING ABLE TO STOP OR CONTROL WORRYING: 0
IF YOU CHECKED OFF ANY PROBLEMS ON THIS QUESTIONNAIRE, HOW DIFFICULT HAVE THESE PROBLEMS MADE IT FOR YOU TO DO YOUR WORK, TAKE CARE OF THINGS AT HOME, OR GET ALONG WITH OTHER PEOPLE: NOT DIFFICULT AT ALL
6. BECOMING EASILY ANNOYED OR IRRITABLE: 0

## 2023-09-18 PROBLEM — E66.01 SEVERE OBESITY (BMI 35.0-39.9) WITH COMORBIDITY (HCC): Status: RESOLVED | Noted: 2022-02-08 | Resolved: 2023-09-18

## 2023-09-18 ASSESSMENT — ENCOUNTER SYMPTOMS: SHORTNESS OF BREATH: 0

## 2023-09-18 NOTE — PROGRESS NOTES
(FREESTYLE BOYD 2 READER) RAFAEL; 1 Device by Does not apply route daily, Disp-1 each, R-0Normal  -     Continuous Blood Gluc Sensor (FREESTYLE BOYD 2 SENSOR) MISC; 1 Device by Does not apply route every 14 days, Disp-2 each, R-3Normal  -     Insulin Syringe-Needle U-100 (INSULIN SYRINGE .5CC/31GX5/16\") 31G X 5/16\" 0.5 ML MISC; Disp-200 each, R-0, NormalUse 1 syring 4 times daily  -     Lancets MISC; 3 TIMES DAILY Starting Fri 9/15/2023, Disp-300 each, R-3, NormalDx 250.02  -     sAXagliptin-metFORMIN ER (KOMBIGLYZE XR) 5-1000 MG TB24; Take 1 tablet by mouth daily, Disp-90 tablet, R-3Normal  -     amLODIPine (NORVASC) 5 MG tablet; Take 1 tablet by mouth daily, Disp-90 tablet, R-1Normal  SBP uncontrolled today. He is going to stop back upstairs for BP recheck later this morning. If still elevated, would increase losartan dose. He may also check with Dr. Zee Gary, his nephrologist, in regards to BP medication management. 3. DM type 2 with diabetic dyslipidemia (HCC)  -     POCT glycosylated hemoglobin (Hb A1C)  -     blood glucose monitor strips; 1 strip by Other route 3 times daily (before meals) Freestyle Strips per patient.  Dx: E11.65, Other, 3 TIMES DAILY BEFORE MEALS Starting Fri 9/15/2023, Disp-300 strip, R-3, Normal  -     Blood Glucose Monitoring Suppl (FREESTYLE FREEDOM LITE) w/Device KIT; DAILY Starting Fri 9/15/2023, Disp-1 kit, R-0, Normal  -     Continuous Blood Gluc  (FREESTYLE BOYD 2 READER) RAFAEL; 1 Device by Does not apply route daily, Disp-1 each, R-0Normal  -     Continuous Blood Gluc Sensor (FREESTYLE BOYD 2 SENSOR) MISC; 1 Device by Does not apply route every 14 days, Disp-2 each, R-3Normal  -     Insulin Syringe-Needle U-100 (INSULIN SYRINGE .5CC/31GX5/16\") 31G X 5/16\" 0.5 ML MISC; Disp-200 each, R-0, NormalUse 1 syring 4 times daily  -     Lancets MISC; 3 TIMES DAILY Starting Fri 9/15/2023, Disp-300 each, R-3, NormalDx 250.02  -     sAXagliptin-metFORMIN ER (KOMBIGLYZE XR) 5-1000 MG TB24;

## 2023-10-10 ENCOUNTER — TELEPHONE (OUTPATIENT)
Dept: FAMILY MEDICINE CLINIC | Age: 73
End: 2023-10-10

## 2023-10-10 DIAGNOSIS — M25.512 CHRONIC LEFT SHOULDER PAIN: Primary | ICD-10-CM

## 2023-10-10 DIAGNOSIS — G89.29 CHRONIC LEFT SHOULDER PAIN: Primary | ICD-10-CM

## 2023-10-10 NOTE — TELEPHONE ENCOUNTER
----- Message from Richard Jimenez sent at 10/9/2023  8:58 PM EDT -----  Regarding: Left Shoulder Tendonitis   Contact: [phone]  Good Evening     Can you provide a cortisone shot for my left shoulder or can you refer me to orthopedic that can provide.     The last time we saw a an Orthopedic doctor he said I had to get blood sugar under control before he could give a shot    The shoulder is not responding well to physical Therapy and or anti-inflammatory at [address] in [address] [place] [address]

## 2023-10-10 NOTE — TELEPHONE ENCOUNTER
LMOM for pt to return phone call to the office    [address] [facility], [facility], [address], [address] [address]   Phone: [phone]

## 2023-10-11 ENCOUNTER — HOSPITAL ENCOUNTER (OUTPATIENT)
Age: 73
Discharge: HOME | End: 2023-10-11
Payer: MEDICARE

## 2023-10-11 LAB
ALBUMIN SERPL-MCNC: 4.3 G/DL (ref 3.4–5)
ANION GAP SERPL CALCULATED.3IONS-SCNC: 12 MMOL/L (ref 3–16)
BUN SERPL-MCNC: 20 MG/DL (ref 7–20)
CALCIUM SERPL-MCNC: 9 MG/DL (ref 8.3–10.6)
CHLORIDE SERPL-SCNC: 99 MMOL/L (ref 99–110)
CO2 SERPL-SCNC: 27 MMOL/L (ref 21–32)
CREAT SERPL-MCNC: 1.4 MG/DL (ref 0.8–1.3)
CREAT UR-MCNC: 33.4 MG/DL (ref 39–259)
GFR SERPLBLD CREATININE-BSD FMLA CKD-EPI: 53 ML/MIN/{1.73_M2}
GLUCOSE SERPL-MCNC: 201 MG/DL (ref 70–99)
MICROALBUMIN UR DL<=1MG/L-MCNC: 50.1 MG/DL
MICROALBUMIN/CREAT UR: 1500 MG/G (ref 0–30)
PHOSPHATE SERPL-MCNC: 3.8 MG/DL (ref 2.5–4.9)
POTASSIUM SERPL-SCNC: 4.1 MMOL/L (ref 3.5–5.1)
SODIUM SERPL-SCNC: 138 MMOL/L (ref 136–145)

## 2023-10-11 PROCEDURE — 82043 UR ALBUMIN QUANTITATIVE: CPT

## 2023-10-11 PROCEDURE — 80069 RENAL FUNCTION PANEL: CPT

## 2023-10-11 PROCEDURE — 82570 ASSAY OF URINE CREATININE: CPT

## 2023-10-16 ENCOUNTER — OFFICE VISIT (OUTPATIENT)
Dept: ORTHOPEDIC SURGERY | Age: 73
End: 2023-10-16
Payer: MEDICARE

## 2023-10-16 VITALS — BODY MASS INDEX: 33.27 KG/M2 | HEIGHT: 66 IN | WEIGHT: 207 LBS

## 2023-10-16 DIAGNOSIS — M75.52 SUBACROMIAL BURSITIS OF LEFT SHOULDER JOINT: ICD-10-CM

## 2023-10-16 DIAGNOSIS — M75.82 TENDINITIS OF LEFT ROTATOR CUFF: Primary | ICD-10-CM

## 2023-10-16 PROCEDURE — G8427 DOCREV CUR MEDS BY ELIG CLIN: HCPCS | Performed by: ORTHOPAEDIC SURGERY

## 2023-10-16 PROCEDURE — 1036F TOBACCO NON-USER: CPT | Performed by: ORTHOPAEDIC SURGERY

## 2023-10-16 PROCEDURE — 99213 OFFICE O/P EST LOW 20 MIN: CPT | Performed by: ORTHOPAEDIC SURGERY

## 2023-10-16 PROCEDURE — 3017F COLORECTAL CA SCREEN DOC REV: CPT | Performed by: ORTHOPAEDIC SURGERY

## 2023-10-16 PROCEDURE — 20610 DRAIN/INJ JOINT/BURSA W/O US: CPT | Performed by: ORTHOPAEDIC SURGERY

## 2023-10-16 PROCEDURE — G8417 CALC BMI ABV UP PARAM F/U: HCPCS | Performed by: ORTHOPAEDIC SURGERY

## 2023-10-16 PROCEDURE — G8484 FLU IMMUNIZE NO ADMIN: HCPCS | Performed by: ORTHOPAEDIC SURGERY

## 2023-10-16 PROCEDURE — 1123F ACP DISCUSS/DSCN MKR DOCD: CPT | Performed by: ORTHOPAEDIC SURGERY

## 2023-10-16 RX ORDER — TRIAMCINOLONE ACETONIDE 40 MG/ML
40 INJECTION, SUSPENSION INTRA-ARTICULAR; INTRAMUSCULAR ONCE
Status: COMPLETED | OUTPATIENT
Start: 2023-10-16 | End: 2023-10-16

## 2023-10-16 RX ORDER — ROPIVACAINE HYDROCHLORIDE 5 MG/ML
4 INJECTION, SOLUTION EPIDURAL; INFILTRATION; PERINEURAL ONCE
Status: COMPLETED | OUTPATIENT
Start: 2023-10-16 | End: 2023-10-16

## 2023-10-16 RX ADMIN — TRIAMCINOLONE ACETONIDE 40 MG: 40 INJECTION, SUSPENSION INTRA-ARTICULAR; INTRAMUSCULAR at 09:20

## 2023-10-16 RX ADMIN — ROPIVACAINE HYDROCHLORIDE 4 ML: 5 INJECTION, SOLUTION EPIDURAL; INFILTRATION; PERINEURAL at 09:20

## 2023-10-16 NOTE — PROGRESS NOTES
FOLLOW UP ORTHOPAEDIC NOTE    The patient follows up today for reevaluation of left shoulder pain. He is accompanied by his wife. The patient states 5/10 pain. He states that his sugars have been doing better and well controlled. He states that he is continue to have lateral based shoulder pain as well as anterior based shoulder pain. He denies it waking him up however he states pain with ADLs    PE:  AAOx3  RR  Unlabored breathing  Skin warm and moist  Focused physical examination of the left shoulder  Positive Neer positive Badillo 5/5 rotator cuff testing throughout with mild pain with rotator cuff testing. Remainder of examination unchanged    Labs: Hemoglobin A1c 6.8 on 9/15/2023     Diagnosis Orders   1. Tendinitis of left rotator cuff  ropivacaine (NAROPIN) 0.5% injection 4 mL    triamcinolone acetonide (KENALOG-40) injection 40 mg    60970 - IN DRAIN/INJECT LARGE JOINT/BURSA      2. Subacromial bursitis of left shoulder joint  ropivacaine (NAROPIN) 0.5% injection 4 mL    triamcinolone acetonide (KENALOG-40) injection 40 mg    30101 - IN DRAIN/INJECT LARGE JOINT/BURSA          Assessment and plan: 67 male with continued subjective symptoms of left shoulder pain with known, correlating diagnosis of left shoulder rotator cuff tendinitis/bursitis. -Time of 16 minutes was spent coordinating and discussing the clinical findings and diagnostic imaging results as they pertain to the patient's presenting subjective symptoms.  -I had a pleasant discussion with the patient and his wife today. I reviewed with him both that currently his clinical examination correlates above listed. I did review with them both consideration for corticosteroid injection of the point of maximal pain in the subacromial space to address above listed diagnoses.   Understand the risk and benefits of this he wishes to proceed  --The patient was educated to the risks (infection and skin blanching to name a few) and benefits of the

## 2023-10-19 LAB
CATARACTS: POSITIVE
DIABETIC RETINOPATHY: NORMAL
GLAUCOMA: NEGATIVE
INTRAOCULAR PRESSURE EYE: 16
INTRAOCULAR PRESSURE EYE: 16
VISUAL ACUITY DISTANCE LEFT EYE: NORMAL
VISUAL ACUITY DISTANCE RIGHT EYE: NORMAL

## 2023-10-30 ENCOUNTER — HOSPITAL ENCOUNTER (OUTPATIENT)
Age: 73
Discharge: HOME | End: 2023-10-30
Payer: MEDICARE

## 2023-10-30 LAB
ALBUMIN SERPL-MCNC: 4.4 G/DL (ref 3.4–5)
ANION GAP SERPL CALCULATED.3IONS-SCNC: 10 MMOL/L (ref 3–16)
BUN SERPL-MCNC: 22 MG/DL (ref 7–20)
CALCIUM SERPL-MCNC: 9.6 MG/DL (ref 8.3–10.6)
CHLORIDE SERPL-SCNC: 102 MMOL/L (ref 99–110)
CO2 SERPL-SCNC: 28 MMOL/L (ref 21–32)
CREAT SERPL-MCNC: 1.3 MG/DL (ref 0.8–1.3)
GFR SERPLBLD CREATININE-BSD FMLA CKD-EPI: 58 ML/MIN/{1.73_M2}
GLUCOSE SERPL-MCNC: 150 MG/DL (ref 70–99)
MAGNESIUM SERPL-MCNC: 1.8 MG/DL (ref 1.8–2.4)
PHOSPHATE SERPL-MCNC: 4.1 MG/DL (ref 2.5–4.9)
POTASSIUM SERPL-SCNC: 4.7 MMOL/L (ref 3.5–5.1)
SODIUM SERPL-SCNC: 140 MMOL/L (ref 136–145)

## 2023-10-30 PROCEDURE — 83735 ASSAY OF MAGNESIUM: CPT

## 2023-10-30 PROCEDURE — 80069 RENAL FUNCTION PANEL: CPT

## 2023-12-03 ENCOUNTER — PATIENT MESSAGE (OUTPATIENT)
Dept: FAMILY MEDICINE CLINIC | Age: 73
End: 2023-12-03

## 2023-12-04 ENCOUNTER — TELEPHONE (OUTPATIENT)
Dept: FAMILY MEDICINE CLINIC | Age: 73
End: 2023-12-04

## 2023-12-04 ENCOUNTER — PATIENT MESSAGE (OUTPATIENT)
Dept: FAMILY MEDICINE CLINIC | Age: 73
End: 2023-12-04

## 2023-12-04 NOTE — TELEPHONE ENCOUNTER
Patient called into nurse triage with complaints of upper left lung tightness, productive cough x3 days. Patient denies fever, chills,nausea, and sob. Patients states that his wife is in the hospital and he wants to make sure he is not have covid before he sees her. Patient scheduled for appointment 12/5/23. Instructed patient to arrive 15 minutes early and wear a mask for check in. Patient verbalized understanding.

## 2023-12-04 NOTE — TELEPHONE ENCOUNTER
From: Marcy Gates  To: Dr. Mando Ray: 12/3/2023 6:07 PM EST  Subject: Foot doctor recommendation    Good Evening     Would you recommend a foot doctor in the [facility] and or [address]

## 2023-12-05 ENCOUNTER — OFFICE VISIT (OUTPATIENT)
Dept: FAMILY MEDICINE CLINIC | Age: 73
End: 2023-12-05

## 2023-12-05 VITALS
SYSTOLIC BLOOD PRESSURE: 170 MMHG | WEIGHT: 204 LBS | TEMPERATURE: 97.9 F | OXYGEN SATURATION: 98 % | HEIGHT: 66 IN | DIASTOLIC BLOOD PRESSURE: 80 MMHG | BODY MASS INDEX: 32.78 KG/M2 | HEART RATE: 95 BPM

## 2023-12-05 DIAGNOSIS — H61.23 IMPACTED CERUMEN OF BOTH EARS: ICD-10-CM

## 2023-12-05 DIAGNOSIS — J22 LOWER RESPIRATORY INFECTION: Primary | ICD-10-CM

## 2023-12-05 RX ORDER — BENZONATATE 200 MG/1
200 CAPSULE ORAL 3 TIMES DAILY PRN
Qty: 21 CAPSULE | Refills: 0 | Status: SHIPPED | OUTPATIENT
Start: 2023-12-05 | End: 2023-12-12

## 2023-12-05 RX ORDER — AZITHROMYCIN 250 MG/1
250 TABLET, FILM COATED ORAL SEE ADMIN INSTRUCTIONS
Qty: 6 TABLET | Refills: 0 | Status: SHIPPED | OUTPATIENT
Start: 2023-12-05 | End: 2023-12-10

## 2023-12-05 ASSESSMENT — ENCOUNTER SYMPTOMS
SHORTNESS OF BREATH: 1
NAUSEA: 0
DIARRHEA: 0
RHINORRHEA: 0
SINUS PAIN: 0
VOMITING: 0
SINUS PRESSURE: 1
ABDOMINAL PAIN: 0
CHEST TIGHTNESS: 1
COUGH: 1
SORE THROAT: 0

## 2023-12-05 NOTE — PATIENT INSTRUCTIONS
Drink plenty of fluids   Get plenty of rest  Honey with tea  Humidifier, steamy showers  Flonase, tessalon  Take medications as prescribed   Go to nearest ER if develop shortness of breath, chest pain or significant worsening of symptoms

## 2023-12-05 NOTE — PROGRESS NOTES
Assisted the patient with placing his [place]bra [place] 2 sensor. He was having a hard time placing the sensor on the back of his arm. Nurse educated the patient to make an \"L\" with his arm for easier placement. Patient verbalized understanding.
He is alert and oriented to person, place, and time. Gait: Gait normal.          This note was generated completely or in part utilizing Dragon dictation speech recognition software. Occasionally, words are mistranscribed and despite editing, the text may contain inaccuracies due to incorrect word recognition. If further clarification is needed please contact the office at [phone]. An electronic signature was used to authenticate this note.     --MARLIN Lee - CNP

## 2023-12-06 LAB — SARS-COV-2 N GENE RESP QL NAA+PROBE: DETECTED

## 2023-12-15 NOTE — TELEPHONE ENCOUNTER
"Patient presents for prenatal visit at 15 weeks and 6 days.    Patient states vomiting has significantly improved.  She still does get some nausea and coughs up some phlegm.  Is starting to be able to keep foods down.  Had a milkshake and chicken nuggets prior to coming to the office and feels fine.  Viewed prenatal labs and it appears that type and screen was not drawn.  Will contact patient to go to the lab to have type and screen drawn.  Risk reducing cell free DNA male fetus.    Prenatal problem list:    Risk reducing cell free DNA, male    /74 (BP Location: Left arm, Patient Position: Sitting)   Ht 1.651 m (5' 5\")   Wt 56.4 kg   LMP 08/25/2023   BMI 20.70 kg/m²       Assessment and plan: Anatomy scan scheduled, continue to monitor diet.  Follow-up in 4 weeks.      " Refill Request     CONFIRM preferred pharmacy with the patient. If Mail Order Rx - Pend for 90 day refill. Last Seen: Last Seen Department: 3/9/2023  Last Seen by PCP: Visit date not found    Last Written: 4/06/2022, #200 each, 0 refills    If no future appointment scheduled:  Review the last OV with PCP and review information for follow-up visit,  Route STAFF MESSAGE with patient name to the [facility] for scheduling with the following information:            -  Timing of next visit           -  Visit type ie Physical, OV, etc           -  Diagnoses/Reason ie. COPD, HTN - Do not use MEDICATION, Follow-up or CHECK UP - Give reason for visit      Next Appointment:   Future Appointments   Date Time Provider Som Lucas   6/9/2023  9:30 AM MD HANDY Fregoso  [place] - [facility]   9/15/2023  9:30 AM MD HANDY Fregoso  [place] - [facility]       Message sent to [address] [place] [address] to schedule appt with patient?   NO      Requested Prescriptions     Pending Prescriptions Disp Refills    Insulin Syringe-Needle U-100 (INSULIN SYRINGE .5CC/31GX5/16\") 31G X 5/16\" 0.5 ML MISC [Pharmacy Med Name: MM SYRINGE 0.5ML/31G MIS] 200 each 0     Sig: Use 1 syring 4 times daily

## 2023-12-28 DIAGNOSIS — E11.649 UNCONTROLLED TYPE 2 DIABETES MELLITUS WITH HYPOGLYCEMIA WITHOUT COMA (HCC): ICD-10-CM

## 2023-12-28 NOTE — TELEPHONE ENCOUNTER
Refill Request     CONFIRM preferred pharmacy with the patient. If Mail Order Rx - Pend for 90 day refill. Last Seen: Last Seen Department: 12/5/2023  Last Seen by PCP: 9/15/2023    Last Written: 08/07/2023 20 mL 2 refills     If no future appointment scheduled:  Review the last OV with PCP and review information for follow-up visit,  Route STAFF MESSAGE with patient name to the [facility] for scheduling with the following information:            -  Timing of next visit           -  Visit type ie Physical, OV, etc           -  Diagnoses/Reason ie. COPD, HTN - Do not use MEDICATION, Follow-up or CHECK UP - Give reason for visit      Next Appointment:   Future Appointments   Date Time Provider [address] [place] [address]   1/9/2024 10:00 AM MD HANDY Gomez  Martha - [facility]   3/19/2024 11:00 AM MD HANDY Gomez  [place] - [facility]       Message sent to [address] [place] [address] to schedule appt with patient?   NO      Requested Prescriptions     Pending Prescriptions Disp Refills    insulin 70-30 (NOVOLIN 70/30 RELION) (70-30) 100 UNIT per ML injection vial [Pharmacy Med Name: NovoLIN 70/30 ReliOn (70-30) 100 UNIT/ML Subcutaneous Suspension] 20 mL 0     Sig: INJECT 25 UNITS SUBCUTANEOUSLY EVERY MORNING AND 10 TO 15 UNITS SUBCUTANEOUSLY ONCE NIGHTLY

## 2024-01-09 ENCOUNTER — OFFICE VISIT (OUTPATIENT)
Dept: FAMILY MEDICINE CLINIC | Age: 74
End: 2024-01-09
Payer: MEDICARE

## 2024-01-09 VITALS
HEART RATE: 63 BPM | DIASTOLIC BLOOD PRESSURE: 80 MMHG | SYSTOLIC BLOOD PRESSURE: 138 MMHG | WEIGHT: 203 LBS | BODY MASS INDEX: 32.77 KG/M2 | OXYGEN SATURATION: 99 %

## 2024-01-09 DIAGNOSIS — E11.22 CONTROLLED TYPE 2 DIABETES MELLITUS WITH STAGE 3 CHRONIC KIDNEY DISEASE, WITH LONG-TERM CURRENT USE OF INSULIN (HCC): ICD-10-CM

## 2024-01-09 DIAGNOSIS — Z12.5 SCREENING PSA (PROSTATE SPECIFIC ANTIGEN): ICD-10-CM

## 2024-01-09 DIAGNOSIS — N18.30 CONTROLLED TYPE 2 DIABETES MELLITUS WITH STAGE 3 CHRONIC KIDNEY DISEASE, WITH LONG-TERM CURRENT USE OF INSULIN (HCC): ICD-10-CM

## 2024-01-09 DIAGNOSIS — E78.5 DM TYPE 2 WITH DIABETIC DYSLIPIDEMIA (HCC): ICD-10-CM

## 2024-01-09 DIAGNOSIS — R97.20 ELEVATED PSA: ICD-10-CM

## 2024-01-09 DIAGNOSIS — I10 DIABETES MELLITUS WITH COINCIDENT HYPERTENSION (HCC): ICD-10-CM

## 2024-01-09 DIAGNOSIS — E11.65 UNCONTROLLED TYPE 2 DIABETES MELLITUS WITH HYPERGLYCEMIA (HCC): Primary | ICD-10-CM

## 2024-01-09 DIAGNOSIS — Z79.4 CONTROLLED TYPE 2 DIABETES MELLITUS WITH STAGE 3 CHRONIC KIDNEY DISEASE, WITH LONG-TERM CURRENT USE OF INSULIN (HCC): ICD-10-CM

## 2024-01-09 DIAGNOSIS — E11.21 DIABETIC NEPHROPATHY ASSOCIATED WITH TYPE 2 DIABETES MELLITUS (HCC): ICD-10-CM

## 2024-01-09 DIAGNOSIS — E11.69 DM TYPE 2 WITH DIABETIC DYSLIPIDEMIA (HCC): ICD-10-CM

## 2024-01-09 DIAGNOSIS — E11.3293 MILD NONPROLIFERATIVE DIABETIC RETINOPATHY OF BOTH EYES ASSOCIATED WITH TYPE 2 DIABETES MELLITUS, MACULAR EDEMA PRESENCE UNSPECIFIED (HCC): ICD-10-CM

## 2024-01-09 DIAGNOSIS — E11.9 DIABETES MELLITUS WITH COINCIDENT HYPERTENSION (HCC): ICD-10-CM

## 2024-01-09 DIAGNOSIS — E11.649 UNCONTROLLED TYPE 2 DIABETES MELLITUS WITH HYPOGLYCEMIA WITHOUT COMA (HCC): ICD-10-CM

## 2024-01-09 LAB
ALBUMIN SERPL-MCNC: 4.5 G/DL (ref 3.4–5)
ALBUMIN/GLOB SERPL: 2 {RATIO} (ref 1.1–2.2)
ALP SERPL-CCNC: 102 U/L (ref 40–129)
ALT SERPL-CCNC: 15 U/L (ref 10–40)
ANION GAP SERPL CALCULATED.3IONS-SCNC: 9 MMOL/L (ref 3–16)
AST SERPL-CCNC: 18 U/L (ref 15–37)
BASOPHILS # BLD: 0 K/UL (ref 0–0.2)
BASOPHILS NFR BLD: 0.7 %
BILIRUB SERPL-MCNC: 0.5 MG/DL (ref 0–1)
BUN SERPL-MCNC: 18 MG/DL (ref 7–20)
CALCIUM SERPL-MCNC: 9.1 MG/DL (ref 8.3–10.6)
CHLORIDE SERPL-SCNC: 103 MMOL/L (ref 99–110)
CHOLEST SERPL-MCNC: 301 MG/DL (ref 0–199)
CO2 SERPL-SCNC: 29 MMOL/L (ref 21–32)
CREAT SERPL-MCNC: 1.3 MG/DL (ref 0.8–1.3)
DEPRECATED RDW RBC AUTO: 13.2 % (ref 12.4–15.4)
EOSINOPHIL # BLD: 0.2 K/UL (ref 0–0.6)
EOSINOPHIL NFR BLD: 2.7 %
GFR SERPLBLD CREATININE-BSD FMLA CKD-EPI: 58 ML/MIN/{1.73_M2}
GLUCOSE SERPL-MCNC: 83 MG/DL (ref 70–99)
HBA1C MFR BLD: 9 %
HCT VFR BLD AUTO: 42.9 % (ref 40.5–52.5)
HDLC SERPL-MCNC: 60 MG/DL (ref 40–60)
HGB BLD-MCNC: 14.2 G/DL (ref 13.5–17.5)
LDLC SERPL CALC-MCNC: 210 MG/DL
LYMPHOCYTES # BLD: 1.8 K/UL (ref 1–5.1)
LYMPHOCYTES NFR BLD: 30.1 %
MCH RBC QN AUTO: 28.5 PG (ref 26–34)
MCHC RBC AUTO-ENTMCNC: 33.1 G/DL (ref 31–36)
MCV RBC AUTO: 86.3 FL (ref 80–100)
MONOCYTES # BLD: 0.4 K/UL (ref 0–1.3)
MONOCYTES NFR BLD: 7.5 %
NEUTROPHILS # BLD: 3.5 K/UL (ref 1.7–7.7)
NEUTROPHILS NFR BLD: 59 %
PLATELET # BLD AUTO: 291 K/UL (ref 135–450)
PMV BLD AUTO: 9 FL (ref 5–10.5)
POTASSIUM SERPL-SCNC: 4 MMOL/L (ref 3.5–5.1)
PROT SERPL-MCNC: 6.7 G/DL (ref 6.4–8.2)
PSA SERPL DL<=0.01 NG/ML-MCNC: 47.28 NG/ML (ref 0–4)
RBC # BLD AUTO: 4.98 M/UL (ref 4.2–5.9)
SODIUM SERPL-SCNC: 141 MMOL/L (ref 136–145)
TRIGL SERPL-MCNC: 153 MG/DL (ref 0–150)
TSH SERPL DL<=0.005 MIU/L-ACNC: 1.76 UIU/ML (ref 0.27–4.2)
VIT B12 SERPL-MCNC: 771 PG/ML (ref 211–911)
VLDLC SERPL CALC-MCNC: 31 MG/DL
WBC # BLD AUTO: 5.9 K/UL (ref 4–11)

## 2024-01-09 PROCEDURE — 3052F HG A1C>EQUAL 8.0%<EQUAL 9.0%: CPT | Performed by: FAMILY MEDICINE

## 2024-01-09 PROCEDURE — 1123F ACP DISCUSS/DSCN MKR DOCD: CPT | Performed by: FAMILY MEDICINE

## 2024-01-09 PROCEDURE — 2022F DILAT RTA XM EVC RTNOPTHY: CPT | Performed by: FAMILY MEDICINE

## 2024-01-09 PROCEDURE — G8427 DOCREV CUR MEDS BY ELIG CLIN: HCPCS | Performed by: FAMILY MEDICINE

## 2024-01-09 PROCEDURE — 3017F COLORECTAL CA SCREEN DOC REV: CPT | Performed by: FAMILY MEDICINE

## 2024-01-09 PROCEDURE — 1036F TOBACCO NON-USER: CPT | Performed by: FAMILY MEDICINE

## 2024-01-09 PROCEDURE — 83036 HEMOGLOBIN GLYCOSYLATED A1C: CPT | Performed by: FAMILY MEDICINE

## 2024-01-09 PROCEDURE — 3075F SYST BP GE 130 - 139MM HG: CPT | Performed by: FAMILY MEDICINE

## 2024-01-09 PROCEDURE — G8417 CALC BMI ABV UP PARAM F/U: HCPCS | Performed by: FAMILY MEDICINE

## 2024-01-09 PROCEDURE — G8484 FLU IMMUNIZE NO ADMIN: HCPCS | Performed by: FAMILY MEDICINE

## 2024-01-09 PROCEDURE — 3079F DIAST BP 80-89 MM HG: CPT | Performed by: FAMILY MEDICINE

## 2024-01-09 PROCEDURE — 99214 OFFICE O/P EST MOD 30 MIN: CPT | Performed by: FAMILY MEDICINE

## 2024-01-09 RX ORDER — SAXAGLIPTIN AND METFORMIN HYDROCHLORIDE 1000; 5 MG/1; MG/1
1 TABLET, FILM COATED, EXTENDED RELEASE ORAL DAILY
Qty: 90 TABLET | Refills: 3 | Status: SHIPPED | OUTPATIENT
Start: 2024-01-09

## 2024-01-09 ASSESSMENT — PATIENT HEALTH QUESTIONNAIRE - PHQ9: DEPRESSION UNABLE TO ASSESS: PT REFUSES

## 2024-01-10 NOTE — PROGRESS NOTES
side.        Posterior tibial pulses are 2+ on the right side and 2+ on the left side.   Musculoskeletal:      Right foot: Normal range of motion. No deformity.      Left foot: Normal range of motion. No deformity.   Feet:      Right foot:      Protective Sensation: 10 sites tested.  10 sites sensed.      Skin integrity: No ulcer, blister, skin breakdown, erythema, warmth, callus or dry skin.      Left foot:      Protective Sensation: 10 sites tested.  8 sites sensed.      Skin integrity: No ulcer, blister, skin breakdown, erythema, warmth, callus or dry skin.   Neurological:      Mental Status: He is alert.      Sensory: No sensory deficit.      Deep Tendon Reflexes:      Reflex Scores:       Patellar reflexes are 2+ on the right side and 2+ on the left side.       Achilles reflexes are 2+ on the right side and 2+ on the left side.     Comments: DM foot exam:  Skin intact, no erythema, rash or ulcerations.  Normal pulses (see details above), normal reflexes (see details above), normal monofilament and light touch sensation.     Psychiatric:         Speech: Speech normal.         Behavior: Behavior normal. Behavior is cooperative.          Results for POC orders placed in visit on 01/09/24   POCT glycosylated hemoglobin (Hb A1C)   Result Value Ref Range    Hemoglobin A1C 9.0 %           Lab Review   Office Visit on 12/05/2023   Component Date Value    SARS-CoV-2, PCR 12/05/2023 DETECTED (A)    Hospital Outpatient Visit on 10/30/2023   Component Date Value    Sodium 10/30/2023 140     Potassium 10/30/2023 4.7     Chloride 10/30/2023 102     CO2 10/30/2023 28     Anion Gap 10/30/2023 10     Glucose 10/30/2023 150 (H)     BUN 10/30/2023 22 (H)     Creatinine 10/30/2023 1.3     Est, Glom Filt Rate 10/30/2023 58 (A)     Calcium 10/30/2023 9.6     Phosphorus 10/30/2023 4.1     Albumin 10/30/2023 4.4     Magnesium 10/30/2023 1.80    Hospital Outpatient Visit on 10/11/2023   Component Date Value    Microalbumin, Random Uri*

## 2024-01-15 ENCOUNTER — PATIENT MESSAGE (OUTPATIENT)
Dept: FAMILY MEDICINE CLINIC | Age: 74
End: 2024-01-15

## 2024-01-16 ENCOUNTER — TELEPHONE (OUTPATIENT)
Dept: FAMILY MEDICINE CLINIC | Age: 74
End: 2024-01-16

## 2024-01-16 DIAGNOSIS — E11.65 UNCONTROLLED TYPE 2 DIABETES MELLITUS WITH HYPERGLYCEMIA, WITH LONG-TERM CURRENT USE OF INSULIN (HCC): ICD-10-CM

## 2024-01-16 DIAGNOSIS — E11.65 UNCONTROLLED TYPE 2 DIABETES MELLITUS WITH HYPERGLYCEMIA (HCC): ICD-10-CM

## 2024-01-16 DIAGNOSIS — E11.9 DIABETES MELLITUS WITH COINCIDENT HYPERTENSION (HCC): ICD-10-CM

## 2024-01-16 DIAGNOSIS — I10 DIABETES MELLITUS WITH COINCIDENT HYPERTENSION (HCC): ICD-10-CM

## 2024-01-16 DIAGNOSIS — K21.9 GASTROESOPHAGEAL REFLUX DISEASE WITHOUT ESOPHAGITIS: ICD-10-CM

## 2024-01-16 DIAGNOSIS — Z79.4 UNCONTROLLED TYPE 2 DIABETES MELLITUS WITH HYPERGLYCEMIA, WITH LONG-TERM CURRENT USE OF INSULIN (HCC): ICD-10-CM

## 2024-01-16 DIAGNOSIS — E11.69 DM TYPE 2 WITH DIABETIC DYSLIPIDEMIA (HCC): ICD-10-CM

## 2024-01-16 DIAGNOSIS — R05.3 CHRONIC COUGH: ICD-10-CM

## 2024-01-16 DIAGNOSIS — E78.5 DM TYPE 2 WITH DIABETIC DYSLIPIDEMIA (HCC): ICD-10-CM

## 2024-01-16 DIAGNOSIS — E11.21 DIABETIC NEPHROPATHY ASSOCIATED WITH TYPE 2 DIABETES MELLITUS (HCC): ICD-10-CM

## 2024-01-16 DIAGNOSIS — E11.649 UNCONTROLLED TYPE 2 DIABETES MELLITUS WITH HYPOGLYCEMIA WITHOUT COMA (HCC): ICD-10-CM

## 2024-01-16 NOTE — TELEPHONE ENCOUNTER
----- Message from Nithin Thapa sent at 1/16/2024 10:04 AM EST -----  Regarding: diabetic check up Jan. 9 Prescription refill ?  Contact: [phone]  Diabetic supplies to [facility]    The rest to Freya [facility]     Thank You    Terry

## 2024-01-16 NOTE — TELEPHONE ENCOUNTER
From: Nithin Thapa  Sent: 1/16/2024 11:04 AM EST  To: Greg Shah  Practice Support  Subject: diabetic check up Jan. 9 Prescription refill ?    Please Any help is appreciated     Zoey is a mess     Trying to get scripts refilled from [facility] to [facility] pharmacy says they cannot transfer a controlled substance Hydromorphone 2 MG ( Dilaudid ) ( Every 6 Hours as needed )has to come from a doctor     Do not have a clue who wrote the scripts at      We have one days worth left to administer to her.    Please help     Thank You    Terry

## 2024-01-16 NOTE — TELEPHONE ENCOUNTER
Refill Request     CONFIRM preferred pharmacy with the patient.    If Mail Order Rx - Pend for 90 day refill.      Last Seen: Last Seen Department: 1/9/2024  Last Seen by PCP: 1/9/2024    Last Written: 9/15/23 90 tabs 1 refill    If no future appointment scheduled:  Review the last OV with PCP and review information for follow-up visit,  Route STAFF MESSAGE with patient name to the  Pool for scheduling with the following information:            -  Timing of next visit           -  Visit type ie Physical, OV, etc           -  Diagnoses/Reason ie. COPD, HTN - Do not use MEDICATION, Follow-up or CHECK UP - Give reason for visit      Next Appointment:   Future Appointments   Date Time Provider Department Center   3/19/2024 11:00 AM Lizzy Nuñez MD [facility]  [place] [facility]       Message sent to  to schedule appt with patient?  NO      Requested Prescriptions     Pending Prescriptions Disp Refills    amLODIPine (NORVASC) 5 MG tablet 90 tablet 1     Sig: Take 1 tablet by mouth daily    atorvastatin (LIPITOR) 40 MG tablet 90 tablet 1     Sig: Take 1 tablet by mouth once daily    famotidine (PEPCID) 20 MG tablet 180 tablet 0     Sig: Take 1 tablet by mouth twice daily    losartan (COZAAR) 25 MG tablet 360 tablet 0     Sig: TAKE 2 TABLETS BY MOUTH 2 TIMES A DAY    omeprazole (PRILOSEC) 20 MG delayed release capsule 90 capsule 0     Sig: TAKE 1 CAPSULE BY MOUTH IN THE MORNING BEFORE BREAKFAST

## 2024-01-17 DIAGNOSIS — E11.649 UNCONTROLLED TYPE 2 DIABETES MELLITUS WITH HYPOGLYCEMIA WITHOUT COMA (HCC): ICD-10-CM

## 2024-01-17 DIAGNOSIS — E78.5 DM TYPE 2 WITH DIABETIC DYSLIPIDEMIA (HCC): ICD-10-CM

## 2024-01-17 DIAGNOSIS — E11.9 DIABETES MELLITUS WITH COINCIDENT HYPERTENSION (HCC): ICD-10-CM

## 2024-01-17 DIAGNOSIS — E11.69 DM TYPE 2 WITH DIABETIC DYSLIPIDEMIA (HCC): ICD-10-CM

## 2024-01-17 DIAGNOSIS — I10 DIABETES MELLITUS WITH COINCIDENT HYPERTENSION (HCC): ICD-10-CM

## 2024-01-17 DIAGNOSIS — E11.21 DIABETIC NEPHROPATHY ASSOCIATED WITH TYPE 2 DIABETES MELLITUS (HCC): ICD-10-CM

## 2024-01-17 RX ORDER — ATORVASTATIN CALCIUM 40 MG/1
TABLET, FILM COATED ORAL
Qty: 90 TABLET | Refills: 1 | Status: SHIPPED | OUTPATIENT
Start: 2024-01-17

## 2024-01-17 RX ORDER — LOSARTAN POTASSIUM 25 MG/1
TABLET ORAL
Qty: 360 TABLET | Refills: 1 | Status: SHIPPED | OUTPATIENT
Start: 2024-01-17

## 2024-01-17 RX ORDER — NAPROXEN SODIUM 220 MG
TABLET ORAL
Qty: 200 EACH | Refills: 5 | Status: SHIPPED | OUTPATIENT
Start: 2024-01-17

## 2024-01-17 RX ORDER — FAMOTIDINE 20 MG/1
TABLET, FILM COATED ORAL
Qty: 180 TABLET | Refills: 0 | OUTPATIENT
Start: 2024-01-17

## 2024-01-17 RX ORDER — GLUCOSAMINE HCL/CHONDROITIN SU 500-400 MG
1 CAPSULE ORAL
Qty: 300 STRIP | Refills: 3 | Status: SHIPPED | OUTPATIENT
Start: 2024-01-17

## 2024-01-17 RX ORDER — LANCETS 30 GAUGE
1 EACH MISCELLANEOUS 3 TIMES DAILY
Qty: 300 EACH | Refills: 3 | Status: SHIPPED | OUTPATIENT
Start: 2024-01-17

## 2024-01-17 RX ORDER — AMLODIPINE BESYLATE 5 MG/1
5 TABLET ORAL DAILY
Qty: 90 TABLET | Refills: 1 | Status: SHIPPED | OUTPATIENT
Start: 2024-01-17

## 2024-01-17 RX ORDER — OMEPRAZOLE 20 MG/1
CAPSULE, DELAYED RELEASE ORAL
Qty: 90 CAPSULE | Refills: 0 | OUTPATIENT
Start: 2024-01-17

## 2024-01-17 NOTE — TELEPHONE ENCOUNTER
Refill Request     CONFIRM preferred pharmacy with the patient.    If Mail Order Rx - Pend for 90 day refill.      Last Seen: Last Seen Department: 2024  Last Seen by PCP: 2024    Last Written: 9/15/23 3 refill    If no future appointment scheduled:  Review the last OV with PCP and review information for follow-up visit,  Route STAFF MESSAGE with patient name to the  Pool for scheduling with the following information:            -  Timing of next visit           -  Visit type ie Physical, OV, etc           -  Diagnoses/Reason ie. COPD, HTN - Do not use MEDICATION, Follow-up or CHECK UP - Give reason for visit      Next Appointment:   Future Appointments   Date Time Provider Department Center   3/19/2024 11:00 AM Lizzy Nuñez MD [facility]       Message sent to  to schedule appt with patient?  NO      Requested Prescriptions     Pending Prescriptions Disp Refills    blood glucose monitor strips 300 strip 3     Si strip by Other route 3 times daily (before meals) Freestyle Strips per patient. Dx: E11.65    insulin 70-30 (NOVOLIN 70/30 RELION) (70-30) 100 UNIT per ML injection vial 20 mL 0     Sig: INJECT 25 UNITS SUBCUTANEOUSLY EVERY MORNING AND 10 TO 15 UNITS SUBCUTANEOUSLY ONCE NIGHTLY    Insulin Syringe-Needle U-100 (INSULIN SYRINGE .5CC/31GX5/16\") 31G X 5/16\" 0.5 ML MISC 200 each 0     Sig: Use 1 syring 4 times daily    Lancets MISC 300 each 3     Si each by Other route 3 times daily Dx 250.02

## 2024-02-15 ENCOUNTER — PATIENT MESSAGE (OUTPATIENT)
Dept: FAMILY MEDICINE CLINIC | Age: 74
End: 2024-02-15

## 2024-02-15 DIAGNOSIS — E11.65 UNCONTROLLED TYPE 2 DIABETES MELLITUS WITH HYPERGLYCEMIA (HCC): Primary | ICD-10-CM

## 2024-02-20 NOTE — TELEPHONE ENCOUNTER
Called patient to discuss DM mariel.    Reviewed DM mariel expectations and program requirements. Patient verbalized understanding and is agreeable to participation.      Patient scheduled with RDN and Pharmacist. Referrals placed per protocol. SCODI assigned via [employer].     Documents faxed to nutrition adv.

## 2024-02-27 ENCOUNTER — TELEPHONE (OUTPATIENT)
Dept: FAMILY MEDICINE CLINIC | Age: 74
End: 2024-02-27

## 2024-02-27 NOTE — TELEPHONE ENCOUNTER
Patient was in the office for DM mariel program. While here he had a question regarding his Kombiglyze.     He is due to get it refilled but before he does, he states he was told by his nephrologist-- Dr Turner-- that he shouldn't take metformin due to his kidney function.     I do not see anything listed in his note regarding recommendations to stop metformin or metformin combination meds.     I called Dr Turner's office for clarification. Spoke with his medical assistant. She is sending him a message and will call me back to let me know what he says about Kombiglyze.

## 2024-02-27 NOTE — TELEPHONE ENCOUNTER
Received call back from Dr Turner's office. Per his MA, Dr Turner states the patient can follow PCPs recommendation for metformin, patient doesn't need to discontinue Kombiglyze at this time from a renal standpoint.     Dr Nuñez, before I inform the patient he is OK to take Kombiglyze per his nephrologist, I just want to confirm with you as well that he is to continue taking the medication as ordered.     Thank you!

## 2024-02-28 NOTE — TELEPHONE ENCOUNTER
Called patient, no answer, LV for patient informing him it is ok for him to take that medication and to call us if he needs anything else.

## 2024-03-12 ENCOUNTER — OFFICE VISIT (OUTPATIENT)
Dept: ENT CLINIC | Age: 74
End: 2024-03-12
Payer: MEDICARE

## 2024-03-12 VITALS
SYSTOLIC BLOOD PRESSURE: 144 MMHG | HEIGHT: 66 IN | BODY MASS INDEX: 32.62 KG/M2 | DIASTOLIC BLOOD PRESSURE: 70 MMHG | WEIGHT: 203 LBS | HEART RATE: 62 BPM

## 2024-03-12 DIAGNOSIS — H61.23 BILATERAL IMPACTED CERUMEN: Primary | ICD-10-CM

## 2024-03-12 DIAGNOSIS — H90.3 SENSORINEURAL HEARING LOSS (SNHL) OF BOTH EARS: ICD-10-CM

## 2024-03-12 PROCEDURE — 99213 OFFICE O/P EST LOW 20 MIN: CPT | Performed by: STUDENT IN AN ORGANIZED HEALTH CARE EDUCATION/TRAINING PROGRAM

## 2024-03-12 PROCEDURE — 69210 REMOVE IMPACTED EAR WAX UNI: CPT | Performed by: STUDENT IN AN ORGANIZED HEALTH CARE EDUCATION/TRAINING PROGRAM

## 2024-03-12 PROCEDURE — 1036F TOBACCO NON-USER: CPT | Performed by: STUDENT IN AN ORGANIZED HEALTH CARE EDUCATION/TRAINING PROGRAM

## 2024-03-12 PROCEDURE — 1123F ACP DISCUSS/DSCN MKR DOCD: CPT | Performed by: STUDENT IN AN ORGANIZED HEALTH CARE EDUCATION/TRAINING PROGRAM

## 2024-03-12 PROCEDURE — 3017F COLORECTAL CA SCREEN DOC REV: CPT | Performed by: STUDENT IN AN ORGANIZED HEALTH CARE EDUCATION/TRAINING PROGRAM

## 2024-03-12 PROCEDURE — 3078F DIAST BP <80 MM HG: CPT | Performed by: STUDENT IN AN ORGANIZED HEALTH CARE EDUCATION/TRAINING PROGRAM

## 2024-03-12 PROCEDURE — G8484 FLU IMMUNIZE NO ADMIN: HCPCS | Performed by: STUDENT IN AN ORGANIZED HEALTH CARE EDUCATION/TRAINING PROGRAM

## 2024-03-12 PROCEDURE — 3077F SYST BP >= 140 MM HG: CPT | Performed by: STUDENT IN AN ORGANIZED HEALTH CARE EDUCATION/TRAINING PROGRAM

## 2024-03-12 PROCEDURE — G8417 CALC BMI ABV UP PARAM F/U: HCPCS | Performed by: STUDENT IN AN ORGANIZED HEALTH CARE EDUCATION/TRAINING PROGRAM

## 2024-03-12 PROCEDURE — G8427 DOCREV CUR MEDS BY ELIG CLIN: HCPCS | Performed by: STUDENT IN AN ORGANIZED HEALTH CARE EDUCATION/TRAINING PROGRAM

## 2024-03-12 ASSESSMENT — ENCOUNTER SYMPTOMS
NAUSEA: 0
VOMITING: 0
SHORTNESS OF BREATH: 0
RHINORRHEA: 0
EYE PAIN: 0
COUGH: 0

## 2024-03-12 NOTE — PROGRESS NOTES
[facility]  DIVISION OF OTOLARYNGOLOGY- HEAD & NECK SURGERY  CONSULT      Patient Name: Nithin Thapa  Medical Record Number:  [id number]  Primary Care Physician:  Lizzy Nuñez MD  Date of Consultation: 3/12/2024    Chief Complaint:   Chief Complaint   Patient presents with    Cerumen Impaction     Left seems worse       HISTORY OF PRESENT ILLNESS  Nithin is a(n) 73 y.o. male who presents with cerumen impaction and hearing loss.  He is here today for cerumen removal and audiogram.  He denies any changes.  His hearing has been stable from previous evaluation.  He did bring with him hearing aids which he purchased through the mail.    Interval history  Has been approximately 1 year since I last saw Terry.  He states his ear has been clogged for several months.  He is not overly concerned with his hearing at this point time.    Interval History 7/11/2022  Terry returns for cerumen management.  He states that he has been having some fullness and feels that his left ear is more full than the right.  He has had decrease in hearing with the wax buildup.  He did get hearing aids but has not started using them yet    Interval History 1/16/2023  Terry presents for wax management.  He states both ears feel full.  He states his hearing is slightly decreased from what it normally is.    Interval History 3/12/2024  Terry is having trouble hearing secondary to wax impaction.    Patient Active Problem List   Diagnosis    Diabetes mellitus with coincident hypertension (HCC)    GERD (gastroesophageal reflux disease)    Hyperlipidemia    Erectile dysfunction    Low back pain    Hypotestosteronism    Uncontrolled type 2 diabetes mellitus with hyperglycemia (HCC)    Mild nonproliferative diabetic retinopathy (HCC)    Diabetic retinopathy (HCC)    Sensorineural hearing loss, bilateral    Elevated PSA    Diabetic hypoglycemia (HCC)    Chronic cough    DM type 2 with diabetic dyslipidemia (HCC)    Type 2 diabetes mellitus with obesity

## 2024-03-16 ASSESSMENT — PATIENT HEALTH QUESTIONNAIRE - PHQ9
1. LITTLE INTEREST OR PLEASURE IN DOING THINGS: NOT AT ALL
SUM OF ALL RESPONSES TO PHQ9 QUESTIONS 1 & 2: 0
1. LITTLE INTEREST OR PLEASURE IN DOING THINGS: NOT AT ALL
SUM OF ALL RESPONSES TO PHQ QUESTIONS 1-9: 0
SUM OF ALL RESPONSES TO PHQ QUESTIONS 1-9: 0
2. FEELING DOWN, DEPRESSED OR HOPELESS: NOT AT ALL
2. FEELING DOWN, DEPRESSED OR HOPELESS: NOT AT ALL
SUM OF ALL RESPONSES TO PHQ QUESTIONS 1-9: 0
SUM OF ALL RESPONSES TO PHQ9 QUESTIONS 1 & 2: 0
SUM OF ALL RESPONSES TO PHQ QUESTIONS 1-9: 0

## 2024-03-19 ENCOUNTER — OFFICE VISIT (OUTPATIENT)
Dept: FAMILY MEDICINE CLINIC | Age: 74
End: 2024-03-19

## 2024-03-19 VITALS
DIASTOLIC BLOOD PRESSURE: 58 MMHG | WEIGHT: 204 LBS | SYSTOLIC BLOOD PRESSURE: 128 MMHG | OXYGEN SATURATION: 96 % | BODY MASS INDEX: 32.93 KG/M2 | HEART RATE: 86 BPM

## 2024-03-19 DIAGNOSIS — R97.20 ELEVATED PSA: ICD-10-CM

## 2024-03-19 DIAGNOSIS — E11.65 UNCONTROLLED TYPE 2 DIABETES MELLITUS WITH HYPERGLYCEMIA (HCC): Primary | ICD-10-CM

## 2024-03-19 DIAGNOSIS — N18.31 CHRONIC RENAL FAILURE, STAGE 3A (HCC): ICD-10-CM

## 2024-03-19 DIAGNOSIS — E78.5 HYPERLIPIDEMIA, UNSPECIFIED HYPERLIPIDEMIA TYPE: ICD-10-CM

## 2024-03-19 DIAGNOSIS — Z78.9 PATIENT UNABLE TO OBTAIN MEDICATION: ICD-10-CM

## 2024-03-19 DIAGNOSIS — E78.5 DM TYPE 2 WITH DIABETIC DYSLIPIDEMIA (HCC): ICD-10-CM

## 2024-03-19 DIAGNOSIS — E11.69 DM TYPE 2 WITH DIABETIC DYSLIPIDEMIA (HCC): ICD-10-CM

## 2024-03-19 DIAGNOSIS — E11.3293 MILD NONPROLIFERATIVE DIABETIC RETINOPATHY OF BOTH EYES ASSOCIATED WITH TYPE 2 DIABETES MELLITUS, MACULAR EDEMA PRESENCE UNSPECIFIED (HCC): ICD-10-CM

## 2024-03-19 DIAGNOSIS — I10 DIABETES MELLITUS WITH COINCIDENT HYPERTENSION (HCC): ICD-10-CM

## 2024-03-19 DIAGNOSIS — E11.9 DIABETES MELLITUS WITH COINCIDENT HYPERTENSION (HCC): ICD-10-CM

## 2024-03-19 LAB — HBA1C MFR BLD: 8.4 %

## 2024-03-20 ENCOUNTER — TELEPHONE (OUTPATIENT)
Dept: FAMILY MEDICINE CLINIC | Age: 74
End: 2024-03-20

## 2024-03-20 NOTE — TELEPHONE ENCOUNTER
Patient notified office that linagliptin-metformin is still too expensive. It was $1600. The patient states that the pharmacy informed them the generic is not available yet, only brand.     Is there anything else you would like the patient to try?

## 2024-03-20 NOTE — TELEPHONE ENCOUNTER
He was getting patient assistance for Kombiglyze, but states the program no longer exists - can you verify?  I would like to keep him on Kombiglyze if we able to via patient assistance as it worked very well for him.

## 2024-03-20 NOTE — PROGRESS NOTES
Nithin Thapa (:  1950) is a 73 y.o. male,Established patient, here for evaluation of the following chief complaint(s):  6 Month Follow-Up         ASSESSMENT/PLAN:  1. Uncontrolled type 2 diabetes mellitus with hyperglycemia (HCC)  -     POCT glycosylated hemoglobin (Hb A1C)  -     linaGLIPtin-metFORMIN HCl ER 5-1000 MG TB24; Take 1 tablet by mouth daily, Disp-90 tablet, R-1Normal  Uncontrolled. Currently only on insulin 70/30 and Farxiga. He has not been taking Kombiglyze as he cannot afford it and patient assistance program is no longer available. Will see if we can change him to generic linagliptin-metformin instead. He has not tolerated GLP-1 agonists in the past with Victoza, but may consider a newer option such as Ozempic or Mounjaro once they are available in pharmacies again.   2. Elevated PSA  -     External Referral To Urology  -     PSA, TOTAL AND FREE; Future  Discussed with the patient his level of PSA is highly concerning of prostate cancer. He is open to seeing a different urologist outside of [facility] as he was not happy with his experience there, but remains steadfast that he opposed to getting a biopsy done. We will check total and free PSA today to further assess. Referral placed to Dr. Alexander for further evaluation and management. Discussed the seriousness of this level and the need to move forward with some plan of action.   3. Hyperlipidemia, unspecified hyperlipidemia type  -     Lipid Panel; Future  Last lipids were very uncontrolled due to non-adherence to the Lipitor. Will recheck now that he is taking it more regularly again.   4. Mild nonproliferative diabetic retinopathy of both eyes associated with type 2 diabetes mellitus, macular edema presence unspecified (HCC)  Going back to [facility] for treatment again. Need to get glucose back down to help prevent worsening of retinopathy. Will see if we can get DPP4 and/or GLP for him.   5. Diabetes mellitus with coincident

## 2024-03-21 NOTE — TELEPHONE ENCOUNTER
After reviewing patient assistance options, there isn't anything for Kombiglyze. However, I found support for Jentadueto through [employer].     I will call and discuss applying with patient

## 2024-03-21 NOTE — TELEPHONE ENCOUNTER
Forms have been faxed and received confirmation that fax went through.    Called patient back, needing assistance with locating abx for his wife. See Zoey Magana's chart for further information.

## 2024-03-21 NOTE — TELEPHONE ENCOUNTER
Called patient, discussed applying for patient assistance. Patient agreeable to applying. I will start the paperwork and patient will come in to sign.

## 2024-03-22 ENCOUNTER — HOSPITAL ENCOUNTER (OUTPATIENT)
Age: 74
Discharge: HOME | End: 2024-03-22
Payer: MEDICARE

## 2024-03-22 LAB
CHOLEST SERPL-MCNC: 172 MG/DL (ref 0–199)
HDLC SERPL-MCNC: 61 MG/DL (ref 40–60)
LDLC SERPL CALC-MCNC: 85 MG/DL
TRIGL SERPL-MCNC: 130 MG/DL (ref 0–150)
VLDLC SERPL CALC-MCNC: 26 MG/DL

## 2024-03-22 PROCEDURE — 84154 ASSAY OF PSA FREE: CPT

## 2024-03-22 PROCEDURE — 80061 LIPID PANEL: CPT

## 2024-03-22 PROCEDURE — 36415 COLL VENOUS BLD VENIPUNCTURE: CPT

## 2024-03-26 NOTE — TELEPHONE ENCOUNTER
Received notification from [facility] that patient was approved for program until 12/31/24.    Called patient, and informed of approval. Patient states that he has already received the medication. I scheduled patient for pharmacy appointment as part of DM mariel.

## 2024-03-27 LAB
PROSTATIC SPECIFIC AG: 39.4 NG/ML (ref 0–4)
PSA FREE MFR SERPL: 11.4 %
PSA FREE SERPL-MCNC: 4.5 UG/L

## 2024-04-04 ENCOUNTER — PHARMACY VISIT (OUTPATIENT)
Dept: FAMILY MEDICINE CLINIC | Age: 74
End: 2024-04-04

## 2024-04-04 DIAGNOSIS — E11.9 DIABETES MELLITUS WITH COINCIDENT HYPERTENSION (HCC): Primary | ICD-10-CM

## 2024-04-04 DIAGNOSIS — I10 DIABETES MELLITUS WITH COINCIDENT HYPERTENSION (HCC): Primary | ICD-10-CM

## 2024-04-09 ENCOUNTER — ENROLLMENT (OUTPATIENT)
Dept: FAMILY MEDICINE CLINIC | Age: 74
End: 2024-04-09

## 2024-04-09 NOTE — PROGRESS NOTES
Needs appt scheduled with me at the end of June or early July please for DM follow up.  
Due 6/2024   eGFR 2/12/2024 Annually up to date   Lipids 3/22/2024 Annually  up to date     Patient verbalized understanding of the information presented and all of the patient's questions were answered. Reviewed any medication changes with patient at length. Patient advised to call the office with any additional questions or concerns.     Return to clinic in ~3 months for follow up - will follow-up after next A1c is due (June) and schedule follow-up based on results.   Next PCP follow-up: not scheduled - plan for 6/19/24 per note but not scheduled    Thank you for the consult,     Katelyn RobinD  Ambulatory Clinical Pharmacist   Specialty Medication Services   Phone: [phone] option 4  4/9/2024 12:42 PM    For Pharmacy Admin Tracking Only    Program: Medical Group  CPA in place:  Yes  Recommendation Provided To: Patient/Caregiver: 2 via In person  Intervention Detail: Device Training and Vaccine Recommended/Administered  Intervention Accepted By: Patient/Caregiver: 1   Time Spent (min):  90

## 2024-04-10 ENCOUNTER — TELEPHONE (OUTPATIENT)
Dept: FAMILY MEDICINE CLINIC | Age: 74
End: 2024-04-10

## 2024-04-10 DIAGNOSIS — R19.7 DIARRHEA OF PRESUMED INFECTIOUS ORIGIN: Primary | ICD-10-CM

## 2024-04-10 NOTE — TELEPHONE ENCOUNTER
----- Message from Lizzy Nuñez MD sent at 4/9/2024  2:43 PM EDT -----        ----- Message -----  From: Padmini Lowry [facility]  Sent: 4/9/2024  12:35 PM EDT  To: Lizzy Nuñez MD    [facility] Dr. Nuñez,  I saw this patient on 4/4/24 for the diabetes landon. Please seen note for full details.   Most recent A1c complete on 3/19/2024 - 8.4% (down from 9.0%)  Labs ordered: none patient is up to date on all labs. Could check lipids sooner than 1 year to see if LDL decreased now that adherent to statin.   Medication changes made: none since he has just started back on all his medications. His goal is to stop the metformin - discussed getting A1c under control and then assessing removing medications.   Recommendations/other: Shingrix and Hepatitis B vaccines, patient not interested.  Patient's next appointment with you is not currently scheduled, per last office visit is due in June for follow-up/repeat A1c.    Thank you!  Padmini Lowry, pharmD  Ambulatory Care Pharmacist   Specialty Medication Service Program  Diabetes [place] [facility] - [facility]  [phone]

## 2024-04-10 NOTE — TELEPHONE ENCOUNTER
Plan:    - Stool specimen for GI Path and CDiff - ordered  - Increase hydration  - Cleaning with bleach and handwashing encouraged    Please approve actions by nurse.       Patient texts this am that he has loose, foul smelling stools.    Stool specimen tubes and supplies obtained to give to patient to test for Cdiff and pathogens d/t wife having Cdiff.    Reports they are cleaning home with bleach.  Request patient to call this RN to arrange  of specimen/supplies and to obtain more information.        Pt came to office.  Advised on specimen needing to be refrigerated and one being at room temp.  Advised on how to collect specimen and reviewed cleaning techniques.  Advised to bring specimens to the lab.  Orders entered.      Patient voices understanding.

## 2024-04-10 NOTE — TELEPHONE ENCOUNTER
Ok to do VV, in person, or Evisit, but this needs some type of assessment before plan can be formulated (including orders for testing).

## 2024-04-11 DIAGNOSIS — R19.7 DIARRHEA OF PRESUMED INFECTIOUS ORIGIN: ICD-10-CM

## 2024-04-11 NOTE — TELEPHONE ENCOUNTER
Scheduled as below.    Will be seen tomorrow and will be seen in Fast Track    Future Appointments   Date Time Provider Department Center   4/12/2024 10:00 AM Yadira Redmond APRN - CNP [facility] - [facility]   7/9/2024  9:30 AM Lizzy Nuñez MD [facility]   9/9/2024  1:00 PM Corey Szymanski DO [facility]

## 2024-04-12 ENCOUNTER — OFFICE VISIT (OUTPATIENT)
Dept: FAMILY MEDICINE CLINIC | Age: 74
End: 2024-04-12

## 2024-04-12 VITALS
BODY MASS INDEX: 32.78 KG/M2 | SYSTOLIC BLOOD PRESSURE: 142 MMHG | DIASTOLIC BLOOD PRESSURE: 70 MMHG | TEMPERATURE: 97.7 F | WEIGHT: 204 LBS | OXYGEN SATURATION: 98 % | HEIGHT: 66 IN | HEART RATE: 68 BPM

## 2024-04-12 DIAGNOSIS — A08.4 VIRAL GASTROENTERITIS: ICD-10-CM

## 2024-04-12 DIAGNOSIS — R19.7 DIARRHEA, UNSPECIFIED TYPE: Primary | ICD-10-CM

## 2024-04-12 LAB
C DIFF TOX A+B STL QL IA: NORMAL
GI PATHOGENS PNL STL NAA+PROBE: NORMAL

## 2024-04-12 RX ORDER — ZINC OXIDE 13 %
1 CREAM (GRAM) TOPICAL DAILY
Qty: 30 CAPSULE | Refills: 0 | Status: CANCELLED | OUTPATIENT
Start: 2024-04-12

## 2024-04-12 ASSESSMENT — ENCOUNTER SYMPTOMS
NAUSEA: 0
RECTAL PAIN: 0
VOMITING: 0
CONSTIPATION: 0
BACK PAIN: 0
DIARRHEA: 1
BLOOD IN STOOL: 0
ABDOMINAL PAIN: 0

## 2024-04-12 NOTE — PATIENT INSTRUCTIONS
Clear liquids then advance to bland, soft diet such as BRAT diet, then slowly advance to regular as tolerated  Avoid fried, fatty, processed and spicy foods   Drink plenty of fluids   Go to ER for significant worsening of symptoms, intractable vomiting, signs of dehydration (dry mouth, decreased urine output, heart racing), shortness of breath, chest pain   Notify office if symptoms do not improve    The most important treatment is getting enough fluid. That's because diarrhea can cause the body to lose fluid. Adults with mild diarrhea can drink lots of fluids with water, salt, and sugar. Soup broth and water mixed with juice are good choices. If you are drinking enough, your urine will look light yellow or almost clear.  If you have very frequent diarrhea, you can drink an \"oral rehydration solution.\" You can buy this in a packet at the pharmacy. Mix it with bottled or boiled drinking water.

## 2024-04-12 NOTE — PROGRESS NOTES
Nithin Thapa (:  1950) is a 73 y.o. male,Established patient, here for evaluation of the following chief complaint(s):  Diarrhea (Sxs 4/10/2024)         ASSESSMENT/PLAN:  1. Diarrhea, unspecified type  2. Viral gastroenteritis    -C-diff previously ordered and performed yesterday and is negative. Gi pathogens pending at this time.   -Reviewed allergies, discussed medication risks, benefits, side effects. Take medication with food.   -Reviewed symptom management   -Reviewed alarm symptoms    Patient Instructions   Clear liquids then advance to bland, soft diet such as BRAT diet, then slowly advance to regular as tolerated  Avoid fried, fatty, processed and spicy foods   Drink plenty of fluids   Go to ER for significant worsening of symptoms, intractable vomiting, signs of dehydration (dry mouth, decreased urine output, heart racing), shortness of breath, chest pain   Notify office if symptoms do not improve    The most important treatment is getting enough fluid. That's because diarrhea can cause the body to lose fluid. Adults with mild diarrhea can drink lots of fluids with water, salt, and sugar. Soup broth and water mixed with juice are good choices. If you are drinking enough, your urine will look light yellow or almost clear.  If you have very frequent diarrhea, you can drink an \"oral rehydration solution.\" You can buy this in a packet at the pharmacy. Mix it with bottled or boiled drinking water.     Return if symptoms worsen or fail to improve.         Subjective   SUBJECTIVE/OBJECTIVE:  Reports symptoms started two days ago.  Diarrhea lasted 24 hours and seem to have resolved. No bowel movement today.   Reports loose stool  Denies N/V, able to tolerate food and drink         Diarrhea   Pertinent negatives include no abdominal pain, chills, fever or vomiting.       Review of Systems   Constitutional:  Negative for chills and fever.   Gastrointestinal:  Positive for diarrhea. Negative for abdominal

## 2024-06-21 ENCOUNTER — HOSPITAL ENCOUNTER (OUTPATIENT)
Age: 74
Discharge: HOME | End: 2024-06-21
Payer: MEDICARE

## 2024-06-21 LAB
25(OH)D3 SERPL-MCNC: 30.1 NG/ML
ALBUMIN SERPL-MCNC: 4.5 G/DL (ref 3.4–5)
ANION GAP SERPL CALCULATED.3IONS-SCNC: 12 MMOL/L (ref 3–16)
BILIRUB UR QL STRIP.AUTO: NEGATIVE
BUN SERPL-MCNC: 24 MG/DL (ref 7–20)
CALCIUM SERPL-MCNC: 9.6 MG/DL (ref 8.3–10.6)
CHLORIDE SERPL-SCNC: 102 MMOL/L (ref 99–110)
CLARITY UR: CLEAR
CO2 SERPL-SCNC: 27 MMOL/L (ref 21–32)
COLOR UR: YELLOW
CREAT SERPL-MCNC: 1.4 MG/DL (ref 0.8–1.3)
CREAT UR-MCNC: 82.2 MG/DL (ref 39–259)
EPI CELLS #/AREA URNS HPF: NORMAL /HPF (ref 0–5)
GFR SERPLBLD CREATININE-BSD FMLA CKD-EPI: 53 ML/MIN/{1.73_M2}
GLUCOSE SERPL-MCNC: 105 MG/DL (ref 70–99)
GLUCOSE UR STRIP.AUTO-MCNC: >=1000 MG/DL
HGB UR QL STRIP.AUTO: NEGATIVE
KETONES UR STRIP.AUTO-MCNC: NEGATIVE MG/DL
LEUKOCYTE ESTERASE UR QL STRIP.AUTO: NEGATIVE
MICROALBUMIN UR DL<=1MG/L-MCNC: 55.3 MG/DL
MICROALBUMIN/CREAT UR: 672.7 MG/G (ref 0–30)
NITRITE UR QL STRIP.AUTO: NEGATIVE
PH UR STRIP.AUTO: 6 [PH] (ref 5–8)
PHOSPHATE SERPL-MCNC: 4 MG/DL (ref 2.5–4.9)
POTASSIUM SERPL-SCNC: 4.4 MMOL/L (ref 3.5–5.1)
PROT UR STRIP.AUTO-MCNC: 100 MG/DL
PROT UR-MCNC: 81 MG/DL
PROT/CREAT UR-RTO: 1 MG/DL
RBC #/AREA URNS HPF: NORMAL /HPF (ref 0–4)
SODIUM SERPL-SCNC: 141 MMOL/L (ref 136–145)
SP GR UR STRIP.AUTO: 1.02 (ref 1–1.03)
UA DIPSTICK W REFLEX MICRO PNL UR: YES
URATE SERPL-MCNC: 6.7 MG/DL (ref 3.5–7.2)
URN SPEC COLLECT METH UR: ABNORMAL
UROBILINOGEN UR STRIP-ACNC: 0.2 E.U./DL
WBC #/AREA URNS HPF: NORMAL /HPF (ref 0–5)

## 2024-06-21 PROCEDURE — 82043 UR ALBUMIN QUANTITATIVE: CPT

## 2024-06-21 PROCEDURE — 36415 COLL VENOUS BLD VENIPUNCTURE: CPT

## 2024-06-21 PROCEDURE — 86039 ANTINUCLEAR ANTIBODIES (ANA): CPT

## 2024-06-21 PROCEDURE — 84156 ASSAY OF PROTEIN URINE: CPT

## 2024-06-21 PROCEDURE — 86038 ANTINUCLEAR ANTIBODIES: CPT

## 2024-06-21 PROCEDURE — 81001 URINALYSIS AUTO W/SCOPE: CPT

## 2024-06-21 PROCEDURE — 86255 FLUORESCENT ANTIBODY SCREEN: CPT

## 2024-06-21 PROCEDURE — 80069 RENAL FUNCTION PANEL: CPT

## 2024-06-21 PROCEDURE — 82306 VITAMIN D 25 HYDROXY: CPT

## 2024-06-21 PROCEDURE — 84550 ASSAY OF BLOOD/URIC ACID: CPT

## 2024-06-21 PROCEDURE — 82570 ASSAY OF URINE CREATININE: CPT

## 2024-06-22 LAB — ANA SER QL IA: POSITIVE

## 2024-06-26 LAB
ANA PATTERN: ABNORMAL
ANA TITER: ABNORMAL
ANTINUCLEAR AB INTERPRETIVE COMMENT: ABNORMAL
NUCLEAR IGG SER QL IF: DETECTED

## 2024-07-02 DIAGNOSIS — J20.9 ACUTE BRONCHITIS WITH BRONCHOSPASM: ICD-10-CM

## 2024-07-02 DIAGNOSIS — K21.9 GASTROESOPHAGEAL REFLUX DISEASE WITHOUT ESOPHAGITIS: ICD-10-CM

## 2024-07-03 RX ORDER — ALBUTEROL SULFATE 2.5 MG/3ML
2.5 SOLUTION RESPIRATORY (INHALATION) EVERY 4 HOURS PRN
Qty: 120 EACH | Refills: 5 | Status: SHIPPED | OUTPATIENT
Start: 2024-07-03

## 2024-07-03 RX ORDER — TADALAFIL 5 MG/1
5 TABLET ORAL DAILY
Qty: 90 TABLET | Refills: 3 | Status: SHIPPED | OUTPATIENT
Start: 2024-07-03

## 2024-07-03 RX ORDER — FAMOTIDINE 20 MG/1
TABLET, FILM COATED ORAL
Qty: 180 TABLET | Refills: 3 | Status: SHIPPED | OUTPATIENT
Start: 2024-07-03

## 2024-07-03 NOTE — TELEPHONE ENCOUNTER
Refill Request     CONFIRM preferred pharmacy with the patient.    If Mail Order Rx - Pend for 90 day refill.      Last Seen: Last Seen Department: 4/12/2024  Last Seen by PCP: 6/21/2022    Last Written: 6/21/22 180 with no refills     If no future appointment scheduled:  Review the last OV with PCP and review information for follow-up visit,  Route STAFF MESSAGE with patient name to the  Pool for scheduling with the following information:            -  Timing of next visit           -  Visit type ie Physical, OV, etc           -  Diagnoses/Reason ie. COPD, HTN - Do not use MEDICATION, Follow-up or CHECK UP - Give reason for visit      Next Appointment:   Future Appointments   Date Time Provider Department Center   7/9/2024  9:30 AM Lizzy Nuñez MD [facility] [place] - [facility]   9/9/2024  1:00 PM Corey Szymanski DO [facility]       Message sent to  to schedule appt with patient?  NO      Requested Prescriptions     Pending Prescriptions Disp Refills    famotidine (PEPCID) 20 MG tablet 180 tablet 0     Sig: Take 1 tablet by mouth twice daily

## 2024-07-03 NOTE — TELEPHONE ENCOUNTER
Refill Request     CONFIRM preferred pharmacy with the patient.    If Mail Order Rx - Pend for 90 day refill.      Last Seen: Last Seen Department: 4/12/2024  Last Seen by PCP: 3/19/2024    Last Written: 3/11/2021    If no future appointment scheduled:  Review the last OV with PCP and review information for follow-up visit,  Route STAFF MESSAGE with patient name to the  Pool for scheduling with the following information:            -  Timing of next visit           -  Visit type ie Physical, OV, etc           -  Diagnoses/Reason ie. COPD, HTN - Do not use MEDICATION, Follow-up or CHECK UP - Give reason for visit      Next Appointment:   Future Appointments   Date Time Provider Department Center   7/9/2024  9:30 AM Lizzy Nuñez MD [facility] - [facility]   9/9/2024  1:00 PM Corey Szymanski DO [facility]       Message sent to  to schedule appt with patient?  NO      Requested Prescriptions     Pending Prescriptions Disp Refills    albuterol (PROVENTIL) (2.5 MG/3ML) 0.083% nebulizer solution 120 each 5     Sig: Take 3 mLs by nebulization every 4 hours as needed for Wheezing or Shortness of Breath    tadalafil (CIALIS) 5 MG tablet 90 tablet 3     Sig: Take 1 tablet by mouth daily

## 2024-07-08 SDOH — ECONOMIC STABILITY: INCOME INSECURITY: HOW HARD IS IT FOR YOU TO PAY FOR THE VERY BASICS LIKE FOOD, HOUSING, MEDICAL CARE, AND HEATING?: VERY HARD

## 2024-07-08 SDOH — ECONOMIC STABILITY: FOOD INSECURITY: WITHIN THE PAST 12 MONTHS, YOU WORRIED THAT YOUR FOOD WOULD RUN OUT BEFORE YOU GOT MONEY TO BUY MORE.: SOMETIMES TRUE

## 2024-07-08 SDOH — ECONOMIC STABILITY: FOOD INSECURITY: WITHIN THE PAST 12 MONTHS, THE FOOD YOU BOUGHT JUST DIDN'T LAST AND YOU DIDN'T HAVE MONEY TO GET MORE.: SOMETIMES TRUE

## 2024-07-09 ENCOUNTER — OFFICE VISIT (OUTPATIENT)
Dept: FAMILY MEDICINE CLINIC | Age: 74
End: 2024-07-09

## 2024-07-09 ENCOUNTER — HOSPITAL ENCOUNTER (OUTPATIENT)
Age: 74
Discharge: HOME | End: 2024-07-09
Payer: MEDICARE

## 2024-07-09 VITALS
DIASTOLIC BLOOD PRESSURE: 70 MMHG | WEIGHT: 208.4 LBS | SYSTOLIC BLOOD PRESSURE: 141 MMHG | OXYGEN SATURATION: 98 % | HEART RATE: 67 BPM | BODY MASS INDEX: 33.64 KG/M2 | TEMPERATURE: 97.3 F

## 2024-07-09 DIAGNOSIS — I10 DIABETES MELLITUS WITH COINCIDENT HYPERTENSION (HCC): ICD-10-CM

## 2024-07-09 DIAGNOSIS — R53.83 OTHER FATIGUE: ICD-10-CM

## 2024-07-09 DIAGNOSIS — R76.8 ANA POSITIVE: ICD-10-CM

## 2024-07-09 DIAGNOSIS — E11.65 UNCONTROLLED TYPE 2 DIABETES MELLITUS WITH HYPERGLYCEMIA, WITH LONG-TERM CURRENT USE OF INSULIN (HCC): ICD-10-CM

## 2024-07-09 DIAGNOSIS — E11.9 DIABETES MELLITUS WITH COINCIDENT HYPERTENSION (HCC): ICD-10-CM

## 2024-07-09 DIAGNOSIS — Z79.4 UNCONTROLLED TYPE 2 DIABETES MELLITUS WITH HYPERGLYCEMIA, WITH LONG-TERM CURRENT USE OF INSULIN (HCC): ICD-10-CM

## 2024-07-09 DIAGNOSIS — R97.20 ELEVATED PSA: ICD-10-CM

## 2024-07-09 DIAGNOSIS — E11.21 DIABETIC NEPHROPATHY ASSOCIATED WITH TYPE 2 DIABETES MELLITUS (HCC): ICD-10-CM

## 2024-07-09 DIAGNOSIS — K21.9 GASTROESOPHAGEAL REFLUX DISEASE WITHOUT ESOPHAGITIS: ICD-10-CM

## 2024-07-09 DIAGNOSIS — E11.649 UNCONTROLLED TYPE 2 DIABETES MELLITUS WITH HYPOGLYCEMIA WITHOUT COMA (HCC): ICD-10-CM

## 2024-07-09 DIAGNOSIS — E11.65 UNCONTROLLED TYPE 2 DIABETES MELLITUS WITH HYPERGLYCEMIA (HCC): Primary | ICD-10-CM

## 2024-07-09 LAB
ALBUMIN SERPL-MCNC: 4.3 G/DL (ref 3.4–5)
ALP SERPL-CCNC: 117 U/L (ref 40–129)
ALT SERPL-CCNC: 18 U/L (ref 10–40)
AST SERPL-CCNC: 24 U/L (ref 15–37)
BASOPHILS # BLD: 0 K/UL (ref 0–0.2)
BASOPHILS NFR BLD: 0.7 %
BILIRUB DIRECT SERPL-MCNC: <0.2 MG/DL (ref 0–0.3)
BILIRUB INDIRECT SERPL-MCNC: NORMAL MG/DL (ref 0–1)
BILIRUB SERPL-MCNC: 0.4 MG/DL (ref 0–1)
DEPRECATED RDW RBC AUTO: 13.4 % (ref 12.4–15.4)
EOSINOPHIL # BLD: 0.3 K/UL (ref 0–0.6)
EOSINOPHIL NFR BLD: 4.3 %
HBA1C MFR BLD: 7.3 %
HCT VFR BLD AUTO: 38.8 % (ref 40.5–52.5)
HGB BLD-MCNC: 12.8 G/DL (ref 13.5–17.5)
LYMPHOCYTES # BLD: 1.5 K/UL (ref 1–5.1)
LYMPHOCYTES NFR BLD: 23.7 %
MAGNESIUM SERPL-MCNC: 1.9 MG/DL (ref 1.8–2.4)
MCH RBC QN AUTO: 28.7 PG (ref 26–34)
MCHC RBC AUTO-ENTMCNC: 33.1 G/DL (ref 31–36)
MCV RBC AUTO: 86.7 FL (ref 80–100)
MONOCYTES # BLD: 0.4 K/UL (ref 0–1.3)
MONOCYTES NFR BLD: 6.5 %
NEUTROPHILS # BLD: 4.1 K/UL (ref 1.7–7.7)
NEUTROPHILS NFR BLD: 64.8 %
PLATELET # BLD AUTO: 279 K/UL (ref 135–450)
PMV BLD AUTO: 8.9 FL (ref 5–10.5)
PROT SERPL-MCNC: 6.7 G/DL (ref 6.4–8.2)
RBC # BLD AUTO: 4.47 M/UL (ref 4.2–5.9)
TSH SERPL DL<=0.005 MIU/L-ACNC: 1.16 UIU/ML (ref 0.27–4.2)
VIT B12 SERPL-MCNC: 607 PG/ML (ref 211–911)
WBC # BLD AUTO: 6.3 K/UL (ref 4–11)

## 2024-07-09 PROCEDURE — 80076 HEPATIC FUNCTION PANEL: CPT

## 2024-07-09 PROCEDURE — 86038 ANTINUCLEAR ANTIBODIES: CPT

## 2024-07-09 PROCEDURE — 82607 VITAMIN B-12: CPT

## 2024-07-09 PROCEDURE — 85025 COMPLETE CBC W/AUTO DIFF WBC: CPT

## 2024-07-09 PROCEDURE — 86431 RHEUMATOID FACTOR QUANT: CPT

## 2024-07-09 PROCEDURE — 84154 ASSAY OF PSA FREE: CPT

## 2024-07-09 PROCEDURE — 83735 ASSAY OF MAGNESIUM: CPT

## 2024-07-09 PROCEDURE — 36415 COLL VENOUS BLD VENIPUNCTURE: CPT

## 2024-07-09 PROCEDURE — 84443 ASSAY THYROID STIM HORMONE: CPT

## 2024-07-09 PROCEDURE — 86160 COMPLEMENT ANTIGEN: CPT

## 2024-07-09 RX ORDER — LOSARTAN POTASSIUM 25 MG/1
TABLET ORAL
Qty: 360 TABLET | Refills: 1 | Status: SHIPPED | OUTPATIENT
Start: 2024-07-09

## 2024-07-09 RX ORDER — FAMOTIDINE 20 MG/1
TABLET, FILM COATED ORAL
Qty: 180 TABLET | Refills: 3 | Status: SHIPPED | OUTPATIENT
Start: 2024-07-09

## 2024-07-09 RX ORDER — AMLODIPINE BESYLATE 5 MG/1
5 TABLET ORAL DAILY
Qty: 90 TABLET | Refills: 1 | Status: SHIPPED | OUTPATIENT
Start: 2024-07-09

## 2024-07-09 ASSESSMENT — PATIENT HEALTH QUESTIONNAIRE - PHQ9
SUM OF ALL RESPONSES TO PHQ9 QUESTIONS 1 & 2: 1
1. LITTLE INTEREST OR PLEASURE IN DOING THINGS: SEVERAL DAYS
SUM OF ALL RESPONSES TO PHQ QUESTIONS 1-9: 1
SUM OF ALL RESPONSES TO PHQ QUESTIONS 1-9: 1
2. FEELING DOWN, DEPRESSED OR HOPELESS: NOT AT ALL
SUM OF ALL RESPONSES TO PHQ QUESTIONS 1-9: 1
SUM OF ALL RESPONSES TO PHQ QUESTIONS 1-9: 1

## 2024-07-11 LAB
C3 SERPL-MCNC: 136 MG/DL (ref 90–180)
C4 SERPL-MCNC: 29 MG/DL (ref 10–40)
NUCLEAR IGG SER QL IA: NORMAL
RHEUMATOID FACT SER NEPH-ACNC: <10 IU/ML (ref 0–14)

## 2024-07-12 LAB
PROSTATE SPECIFIC ANTIGEN: 39.8 NG/ML (ref 0–4)
PSA FREE MFR SERPL: 12.8 %
PSA FREE SERPL-MCNC: 5.1 UG/L

## 2024-07-13 NOTE — PROGRESS NOTES
Nithin Thapa (:  1950) is a 73 y.o. male,Established patient, here for evaluation of the following chief complaint(s):  Diabetes (DM2 follow up), Hypertension (Has been elevated recently), and Heartburn (More persistant)      Assessment & Plan   1. Uncontrolled type 2 diabetes mellitus with hyperglycemia (HCC)  -     POCT glycosylated hemoglobin (Hb A1C)  -     losartan (COZAAR) 25 MG tablet; TAKE 2 TABLETS BY MOUTH 2 TIMES A DAY, Disp-360 tablet, R-1Normal  Uncontrolled, but improving. Having hypoglycemia. Will decrease evening 70/30 insulin to 12 units nightly. Continue all other DM medications. Losartan refilled. Continue ASA 81 mg daily. Improving on Kombiglyze and Farxiga.   2. Uncontrolled type 2 diabetes mellitus with hypoglycemia without coma (HCC)  -     losartan (COZAAR) 25 MG tablet; TAKE 2 TABLETS BY MOUTH 2 TIMES A DAY, Disp-360 tablet, R-1Normal  Will decrease evening 70/30 insulin to 12 units nightly. Continue all other DM medications. Losartan refilled. Continue ASA 81 mg daily. Improving on Kombiglyze and Farxiga.   3. Diabetic nephropathy associated with type 2 diabetes mellitus (HCC)  -     losartan (COZAAR) 25 MG tablet; TAKE 2 TABLETS BY MOUTH 2 TIMES A DAY, Disp-360 tablet, R-1Normal  Stable, continue to monitor.   4. Uncontrolled type 2 diabetes mellitus with hyperglycemia, with long-term current use of insulin (HCC)  -     losartan (COZAAR) 25 MG tablet; TAKE 2 TABLETS BY MOUTH 2 TIMES A DAY, Disp-360 tablet, R-1Normal  Uncontrolled, but improving. Having hypoglycemia. Will decrease evening 70/30 insulin to 12 units nightly. Continue all other DM medications. Losartan refilled. Continue ASA 81 mg daily. Improving on Kombiglyze and Farxiga.   5. Diabetes mellitus with coincident hypertension (HCC)  -     amLODIPine (NORVASC) 5 MG tablet; Take 1 tablet by mouth daily, Disp-90 tablet, R-1Normal  -     losartan (COZAAR) 25 MG tablet; TAKE 2 TABLETS BY MOUTH 2 TIMES A DAY, Disp-360

## 2024-07-15 DIAGNOSIS — J20.9 ACUTE BRONCHITIS WITH BRONCHOSPASM: ICD-10-CM

## 2024-07-15 DIAGNOSIS — K21.9 GASTROESOPHAGEAL REFLUX DISEASE WITHOUT ESOPHAGITIS: ICD-10-CM

## 2024-07-15 RX ORDER — ALBUTEROL SULFATE 2.5 MG/3ML
2.5 SOLUTION RESPIRATORY (INHALATION) EVERY 4 HOURS PRN
Qty: 120 EACH | Refills: 5 | Status: CANCELLED | OUTPATIENT
Start: 2024-07-15

## 2024-07-15 RX ORDER — TADALAFIL 5 MG/1
5 TABLET ORAL DAILY
Qty: 90 TABLET | Refills: 3 | Status: CANCELLED | OUTPATIENT
Start: 2024-07-15

## 2024-07-15 RX ORDER — FAMOTIDINE 20 MG/1
TABLET, FILM COATED ORAL
Qty: 180 TABLET | Refills: 3 | Status: CANCELLED | OUTPATIENT
Start: 2024-07-15

## 2024-07-17 DIAGNOSIS — N18.31 CKD STAGE 3A, GFR 45-59 ML/MIN (HCC): ICD-10-CM

## 2024-07-17 DIAGNOSIS — R97.20 ELEVATED PSA: Primary | ICD-10-CM

## 2024-07-17 DIAGNOSIS — D64.9 CHRONIC ANEMIA: ICD-10-CM

## 2024-07-17 DIAGNOSIS — R80.1 PERSISTENT PROTEINURIA: ICD-10-CM

## 2024-07-29 DIAGNOSIS — N18.31 CHRONIC RENAL FAILURE, STAGE 3A (HCC): Primary | ICD-10-CM

## 2024-07-30 ENCOUNTER — HOSPITAL ENCOUNTER (OUTPATIENT)
Dept: CT IMAGING | Age: 74
Discharge: HOME | End: 2024-07-30
Payer: MEDICARE

## 2024-07-30 DIAGNOSIS — N18.31 CKD STAGE 3A, GFR 45-59 ML/MIN (HCC): ICD-10-CM

## 2024-07-30 DIAGNOSIS — Z79.4 CONTROLLED TYPE 2 DIABETES MELLITUS WITH STAGE 3 CHRONIC KIDNEY DISEASE, WITH LONG-TERM CURRENT USE OF INSULIN (HCC): Primary | ICD-10-CM

## 2024-07-30 DIAGNOSIS — D64.9 CHRONIC ANEMIA: ICD-10-CM

## 2024-07-30 DIAGNOSIS — E11.22 CONTROLLED TYPE 2 DIABETES MELLITUS WITH STAGE 3 CHRONIC KIDNEY DISEASE, WITH LONG-TERM CURRENT USE OF INSULIN (HCC): Primary | ICD-10-CM

## 2024-07-30 DIAGNOSIS — N18.30 CONTROLLED TYPE 2 DIABETES MELLITUS WITH STAGE 3 CHRONIC KIDNEY DISEASE, WITH LONG-TERM CURRENT USE OF INSULIN (HCC): Primary | ICD-10-CM

## 2024-07-30 DIAGNOSIS — R97.20 ELEVATED PSA: ICD-10-CM

## 2024-07-30 DIAGNOSIS — N18.31 CHRONIC RENAL FAILURE, STAGE 3A (HCC): ICD-10-CM

## 2024-07-30 DIAGNOSIS — R80.1 PERSISTENT PROTEINURIA: ICD-10-CM

## 2024-07-30 LAB
PERFORMED ON: ABNORMAL
POC CREATININE: 1.4 MG/DL (ref 0.8–1.3)
POC SAMPLE TYPE: ABNORMAL

## 2024-07-30 PROCEDURE — 6360000004 HC RX CONTRAST MEDICATION: Performed by: FAMILY MEDICINE

## 2024-07-30 PROCEDURE — 82565 ASSAY OF CREATININE: CPT

## 2024-08-02 ENCOUNTER — TELEPHONE (OUTPATIENT)
Dept: FAMILY MEDICINE CLINIC | Age: 74
End: 2024-08-02

## 2024-08-02 NOTE — TELEPHONE ENCOUNTER
Pt returned call to office. Pt states that he is claustrophobic of tight spaces and this is why he did not have CT done.

## 2024-08-02 NOTE — TELEPHONE ENCOUNTER
Jie Khalil LPN   Licensed Nurse     Telephone Encounter     Signed     Encounter Date: 7/29/2024     Signed         Left message for patient to call the office.     Please ask patient Kapil's question:     Did the CT scan make him claustrophobic or could you ask   him why did he not have it done?                Electronically signed by Jie Khalil LPN at 8/2/2024 11:36 AM         Patient Message on 7/29/2024            Detailed Report          Note shared with patient  Additional Documentation    Encounter Info: Billing Info,     History,     Allergies,     Detailed Report     Telephone Encounter Info    Author Note Status Last Update User Last Update Date/Time   Jie Khalil LPN Signed Jie Khalil LPN 8/2/2024 11:36 AM     Chart Review Routing History    No routing history on file.  Orders Placed    None  Medication Renewals and Changes      None  Medication List  Visit Diagnoses      None  Problem List

## 2024-08-05 NOTE — TELEPHONE ENCOUNTER
If the patient is willing to try that, that would be fine. Otherwise, sedation for MRI is available at [facility] if he would rather do that.

## 2024-08-12 NOTE — TELEPHONE ENCOUNTER
Spoke with patient and his wife in regards to this and he stated he is not interested in either options at this time but will let you know if he changes his mind.

## 2024-08-16 ENCOUNTER — TELEPHONE (OUTPATIENT)
Dept: FAMILY MEDICINE CLINIC | Age: 74
End: 2024-08-16

## 2024-08-16 NOTE — TELEPHONE ENCOUNTER
[facility] - [facility]    Date: 8/16/2024  Patient's Name: Nithin Thapa YOB: 1950            _____________________________________________________________________________________________    Sent Mychart message to schedule PharmD follow up DM landon appointment.      Padmini Lowry, PharmD  Ambulatory Clinical Pharmacist   [facility] Office - [facility]  Phone: [phone]   Fax: [phone]

## 2024-08-20 ENCOUNTER — PATIENT MESSAGE (OUTPATIENT)
Dept: FAMILY MEDICINE CLINIC | Age: 74
End: 2024-08-20

## 2024-08-21 NOTE — TELEPHONE ENCOUNTER
[facility] - [facility]    Date: 8/21/2024  Patient's Name: Nithin Thapa YOB: 1950            _____________________________________________________________________________________________    Patient responded to [employer]  to schedule PharmD follow up DM landon appointment. Patient scheduled for 9/12/24 at 2:00 PM.       Padmini Lowry, PharmD  Ambulatory Clinical Pharmacist   [facility] Office - [facility]  Phone: [phone]   Fax: [phone]     For Pharmacy Admin Tracking Only    Program: Medical Group  CPA in place:  Yes  Recommendation Provided To: Patient/Caregiver: 1 via [employer] Message  Intervention Detail: Scheduled Appointment  Intervention Accepted By: Patient/Caregiver: 1   Time Spent (min): 15

## 2024-09-09 ENCOUNTER — OFFICE VISIT (OUTPATIENT)
Dept: ENT CLINIC | Age: 74
End: 2024-09-09
Payer: MEDICARE

## 2024-09-09 VITALS
DIASTOLIC BLOOD PRESSURE: 65 MMHG | HEIGHT: 66 IN | HEART RATE: 69 BPM | BODY MASS INDEX: 33.75 KG/M2 | WEIGHT: 210 LBS | SYSTOLIC BLOOD PRESSURE: 154 MMHG

## 2024-09-09 DIAGNOSIS — H61.23 BILATERAL IMPACTED CERUMEN: Primary | ICD-10-CM

## 2024-09-09 DIAGNOSIS — H90.3 SENSORINEURAL HEARING LOSS (SNHL) OF BOTH EARS: ICD-10-CM

## 2024-09-09 PROCEDURE — 3077F SYST BP >= 140 MM HG: CPT | Performed by: STUDENT IN AN ORGANIZED HEALTH CARE EDUCATION/TRAINING PROGRAM

## 2024-09-09 PROCEDURE — G8427 DOCREV CUR MEDS BY ELIG CLIN: HCPCS | Performed by: STUDENT IN AN ORGANIZED HEALTH CARE EDUCATION/TRAINING PROGRAM

## 2024-09-09 PROCEDURE — 69210 REMOVE IMPACTED EAR WAX UNI: CPT | Performed by: STUDENT IN AN ORGANIZED HEALTH CARE EDUCATION/TRAINING PROGRAM

## 2024-09-09 PROCEDURE — 1036F TOBACCO NON-USER: CPT | Performed by: STUDENT IN AN ORGANIZED HEALTH CARE EDUCATION/TRAINING PROGRAM

## 2024-09-09 PROCEDURE — 1123F ACP DISCUSS/DSCN MKR DOCD: CPT | Performed by: STUDENT IN AN ORGANIZED HEALTH CARE EDUCATION/TRAINING PROGRAM

## 2024-09-09 PROCEDURE — 3078F DIAST BP <80 MM HG: CPT | Performed by: STUDENT IN AN ORGANIZED HEALTH CARE EDUCATION/TRAINING PROGRAM

## 2024-09-09 PROCEDURE — 3017F COLORECTAL CA SCREEN DOC REV: CPT | Performed by: STUDENT IN AN ORGANIZED HEALTH CARE EDUCATION/TRAINING PROGRAM

## 2024-09-09 PROCEDURE — 99213 OFFICE O/P EST LOW 20 MIN: CPT | Performed by: STUDENT IN AN ORGANIZED HEALTH CARE EDUCATION/TRAINING PROGRAM

## 2024-09-09 PROCEDURE — G8417 CALC BMI ABV UP PARAM F/U: HCPCS | Performed by: STUDENT IN AN ORGANIZED HEALTH CARE EDUCATION/TRAINING PROGRAM

## 2024-09-12 ENCOUNTER — PHARMACY VISIT (OUTPATIENT)
Dept: FAMILY MEDICINE CLINIC | Age: 74
End: 2024-09-12

## 2024-09-12 DIAGNOSIS — E11.69 DM TYPE 2 WITH DIABETIC DYSLIPIDEMIA (HCC): Primary | ICD-10-CM

## 2024-09-12 DIAGNOSIS — E78.5 DM TYPE 2 WITH DIABETIC DYSLIPIDEMIA (HCC): Primary | ICD-10-CM

## 2024-09-27 ENCOUNTER — TELEMEDICINE (OUTPATIENT)
Age: 74
End: 2024-09-27

## 2024-09-27 ENCOUNTER — TELEPHONE (OUTPATIENT)
Age: 74
End: 2024-09-27

## 2024-09-27 DIAGNOSIS — T14.8XXA WOUND OF SKIN: ICD-10-CM

## 2024-09-27 DIAGNOSIS — L03.113 CELLULITIS OF RIGHT UPPER EXTREMITY: Primary | ICD-10-CM

## 2024-09-27 RX ORDER — CEPHALEXIN 500 MG/1
500 CAPSULE ORAL 4 TIMES DAILY
Qty: 20 CAPSULE | Refills: 0 | Status: SHIPPED | OUTPATIENT
Start: 2024-09-27 | End: 2024-10-02

## 2024-09-27 RX ORDER — MUPIROCIN 20 MG/G
OINTMENT TOPICAL
Qty: 15 G | Refills: 0 | Status: SHIPPED | OUTPATIENT
Start: 2024-09-27 | End: 2024-10-04

## 2024-09-27 ASSESSMENT — ENCOUNTER SYMPTOMS
RESPIRATORY NEGATIVE: 1
GASTROINTESTINAL NEGATIVE: 1
VOMITING: 0
NAUSEA: 0
DIARRHEA: 0

## 2024-10-10 ENCOUNTER — OFFICE VISIT (OUTPATIENT)
Dept: FAMILY MEDICINE CLINIC | Age: 74
End: 2024-10-10

## 2024-10-10 VITALS
OXYGEN SATURATION: 97 % | HEIGHT: 66 IN | HEART RATE: 70 BPM | DIASTOLIC BLOOD PRESSURE: 70 MMHG | RESPIRATION RATE: 16 BRPM | BODY MASS INDEX: 33.75 KG/M2 | SYSTOLIC BLOOD PRESSURE: 120 MMHG | WEIGHT: 210 LBS

## 2024-10-10 VITALS
SYSTOLIC BLOOD PRESSURE: 120 MMHG | TEMPERATURE: 97 F | DIASTOLIC BLOOD PRESSURE: 70 MMHG | OXYGEN SATURATION: 97 % | HEART RATE: 70 BPM | HEIGHT: 66 IN | WEIGHT: 210.6 LBS | BODY MASS INDEX: 33.85 KG/M2

## 2024-10-10 DIAGNOSIS — R97.20 ELEVATED PSA: ICD-10-CM

## 2024-10-10 DIAGNOSIS — Z00.00 MEDICARE ANNUAL WELLNESS VISIT, SUBSEQUENT: Primary | ICD-10-CM

## 2024-10-10 DIAGNOSIS — E11.65 UNCONTROLLED TYPE 2 DIABETES MELLITUS WITH HYPERGLYCEMIA (HCC): Primary | ICD-10-CM

## 2024-10-10 DIAGNOSIS — K42.9 UMBILICAL HERNIA WITHOUT OBSTRUCTION AND WITHOUT GANGRENE: ICD-10-CM

## 2024-10-10 LAB
CREATININE URINE POCT: 100
HBA1C MFR BLD: 7.7 %
MICROALBUMIN/CREAT 24H UR: 150 MG/DL
MICROALBUMIN/CREAT UR-RTO: >300 MG/G

## 2024-10-10 RX ORDER — MAGNESIUM GLUCONATE 27 MG(500)
500 TABLET ORAL 2 TIMES DAILY
COMMUNITY

## 2024-10-10 SDOH — ECONOMIC STABILITY: FOOD INSECURITY: WITHIN THE PAST 12 MONTHS, THE FOOD YOU BOUGHT JUST DIDN'T LAST AND YOU DIDN'T HAVE MONEY TO GET MORE.: NEVER TRUE

## 2024-10-10 SDOH — ECONOMIC STABILITY: FOOD INSECURITY: WITHIN THE PAST 12 MONTHS, YOU WORRIED THAT YOUR FOOD WOULD RUN OUT BEFORE YOU GOT MONEY TO BUY MORE.: NEVER TRUE

## 2024-10-10 SDOH — ECONOMIC STABILITY: INCOME INSECURITY: HOW HARD IS IT FOR YOU TO PAY FOR THE VERY BASICS LIKE FOOD, HOUSING, MEDICAL CARE, AND HEATING?: NOT HARD AT ALL

## 2024-10-10 ASSESSMENT — LIFESTYLE VARIABLES
HOW OFTEN DO YOU HAVE A DRINK CONTAINING ALCOHOL: NEVER
HOW MANY STANDARD DRINKS CONTAINING ALCOHOL DO YOU HAVE ON A TYPICAL DAY: PATIENT DOES NOT DRINK

## 2024-10-10 ASSESSMENT — ANXIETY QUESTIONNAIRES
3. WORRYING TOO MUCH ABOUT DIFFERENT THINGS: NOT AT ALL
2. NOT BEING ABLE TO STOP OR CONTROL WORRYING: NOT AT ALL
6. BECOMING EASILY ANNOYED OR IRRITABLE: NOT AT ALL
5. BEING SO RESTLESS THAT IT IS HARD TO SIT STILL: NOT AT ALL
IF YOU CHECKED OFF ANY PROBLEMS ON THIS QUESTIONNAIRE, HOW DIFFICULT HAVE THESE PROBLEMS MADE IT FOR YOU TO DO YOUR WORK, TAKE CARE OF THINGS AT HOME, OR GET ALONG WITH OTHER PEOPLE: NOT DIFFICULT AT ALL
4. TROUBLE RELAXING: NOT AT ALL
1. FEELING NERVOUS, ANXIOUS, OR ON EDGE: NOT AT ALL
7. FEELING AFRAID AS IF SOMETHING AWFUL MIGHT HAPPEN: NOT AT ALL
GAD7 TOTAL SCORE: 0

## 2024-10-10 ASSESSMENT — PATIENT HEALTH QUESTIONNAIRE - PHQ9
6. FEELING BAD ABOUT YOURSELF - OR THAT YOU ARE A FAILURE OR HAVE LET YOURSELF OR YOUR FAMILY DOWN: NOT AT ALL
SUM OF ALL RESPONSES TO PHQ9 QUESTIONS 1 & 2: 0
4. FEELING TIRED OR HAVING LITTLE ENERGY: NOT AT ALL
5. POOR APPETITE OR OVEREATING: NOT AT ALL
10. IF YOU CHECKED OFF ANY PROBLEMS, HOW DIFFICULT HAVE THESE PROBLEMS MADE IT FOR YOU TO DO YOUR WORK, TAKE CARE OF THINGS AT HOME, OR GET ALONG WITH OTHER PEOPLE: NOT DIFFICULT AT ALL
1. LITTLE INTEREST OR PLEASURE IN DOING THINGS: NOT AT ALL
3. TROUBLE FALLING OR STAYING ASLEEP: NOT AT ALL
SUM OF ALL RESPONSES TO PHQ QUESTIONS 1-9: 0
SUM OF ALL RESPONSES TO PHQ QUESTIONS 1-9: 0
9. THOUGHTS THAT YOU WOULD BE BETTER OFF DEAD, OR OF HURTING YOURSELF: NOT AT ALL
SUM OF ALL RESPONSES TO PHQ QUESTIONS 1-9: 0
SUM OF ALL RESPONSES TO PHQ QUESTIONS 1-9: 0
7. TROUBLE CONCENTRATING ON THINGS, SUCH AS READING THE NEWSPAPER OR WATCHING TELEVISION: NOT AT ALL
8. MOVING OR SPEAKING SO SLOWLY THAT OTHER PEOPLE COULD HAVE NOTICED. OR THE OPPOSITE, BEING SO FIGETY OR RESTLESS THAT YOU HAVE BEEN MOVING AROUND A LOT MORE THAN USUAL: NOT AT ALL
2. FEELING DOWN, DEPRESSED OR HOPELESS: NOT AT ALL

## 2024-10-10 NOTE — PROGRESS NOTES
(KENALOG) 0.1 % cream Apply topically 2 times daily.  Lizzy Nuñez MD   Handicap Placard MISC by Does not apply route  Lizzy Nuñez MD   fluticasone (FLONASE) 50 MCG/ACT nasal spray Use 1 spray(s) in each nostril once daily  Lizzy Nuñez MD   Alcohol Swabs (ALCOHOL PADS) 70 % PADS 1 each by Does not apply route 2 times daily  Lizzy Nuñez MD   magnesium oxide (MAG-OX) 400 (241.3 Mg) MG TABS tablet Take 1 tablet by mouth daily  Patient not taking: Reported on 10/10/2024  Lizzy Nuñez MD   ciclopirox (PENLAC) 8 % solution Apply topically nightly.  Lizzy Nuñez MD   Multiple Vitamins-Minerals (THERAPEUTIC MULTIVITAMIN-MINERALS) tablet Take 1 tablet by mouth daily  ProviderJavier MD   albuterol sulfate (PROAIR RESPICLICK) 108 (90 BASE) MCG/ACT aerosol powder inhalation Inhale 1-2 puffs into the lungs every 4 hours as needed for Wheezing or Shortness of Breath  Lizzy Nuñez MD   aspirin 81 MG chewable tablet Take 1 tablet by mouth daily OTC  ProviderJavier MD   fish oil-omega-3 fatty acids 1000 MG capsule Take 2 capsules by mouth daily OTC  Provider, MD Javier       CareTeam (Including outside providers/suppliers regularly involved in providing care):   Patient Care Team:  Lizzy Nuñez MD as PCP - General  Lizzy Nuñez MD as PCP - Empaneled Provider  Nat Cheung, RN as Registered Nurse (Clinical Nurse Specialist [facility])  Padmini Lowry [facility] as Pharmacist (Pharmacist)      Reviewed and updated this visit:  Tobacco  Allergies  Meds  Problems  Med Hx  Surg Hx  Soc Hx  Fam Hx        I, Melida Webb LPN, 10/10/2024, performed the documented evaluation under the direct supervision of the attending physician.     This encounter was performed under Lizzy samuel MD’s, direct supervision, 10/10/2024.

## 2024-10-11 PROBLEM — K42.9 UMBILICAL HERNIA WITHOUT OBSTRUCTION AND WITHOUT GANGRENE: Status: ACTIVE | Noted: 2024-10-11

## 2024-10-11 LAB
PROSTATE SPECIFIC ANTIGEN: 50.8 NG/ML (ref 0–4)
PSA FREE MFR SERPL: 12 %
PSA FREE SERPL-MCNC: 6.1 UG/L

## 2024-10-11 NOTE — ASSESSMENT & PLAN NOTE
Chronic, worsening (exacerbation), patient refusing imaging due to claustrophobia. Offered MRI with sedation. If PSA continues to rise, will be referring urology. Strongly suspect prostate cancer development.     Orders:    PSA, TOTAL AND FREE; Future

## 2024-10-11 NOTE — PROGRESS NOTES
Nithin Thapa (:  1950) is a 73 y.o. male,Established patient, here for evaluation of the following chief complaint(s):  Diabetes         Assessment & Plan  Uncontrolled type 2 diabetes mellitus with hyperglycemia (HCC)   Chronic, not at goal (unstable), changes made today: discussed Ozempic patient assistance and will ensure they have all necessary forms to get him onto to Ozempic. Will titrate dose up as tolerated. Discussed he will need to stop Jentadueto once he has Ozempic. I would continue with insulin for now, but may need to decrease dosage based upon response with Ozempic. Recheck A1C in 3 months. Continue to follow up with DM education and pharmacy support team in between visits here.     Orders:    POCT glycosylated hemoglobin (Hb A1C)    POCT microalbumin    Elevated PSA   Chronic, worsening (exacerbation), patient refusing imaging due to claustrophobia. Offered MRI with sedation. If PSA continues to rise, will be referring urology. Strongly suspect prostate cancer development.     Orders:    PSA, TOTAL AND FREE; Future    Umbilical hernia without obstruction and without gangrene    Chronic, asymptomatic. He declines any intervention at this time. Encouraged to consider having this repaired.            Return in about 3 months (around 1/10/2025) for Diabetes.       Subjective   Chief Complaint   Patient presents with    Diabetes       Diabetes  He presents for his follow-up diabetic visit. He has type 2 diabetes mellitus. His disease course has been worsening. There are no hypoglycemic complications. Symptoms are stable. Diabetic complications include nephropathy and retinopathy. Current diabetic treatment includes oral agent (triple therapy) and insulin injections (Currently on Farxiga and Jentadueto. He was supposed to start Ozempic, but never heard back about patient assistance). He is compliant with treatment most of the time. His home blood glucose trend is increasing steadily. An ACE

## 2024-10-11 NOTE — ASSESSMENT & PLAN NOTE
Chronic, asymptomatic. He declines any intervention at this time. Encouraged to consider having this repaired.

## 2024-10-11 NOTE — ASSESSMENT & PLAN NOTE
Chronic, not at goal (unstable), changes made today: discussed Ozempic patient assistance and will ensure they have all necessary forms to get him onto to Ozempic. Will titrate dose up as tolerated. Discussed he will need to stop Jentadueto once he has Ozempic. I would continue with insulin for now, but may need to decrease dosage based upon response with Ozempic. Recheck A1C in 3 months. Continue to follow up with DM education and pharmacy support team in between visits here.     Orders:    POCT glycosylated hemoglobin (Hb A1C)    POCT microalbumin

## 2024-10-14 ENCOUNTER — PATIENT MESSAGE (OUTPATIENT)
Dept: FAMILY MEDICINE CLINIC | Age: 74
End: 2024-10-14

## 2024-10-14 DIAGNOSIS — R97.20 ELEVATED PSA: Primary | ICD-10-CM

## 2024-10-14 NOTE — TELEPHONE ENCOUNTER
Referral pended for Urology.    See spouses chart for documentation regarding spouse.    Please advise.

## 2024-10-15 ENCOUNTER — TELEPHONE (OUTPATIENT)
Dept: FAMILY MEDICINE CLINIC | Age: 74
End: 2024-10-15

## 2024-10-15 NOTE — TELEPHONE ENCOUNTER
Called [employer] to follow up on Ozempic request faxed out on Friday.    They are going to process application and call me back once they have it completed

## 2024-10-16 NOTE — TELEPHONE ENCOUNTER
Received call back from [employer], patient approved for Ozempic through PAP until 9/2025. Medication will be here in 10-14 business days.     Called patient, spoke with spouse, Pat, [place] per HIPAA and informed her of approval. We will contact patient once medication has arrived for

## 2024-10-25 ENCOUNTER — PATIENT MESSAGE (OUTPATIENT)
Dept: FAMILY MEDICINE CLINIC | Age: 74
End: 2024-10-25

## 2024-10-25 ENCOUNTER — HOSPITAL ENCOUNTER (OUTPATIENT)
Age: 74
Discharge: HOME | End: 2024-10-25
Payer: MEDICARE

## 2024-10-25 LAB
BACTERIA URNS QL MICRO: ABNORMAL /HPF
BILIRUB UR QL STRIP.AUTO: NEGATIVE
CLARITY UR: CLEAR
COLOR UR: YELLOW
GLUCOSE UR STRIP.AUTO-MCNC: >=1000 MG/DL
HGB UR QL STRIP.AUTO: NEGATIVE
KETONES UR STRIP.AUTO-MCNC: NEGATIVE MG/DL
LEUKOCYTE ESTERASE UR QL STRIP.AUTO: NEGATIVE
MUCOUS THREADS #/AREA URNS LPF: ABNORMAL /LPF
NITRITE UR QL STRIP.AUTO: NEGATIVE
PH UR STRIP.AUTO: 6.5 [PH] (ref 5–8)
PROT UR STRIP.AUTO-MCNC: 100 MG/DL
RBC #/AREA URNS HPF: ABNORMAL /HPF (ref 0–4)
SP GR UR STRIP.AUTO: 1.01 (ref 1–1.03)
UA DIPSTICK W REFLEX MICRO PNL UR: YES
URN SPEC COLLECT METH UR: ABNORMAL
UROBILINOGEN UR STRIP-ACNC: 0.2 E.U./DL
WBC #/AREA URNS HPF: ABNORMAL /HPF (ref 0–5)

## 2024-10-25 PROCEDURE — 82570 ASSAY OF URINE CREATININE: CPT

## 2024-10-25 PROCEDURE — 82043 UR ALBUMIN QUANTITATIVE: CPT

## 2024-10-25 PROCEDURE — 81001 URINALYSIS AUTO W/SCOPE: CPT

## 2024-10-25 PROCEDURE — 83970 ASSAY OF PARATHORMONE: CPT

## 2024-10-25 PROCEDURE — 84156 ASSAY OF PROTEIN URINE: CPT

## 2024-10-25 PROCEDURE — 82306 VITAMIN D 25 HYDROXY: CPT

## 2024-10-25 PROCEDURE — 80069 RENAL FUNCTION PANEL: CPT

## 2024-10-25 PROCEDURE — 36415 COLL VENOUS BLD VENIPUNCTURE: CPT

## 2024-10-25 PROCEDURE — 84550 ASSAY OF BLOOD/URIC ACID: CPT

## 2024-10-26 LAB
25(OH)D3 SERPL-MCNC: 24.1 NG/ML
ALBUMIN SERPL-MCNC: 4.2 G/DL (ref 3.4–5)
ANION GAP SERPL CALCULATED.3IONS-SCNC: 10 MMOL/L (ref 3–16)
BUN SERPL-MCNC: 17 MG/DL (ref 7–20)
CALCIUM SERPL-MCNC: 9.2 MG/DL (ref 8.3–10.6)
CHLORIDE SERPL-SCNC: 104 MMOL/L (ref 99–110)
CO2 SERPL-SCNC: 26 MMOL/L (ref 21–32)
CREAT SERPL-MCNC: 1.3 MG/DL (ref 0.8–1.3)
CREAT UR-MCNC: 56.2 MG/DL (ref 39–259)
GFR SERPLBLD CREATININE-BSD FMLA CKD-EPI: 57 ML/MIN/{1.73_M2}
GLUCOSE SERPL-MCNC: 222 MG/DL (ref 70–99)
MICROALBUMIN UR DL<=1MG/L-MCNC: 35.8 MG/DL
MICROALBUMIN/CREAT UR: 637 MG/G (ref 0–30)
PHOSPHATE SERPL-MCNC: 3.3 MG/DL (ref 2.5–4.9)
POTASSIUM SERPL-SCNC: 4 MMOL/L (ref 3.5–5.1)
PROT UR-MCNC: 63.5 MG/DL
PROT/CREAT UR-RTO: 1.1 MG/DL
PTH-INTACT SERPL-MCNC: 52.1 PG/ML (ref 14–72)
SODIUM SERPL-SCNC: 140 MMOL/L (ref 136–145)
URATE SERPL-MCNC: 6 MG/DL (ref 3.5–7.2)

## 2024-11-12 ENCOUNTER — TELEPHONE (OUTPATIENT)
Dept: FAMILY MEDICINE CLINIC | Age: 74
End: 2024-11-12

## 2024-11-20 NOTE — TELEPHONE ENCOUNTER
[facility] - [facility]    Date: 11/20/2024  Patient's Name: Nithin Thapa YOB: 1950            _____________________________________________________________________________________________    Patient PAP for Ozempic was approved and he picked up in office on 10/29/24. Medication was reviewed with him by Kate RN, and he was instructed to stop the Jentadueto once he started Ozempic. Sent message to patient via [employer] to see how he was doing with the Ozempic.     Reviewed CGM data since starting on Ozempic:   11/7/24 - 11/20/24 10/24/24 - 11/6/24   Time in range ( mg/dL) 61%  Goal: >70% 47%  Goal: >70%   Time above target range High: 35%  Very high: 3% High: 33%  Very high: 18%   Time below target range Low: 1%  Very low: 0% Low: 1%  Very low: 0%   Average glucose 163 mg/dL  Goal: <154 mg/dL 191 mg/dL  Goal: <154 mg/dL   Glucose management index (GMI) 7.2%  Goal: <7.0% 7.9%  Goal: <7.0%   Glucose variability 30.7%  Goal: <36% 34.8%  Goal: <36%     Padmini Lowry, PharmD  Ambulatory Clinical Pharmacist   [facility] Office - [facility]  Phone: [phone]   Fax: [phone]

## 2024-11-21 ENCOUNTER — PHARMACY VISIT (OUTPATIENT)
Dept: FAMILY MEDICINE CLINIC | Age: 74
End: 2024-11-21

## 2024-11-21 DIAGNOSIS — E11.65 UNCONTROLLED TYPE 2 DIABETES MELLITUS WITH HYPERGLYCEMIA (HCC): Primary | ICD-10-CM

## 2024-11-21 NOTE — PROGRESS NOTES
Clinical Pharmacy Note    Nithin Thapa is a 74 y.o. male who has been referred by Lizzy Nuñez MD to the clinical pharmacist through the diabetes mariel for medication management. Nithin Thapa was seen today for a follow-up diabetes management visit. Patient acknowledges working in consult agreement with clinical pharmacist and this provider.    REVIEW OF DIABETES & LIFESTYLE  Nithin Thapa is here for an evaluation of their type II diabetes mellitus. The patient was diagnosed in June 2011. Reviewed the signs and symptoms of hypoglycemia with patient and how to treat hypoglycemic episodes at initial pharmD visit. The patient was last seen by the pharmacist on 9/12/24. At last visit patient was to start Ozempic 0.25mg weekly and stop Jentadueto once he started.     Interval history since last pharmD visit:  Last visit with, Dr. Nuñez, PCP:   10/10/24 - discussed Ozempic PAP and stopping Jentadueto  The patient has not had any ED visits or hospitalizations due to diabetes since last pharmacist visit.     The patient does monitor their blood sugar at home using [employer]. Reviewed 2% lows with patient and recommended to ensure he takes his evening dose of insulin with a meal or if not eating to reduce # of units taken to avoid overnight and AM lows.      11/8/24 - 11/21/24 8/30/24 - 9/12/24   Time in range ( mg/dL) 62%  Goal: >70% 45%  Goal: >70%   Time above target range High: 33%  Very high: 3% High: 33%  Very high: 21%   Time below target range Low: 2%  Very low: 0% Low: 1%  Very low: 0%   Average glucose 160 mg/dL  Goal: <154 mg/dL 192 mg/dL  Goal: <154 mg/dL   Glucose management index (GMI) 7.1%  Goal: <7.0% 7.9%  Goal: <7.0%   Glucose variability 32.1%  Goal: <36% 36.1%  Goal: <36%     Last A1c was 7.7% on 10/10/24 - patient goal: <7%  A1c trend over the last 12 months: 6.8 > 9.0 > 8.4 > 7.3 > 7.7    Diabetes Medications:  Patient has previously tried the following antidiabetic

## 2024-11-21 NOTE — TELEPHONE ENCOUNTER
[facility] - [facility]    Date: 11/21/2024  Patient's Name: Nithin Thapa YOB: 1950            _____________________________________________________________________________________________    MyChart message sent to follow-up with patient about Ozempic. Reported constipation and use of magnesium citrate. Also reported joint and muscle discomfort in low back and hip. Patient scheduled for appointment this afternoon to further discuss.     Padmini Lowry, PharmD  Ambulatory Clinical Pharmacist   [facility] Office - [facility]  Phone: [phone]   Fax: [phone]     For Pharmacy Admin Tracking Only    Program: Medical Group  CPA in place:  Yes  Recommendation Provided To: Patient/Caregiver: 1 via [employer] Message  Intervention Detail: Scheduled Appointment  Intervention Accepted By: Patient/Caregiver: 1   Time Spent (min): 10

## 2024-12-03 DIAGNOSIS — E11.649 UNCONTROLLED TYPE 2 DIABETES MELLITUS WITH HYPOGLYCEMIA WITHOUT COMA (HCC): ICD-10-CM

## 2024-12-04 DIAGNOSIS — K21.9 GASTROESOPHAGEAL REFLUX DISEASE WITHOUT ESOPHAGITIS: ICD-10-CM

## 2024-12-04 NOTE — TELEPHONE ENCOUNTER
Refill Request     CONFIRM preferred pharmacy with the patient.    If Mail Order Rx - Pend for 90 day refill.      Last Seen: Last Seen Department: 11/21/2024  Last Seen by PCP: 10/10/2024    Last Written: 1/17/2024    If no future appointment scheduled:  Review the last OV with PCP and review information for follow-up visit,  Route STAFF MESSAGE with patient name to the  Pool for scheduling with the following information:            -  Timing of next visit           -  Visit type ie Physical, OV, etc           -  Diagnoses/Reason ie. COPD, HTN - Do not use MEDICATION, Follow-up or CHECK UP - Give reason for visit      Next Appointment:   Future Appointments   Date Time Provider Department Center   12/5/2024  2:45 PM EFM - [facility]AR[facility]DIABETES [facility] HANDY [facility] DEP   1/10/2025 11:00 AM Lizzy Nuñez MD [facility] DEP   3/4/2025 10:00 AM Corey Szymanski DO [facility]       Message sent to  to schedule appt with patient?  NO      Requested Prescriptions     Pending Prescriptions Disp Refills    insulin 70-30 (NOVOLIN 70/30 RELION) (70-30) 100 UNIT per ML injection vial [Pharmacy Med Name: NovoLIN 70/30 ReliOn (70-30) 100 UNIT/ML Subcutaneous Suspension] 20 mL 3     Sig: INJECT 25 UNITS SUBCUTANEOUSLY EVERY MORNING AND 10 TO 15 UNITS SUNCUTANEOUSLY ONCE NIGHTLY

## 2024-12-04 NOTE — TELEPHONE ENCOUNTER
Refill Request     CONFIRM preferred pharmacy with the patient.    If Mail Order Rx - Pend for 90 day refill.      Last Seen: Last Seen Department: 11/21/2024  Last Seen by PCP: 10/10/2024    Last Written: 07/09/24 180 with 3 refills     If no future appointment scheduled:  Review the last OV with PCP and review information for follow-up visit,  Route STAFF MESSAGE with patient name to the  Pool for scheduling with the following information:            -  Timing of next visit           -  Visit type ie Physical, OV, etc           -  Diagnoses/Reason ie. COPD, HTN - Do not use MEDICATION, Follow-up or CHECK UP - Give reason for visit      Next Appointment:   Future Appointments   Date Time Provider Department Center   12/5/2024  2:45 PM [facility]   1/10/2025 11:00 AM Lizzy Nuñez MD [facility] DEP   3/4/2025 10:00 AM Corey Szymanski DO [facility]       Message sent to  to schedule appt with patient?  NO      Requested Prescriptions     Pending Prescriptions Disp Refills    famotidine (PEPCID) 20 MG tablet 180 tablet 3     Sig: Take 1 tablet by mouth twice daily

## 2024-12-05 ENCOUNTER — PHARMACY VISIT (OUTPATIENT)
Dept: FAMILY MEDICINE CLINIC | Age: 74
End: 2024-12-05

## 2024-12-05 ENCOUNTER — TELEPHONE (OUTPATIENT)
Dept: FAMILY MEDICINE CLINIC | Age: 74
End: 2024-12-05

## 2024-12-05 DIAGNOSIS — E11.69 TYPE 2 DIABETES MELLITUS WITH OBESITY (HCC): ICD-10-CM

## 2024-12-05 DIAGNOSIS — E11.69 DM TYPE 2 WITH DIABETIC DYSLIPIDEMIA (HCC): Primary | ICD-10-CM

## 2024-12-05 DIAGNOSIS — E66.9 TYPE 2 DIABETES MELLITUS WITH OBESITY (HCC): ICD-10-CM

## 2024-12-05 DIAGNOSIS — E11.22 CONTROLLED TYPE 2 DIABETES MELLITUS WITH STAGE 3 CHRONIC KIDNEY DISEASE, WITH LONG-TERM CURRENT USE OF INSULIN (HCC): ICD-10-CM

## 2024-12-05 DIAGNOSIS — Z79.4 CONTROLLED TYPE 2 DIABETES MELLITUS WITH STAGE 3 CHRONIC KIDNEY DISEASE, WITH LONG-TERM CURRENT USE OF INSULIN (HCC): ICD-10-CM

## 2024-12-05 DIAGNOSIS — E78.5 DM TYPE 2 WITH DIABETIC DYSLIPIDEMIA (HCC): Primary | ICD-10-CM

## 2024-12-05 DIAGNOSIS — N18.30 CONTROLLED TYPE 2 DIABETES MELLITUS WITH STAGE 3 CHRONIC KIDNEY DISEASE, WITH LONG-TERM CURRENT USE OF INSULIN (HCC): ICD-10-CM

## 2024-12-05 RX ORDER — FAMOTIDINE 20 MG/1
TABLET, FILM COATED ORAL
Qty: 180 TABLET | Refills: 3 | Status: SHIPPED | OUTPATIENT
Start: 2024-12-05

## 2024-12-05 NOTE — PROGRESS NOTES
[facility] - [facility]    Date: 12/5/2024  Patient's Name: Nithin Thapa YOB: 1950            _____________________________________________________________________________________________    Nithin Thapa is a 74 y.o. male who has been referred by Lizzy Nuñez MD to the clinical pharmacist through the diabetes landon for medication management. Nithin Thapa was seen today for a follow-up diabetes management visit. Patient acknowledges working in consult agreement with clinical pharmacist and this provider.     The patient is currently on the following antidiabetic therapies:  Novolin 70/30 25 units QAM and 10-15 units QPM   Farxiga 10mg daily - gets through PAP  Ozempic 0.25mg every 7 weeks - gets through PAP     Completed virtual visit today for follow-up on Ozempic. Patient was last seen 11/21/24 and he noted side effects from the Ozempic. He took his third dose of 0.25mg on 11/19/24. He reports having nausea, constipation, and joint/muscle aches.     Today he reported continued constipation with Ozempic. He takes on Tuesday and usually is unable to have a bowel movement until 4 days later on Saturday. He has been using dulcolax, stool softeners, eating prunes, and drinking prune juice which has been overall ineffective at relieving the constipation. He does not want to stop the Ozempic but the side effects are bothersome. Discussed with patient and he is agreeable to try taking Ozempic every other week to see if that helps.     Will follow-up with patient in 4 weeks and if still having side effects taking Ozempic every other week, recommend discontinuing and either trying Trulicity or adjusting his insulin for better glycemic control.      11/22 - 12/5 11/8 - 11/21   Time in range ( mg/dL) 50%  Goal: >70% 62%  Goal: >70%   Time above target range High: 37%  Very high: 13% High: 33%  Very high: 3%   Time below target range Low: 0%  Very low: 0% Low:

## 2024-12-06 ENCOUNTER — TELEPHONE (OUTPATIENT)
Dept: FAMILY MEDICINE CLINIC | Age: 74
End: 2024-12-06

## 2024-12-20 ENCOUNTER — TELEPHONE (OUTPATIENT)
Dept: FAMILY MEDICINE CLINIC | Age: 74
End: 2024-12-20

## 2024-12-20 NOTE — TELEPHONE ENCOUNTER
Nurse placed a call out to the patient to let him know that his patient assistance medication Ozempic has arrived from [employer] and is ready for him to .  Patient called, patient aware and stated that he will  medications as soon as possible.

## 2024-12-20 NOTE — TELEPHONE ENCOUNTER
Patient came into the office today 12/20/24 to  medications.  Nurse asked the patient if he had any questions.  Patient stated no, nurse advised the patient if he had any questions to contact our office.  Patient verbalized understanding.

## 2024-12-31 NOTE — PROGRESS NOTES
mg/dL  Goal: <154 mg/dL   Glucose management index (GMI) 7.7%  Goal: <7.0% 7.7%  Goal: <7.0%   Glucose variability 32.6%  Goal: <36% 31.7%  Goal: <36%     Padmini Lowry, PharmD  Ambulatory Clinical Pharmacist   [facility] Office - [facility]  Phone: [phone]   Fax: [phone]     For Pharmacy Admin Tracking Only    Program: Medical Group  CPA in place:  Yes  Recommendation Provided To: Patient/Caregiver: 1 via Virtual Visit  Intervention Detail: Scheduled Appointment  Intervention Accepted By: Patient/Caregiver: 1   Time Spent (min): 30

## 2025-01-02 ENCOUNTER — PHARMACY VISIT (OUTPATIENT)
Dept: FAMILY MEDICINE CLINIC | Age: 75
End: 2025-01-02

## 2025-01-02 DIAGNOSIS — E11.69 DM TYPE 2 WITH DIABETIC DYSLIPIDEMIA (HCC): Primary | ICD-10-CM

## 2025-01-02 DIAGNOSIS — E78.5 DM TYPE 2 WITH DIABETIC DYSLIPIDEMIA (HCC): Primary | ICD-10-CM

## 2025-01-03 DIAGNOSIS — E11.69 DM TYPE 2 WITH DIABETIC DYSLIPIDEMIA (HCC): ICD-10-CM

## 2025-01-03 DIAGNOSIS — E78.5 DM TYPE 2 WITH DIABETIC DYSLIPIDEMIA (HCC): ICD-10-CM

## 2025-01-03 RX ORDER — ATORVASTATIN CALCIUM 40 MG/1
TABLET, FILM COATED ORAL
Qty: 90 TABLET | Refills: 1 | Status: SHIPPED | OUTPATIENT
Start: 2025-01-03

## 2025-01-03 NOTE — TELEPHONE ENCOUNTER
.Refill Request     CONFIRM preferred pharmacy with the patient.    If Mail Order Rx - Pend for 90 day refill.      Last Seen: Last Seen Department: 1/2/2025  Last Seen by PCP: 10/10/2024    Last Written: 1-17-24 90 with 1     If no future appointment scheduled:  Review the last OV with PCP and review information for follow-up visit,  Route STAFF MESSAGE with patient name to the  Pool for scheduling with the following information:            -  Timing of next visit           -  Visit type ie Physical, OV, etc           -  Diagnoses/Reason ie. COPD, HTN - Do not use MEDICATION, Follow-up or CHECK UP - Give reason for visit      Next Appointment:   Future Appointments   Date Time Provider Department Center   1/10/2025 11:00 AM Lizzy Nuñez MD [facility] DEP   3/4/2025 10:00 AM Corey Szymanski DO [facility]   3/6/2025  3:00 PM [facility]       Message sent to  to schedule appt with patient?  NO      Requested Prescriptions     Pending Prescriptions Disp Refills    atorvastatin (LIPITOR) 40 MG tablet [Pharmacy Med Name: Atorvastatin Calcium Oral Tablet 40 MG] 90 tablet 1     Sig: TAKE 1 TABLET BY MOUTH EVERY DAY

## 2025-01-07 ASSESSMENT — PATIENT HEALTH QUESTIONNAIRE - PHQ9
2. FEELING DOWN, DEPRESSED OR HOPELESS: NOT AT ALL
1. LITTLE INTEREST OR PLEASURE IN DOING THINGS: NOT AT ALL
SUM OF ALL RESPONSES TO PHQ9 QUESTIONS 1 & 2: 0
SUM OF ALL RESPONSES TO PHQ QUESTIONS 1-9: 0
SUM OF ALL RESPONSES TO PHQ QUESTIONS 1-9: 0
SUM OF ALL RESPONSES TO PHQ9 QUESTIONS 1 & 2: 0
1. LITTLE INTEREST OR PLEASURE IN DOING THINGS: NOT AT ALL
SUM OF ALL RESPONSES TO PHQ QUESTIONS 1-9: 0
SUM OF ALL RESPONSES TO PHQ QUESTIONS 1-9: 0
2. FEELING DOWN, DEPRESSED OR HOPELESS: NOT AT ALL

## 2025-01-10 ENCOUNTER — OFFICE VISIT (OUTPATIENT)
Dept: FAMILY MEDICINE CLINIC | Age: 75
End: 2025-01-10
Payer: MEDICARE

## 2025-01-10 VITALS
WEIGHT: 204.8 LBS | OXYGEN SATURATION: 91 % | BODY MASS INDEX: 33.06 KG/M2 | DIASTOLIC BLOOD PRESSURE: 60 MMHG | HEART RATE: 83 BPM | SYSTOLIC BLOOD PRESSURE: 110 MMHG

## 2025-01-10 DIAGNOSIS — J20.9 SUBACUTE BRONCHITIS: ICD-10-CM

## 2025-01-10 DIAGNOSIS — E11.65 UNCONTROLLED TYPE 2 DIABETES MELLITUS WITH HYPERGLYCEMIA (HCC): Primary | ICD-10-CM

## 2025-01-10 DIAGNOSIS — N18.31 CHRONIC RENAL FAILURE, STAGE 3A (HCC): ICD-10-CM

## 2025-01-10 DIAGNOSIS — E11.21 DIABETIC NEPHROPATHY ASSOCIATED WITH TYPE 2 DIABETES MELLITUS (HCC): ICD-10-CM

## 2025-01-10 DIAGNOSIS — R97.20 ELEVATED PROSTATE SPECIFIC ANTIGEN (PSA): ICD-10-CM

## 2025-01-10 LAB — HBA1C MFR BLD: 7.6 %

## 2025-01-10 PROCEDURE — 1160F RVW MEDS BY RX/DR IN RCRD: CPT | Performed by: FAMILY MEDICINE

## 2025-01-10 PROCEDURE — 3017F COLORECTAL CA SCREEN DOC REV: CPT | Performed by: FAMILY MEDICINE

## 2025-01-10 PROCEDURE — G8417 CALC BMI ABV UP PARAM F/U: HCPCS | Performed by: FAMILY MEDICINE

## 2025-01-10 PROCEDURE — 3078F DIAST BP <80 MM HG: CPT | Performed by: FAMILY MEDICINE

## 2025-01-10 PROCEDURE — 3051F HG A1C>EQUAL 7.0%<8.0%: CPT | Performed by: FAMILY MEDICINE

## 2025-01-10 PROCEDURE — 1123F ACP DISCUSS/DSCN MKR DOCD: CPT | Performed by: FAMILY MEDICINE

## 2025-01-10 PROCEDURE — 99214 OFFICE O/P EST MOD 30 MIN: CPT | Performed by: FAMILY MEDICINE

## 2025-01-10 PROCEDURE — 1159F MED LIST DOCD IN RCRD: CPT | Performed by: FAMILY MEDICINE

## 2025-01-10 PROCEDURE — 3074F SYST BP LT 130 MM HG: CPT | Performed by: FAMILY MEDICINE

## 2025-01-10 PROCEDURE — 1036F TOBACCO NON-USER: CPT | Performed by: FAMILY MEDICINE

## 2025-01-10 PROCEDURE — G8427 DOCREV CUR MEDS BY ELIG CLIN: HCPCS | Performed by: FAMILY MEDICINE

## 2025-01-10 PROCEDURE — 83036 HEMOGLOBIN GLYCOSYLATED A1C: CPT | Performed by: FAMILY MEDICINE

## 2025-01-10 PROCEDURE — 2022F DILAT RTA XM EVC RTNOPTHY: CPT | Performed by: FAMILY MEDICINE

## 2025-01-10 RX ORDER — AZITHROMYCIN 250 MG/1
TABLET, FILM COATED ORAL
Qty: 6 TABLET | Refills: 0 | Status: SHIPPED | OUTPATIENT
Start: 2025-01-10 | End: 2025-01-20

## 2025-01-10 SDOH — ECONOMIC STABILITY: FOOD INSECURITY: WITHIN THE PAST 12 MONTHS, THE FOOD YOU BOUGHT JUST DIDN'T LAST AND YOU DIDN'T HAVE MONEY TO GET MORE.: NEVER TRUE

## 2025-01-10 SDOH — ECONOMIC STABILITY: FOOD INSECURITY: WITHIN THE PAST 12 MONTHS, YOU WORRIED THAT YOUR FOOD WOULD RUN OUT BEFORE YOU GOT MONEY TO BUY MORE.: NEVER TRUE

## 2025-01-14 ASSESSMENT — ENCOUNTER SYMPTOMS
COUGH: 1
SHORTNESS OF BREATH: 0
WHEEZING: 0

## 2025-01-14 NOTE — ASSESSMENT & PLAN NOTE
Chronic, not at goal (unstable), continue to titrate Ozempic up as tolerated every 4 weeks until he reaches 2 mg dose. Continue Farxiga and insulin 70/30. Decrease night time dose to 10 units from 15 units to avoid continued nocturnal hypoglycemia.     Orders:    POCT glycosylated hemoglobin (Hb A1C)    HM DIABETES FOOT EXAM

## 2025-01-16 ENCOUNTER — PATIENT MESSAGE (OUTPATIENT)
Dept: FAMILY MEDICINE CLINIC | Age: 75
End: 2025-01-16

## 2025-01-16 DIAGNOSIS — T14.8XXA WOUND OF SKIN: ICD-10-CM

## 2025-01-17 RX ORDER — MUPIROCIN 20 MG/G
OINTMENT TOPICAL
Qty: 15 G | Refills: 0 | Status: SHIPPED | OUTPATIENT
Start: 2025-01-17 | End: 2025-01-24

## 2025-01-17 NOTE — TELEPHONE ENCOUNTER
Refill Request     CONFIRM preferred pharmacy with the patient.    If Mail Order Rx - Pend for 90 day refill.      Last Seen: Last Seen Department: 1/10/2025  Last Seen by PCP: 1/10/2025    Last Written: 9/27/2024    If no future appointment scheduled:  Review the last OV with PCP and review information for follow-up visit,  Route STAFF MESSAGE with patient name to the  Pool for scheduling with the following information:            -  Timing of next visit           -  Visit type ie Physical, OV, etc           -  Diagnoses/Reason ie. COPD, HTN - Do not use MEDICATION, Follow-up or CHECK UP - Give reason for visit      Next Appointment:   Future Appointments   Date Time Provider Department Center   3/4/2025 10:00 AM [facility]   3/6/2025  3:00 PM EFM - PHARMD-DIABETES MONI HAND[facility] DEP   4/10/2025  1:30 PM Lizzy Nuñez MD [facility] DEP       Message sent to  to schedule appt with patient?  NO      Requested Prescriptions     Pending Prescriptions Disp Refills    mupirocin (BACTROBAN) 2 % ointment 15 g 0     Sig: Apply topically 2 times daily.

## 2025-01-23 ENCOUNTER — HOSPITAL ENCOUNTER (OUTPATIENT)
Dept: GENERAL RADIOLOGY | Age: 75
Discharge: HOME | End: 2025-01-23
Payer: MEDICARE

## 2025-01-23 ENCOUNTER — OFFICE VISIT (OUTPATIENT)
Dept: FAMILY MEDICINE CLINIC | Age: 75
End: 2025-01-23

## 2025-01-23 VITALS
HEART RATE: 94 BPM | OXYGEN SATURATION: 97 % | TEMPERATURE: 97.7 F | RESPIRATION RATE: 18 BRPM | BODY MASS INDEX: 32.4 KG/M2 | DIASTOLIC BLOOD PRESSURE: 84 MMHG | SYSTOLIC BLOOD PRESSURE: 138 MMHG | HEIGHT: 66 IN | WEIGHT: 201.6 LBS

## 2025-01-23 DIAGNOSIS — R30.0 DYSURIA: ICD-10-CM

## 2025-01-23 DIAGNOSIS — R05.3 PERSISTENT COUGH: ICD-10-CM

## 2025-01-23 DIAGNOSIS — J10.1 INFLUENZA A: Primary | ICD-10-CM

## 2025-01-23 DIAGNOSIS — R80.9 PROTEINURIA, UNSPECIFIED TYPE: ICD-10-CM

## 2025-01-23 LAB
BILIRUBIN, POC: ABNORMAL
BLOOD URINE, POC: ABNORMAL
CLARITY, POC: ABNORMAL
COLOR, POC: ABNORMAL
GLUCOSE URINE, POC: 1000 MG/DL
INFLUENZA A ANTIBODY: POSITIVE
INFLUENZA B ANTIBODY: NEGATIVE
KETONES, POC: 15 MG/DL
LEUKOCYTE EST, POC: ABNORMAL
NITRITE, POC: ABNORMAL
PH, POC: 6
PROTEIN, POC: >300 MG/DL
SPECIFIC GRAVITY, POC: 1.01
UROBILINOGEN, POC: 0.2 MG/DL

## 2025-01-23 PROCEDURE — 71046 X-RAY EXAM CHEST 2 VIEWS: CPT

## 2025-01-23 RX ORDER — OSELTAMIVIR PHOSPHATE 30 MG/1
30 CAPSULE ORAL 2 TIMES DAILY
Qty: 10 CAPSULE | Refills: 0 | Status: SHIPPED | OUTPATIENT
Start: 2025-01-23 | End: 2025-01-28

## 2025-01-23 SDOH — ECONOMIC STABILITY: FOOD INSECURITY: WITHIN THE PAST 12 MONTHS, THE FOOD YOU BOUGHT JUST DIDN'T LAST AND YOU DIDN'T HAVE MONEY TO GET MORE.: NEVER TRUE

## 2025-01-23 SDOH — ECONOMIC STABILITY: FOOD INSECURITY: WITHIN THE PAST 12 MONTHS, YOU WORRIED THAT YOUR FOOD WOULD RUN OUT BEFORE YOU GOT MONEY TO BUY MORE.: NEVER TRUE

## 2025-01-23 NOTE — PROGRESS NOTES
[facility] Primary Care  Established care visit   2025    Nithin Thapa (:  1950) is a 74 y.o. male    Chief Complaint   Patient presents with    Cough     Congestion   Fever   Productive cough   Vomiting  Diarrhea   Fever ( didn't check)   Body aches   X   Exposed illnesses with grandchildern but nothing confirmed   Denies HA, vomiting, sore throat           ASSESSMENT/ PLAN  1. Influenza A    - POCT Influenza A/B  - COVID-19  - oseltamivir (TAMIFLU) 30 MG capsule; Take 1 capsule by mouth 2 times daily for 5 days  Dispense: 10 capsule; Refill: 0    -Discussed with pt possible SEs of tamiflu: recommend taking with food to prevent GI distress     Advised to seek immediate medical care if:    You have trouble breathing.     You have a fever with a stiff neck or a severe headache.     You have pain or pressure in your chest or belly.     You have a fever or cough that returns after getting better.     You feel very sleepy, dizzy, or confused.     You are not urinating.     You have severe muscle pain.     You have severe weakness, or you are unsteady.     You have medical conditions that are getting worse.   Watch closely for changes in your health, and be sure to contact your doctor if:    You do not get better as expected.     You are having a problem with your medicine.     Results for orders placed or performed in visit on 25   POCT Influenza A/B   Result Value Ref Range    Influenza A Ab POSITIVE     Influenza B Ab NEGATIVE    POCT Urinalysis no Micro   Result Value Ref Range    Color, UA      Clarity, UA      Glucose, UA POC 1,000 mg/dL    Bilirubin, UA NEG     Ketones, UA 15 mg/dL    Spec Grav, UA 1.015     Blood, UA POC NEG     pH, UA 6.0     Protein, UA POC >300 mg/dL    Urobilinogen, UA 0.2 mg/dL    Leukocytes, UA NEG     Nitrite, UA NEG       2. Persistent cough       - XR CHEST STANDARD (2 VW); Future    3. Dysuria    - POCT Urinalysis no Micro    Results for orders placed or

## 2025-01-24 LAB — SARS-COV-2 RNA RESP QL NAA+PROBE: NOT DETECTED

## 2025-01-24 ASSESSMENT — ENCOUNTER SYMPTOMS
CONSTIPATION: 0
SHORTNESS OF BREATH: 0
SINUS PAIN: 0
DIARRHEA: 0
RHINORRHEA: 0
VOMITING: 0
BLOOD IN STOOL: 0
COLOR CHANGE: 0
CHEST TIGHTNESS: 0
FACIAL SWELLING: 0
VOICE CHANGE: 0
SORE THROAT: 0
TROUBLE SWALLOWING: 0
ABDOMINAL DISTENTION: 0
ABDOMINAL PAIN: 0
SINUS PRESSURE: 0
NAUSEA: 0
GASTROINTESTINAL NEGATIVE: 1
COUGH: 1
WHEEZING: 0

## 2025-01-31 ENCOUNTER — HOSPITAL ENCOUNTER (OUTPATIENT)
Age: 75
Discharge: HOME | End: 2025-01-31
Payer: MEDICARE

## 2025-01-31 LAB
BILIRUB UR QL STRIP.AUTO: NEGATIVE
CLARITY UR: CLEAR
COLOR UR: YELLOW
GLUCOSE UR STRIP.AUTO-MCNC: >=1000 MG/DL
HGB UR QL STRIP.AUTO: NEGATIVE
KETONES UR STRIP.AUTO-MCNC: NEGATIVE MG/DL
LEUKOCYTE ESTERASE UR QL STRIP.AUTO: NEGATIVE
NITRITE UR QL STRIP.AUTO: NEGATIVE
PH UR STRIP.AUTO: 7 [PH] (ref 5–8)
PROT UR STRIP.AUTO-MCNC: 100 MG/DL
RBC #/AREA URNS HPF: NORMAL /HPF (ref 0–4)
SP GR UR STRIP.AUTO: 1.01 (ref 1–1.03)
UA DIPSTICK W REFLEX MICRO PNL UR: YES
URN SPEC COLLECT METH UR: ABNORMAL
UROBILINOGEN UR STRIP-ACNC: 0.2 E.U./DL
WBC #/AREA URNS HPF: NORMAL /HPF (ref 0–5)

## 2025-01-31 PROCEDURE — 83735 ASSAY OF MAGNESIUM: CPT

## 2025-01-31 PROCEDURE — 82306 VITAMIN D 25 HYDROXY: CPT

## 2025-01-31 PROCEDURE — 82043 UR ALBUMIN QUANTITATIVE: CPT

## 2025-01-31 PROCEDURE — 82570 ASSAY OF URINE CREATININE: CPT

## 2025-01-31 PROCEDURE — 36415 COLL VENOUS BLD VENIPUNCTURE: CPT

## 2025-01-31 PROCEDURE — 81001 URINALYSIS AUTO W/SCOPE: CPT

## 2025-01-31 PROCEDURE — 83970 ASSAY OF PARATHORMONE: CPT

## 2025-01-31 PROCEDURE — 80069 RENAL FUNCTION PANEL: CPT

## 2025-01-31 PROCEDURE — 84156 ASSAY OF PROTEIN URINE: CPT

## 2025-02-01 LAB
25(OH)D3 SERPL-MCNC: 31.2 NG/ML
ALBUMIN SERPL-MCNC: 4.1 G/DL (ref 3.4–5)
ANION GAP SERPL CALCULATED.3IONS-SCNC: 12 MMOL/L (ref 3–16)
BUN SERPL-MCNC: 13 MG/DL (ref 7–20)
CALCIUM SERPL-MCNC: 9.6 MG/DL (ref 8.3–10.6)
CHLORIDE SERPL-SCNC: 104 MMOL/L (ref 99–110)
CO2 SERPL-SCNC: 26 MMOL/L (ref 21–32)
CREAT SERPL-MCNC: 1.3 MG/DL (ref 0.8–1.3)
CREAT UR-MCNC: 86.9 MG/DL (ref 39–259)
GFR SERPLBLD CREATININE-BSD FMLA CKD-EPI: 57 ML/MIN/{1.73_M2}
GLUCOSE SERPL-MCNC: 124 MG/DL (ref 70–99)
MAGNESIUM SERPL-MCNC: 1.98 MG/DL (ref 1.8–2.4)
MICROALBUMIN UR DL<=1MG/L-MCNC: 50.5 MG/DL
MICROALBUMIN/CREAT UR: 581.1 MG/G (ref 0–30)
PHOSPHATE SERPL-MCNC: 3.5 MG/DL (ref 2.5–4.9)
POTASSIUM SERPL-SCNC: 4.3 MMOL/L (ref 3.5–5.1)
PROT UR-MCNC: 77.5 MG/DL
PROT/CREAT UR-RTO: 0.9 MG/DL
PTH-INTACT SERPL-MCNC: 53.3 PG/ML (ref 14–72)
SODIUM SERPL-SCNC: 142 MMOL/L (ref 136–145)

## 2025-02-06 ENCOUNTER — OFFICE VISIT (OUTPATIENT)
Dept: FAMILY MEDICINE CLINIC | Age: 75
End: 2025-02-06
Payer: MEDICARE

## 2025-02-06 VITALS
SYSTOLIC BLOOD PRESSURE: 128 MMHG | WEIGHT: 206.8 LBS | RESPIRATION RATE: 18 BRPM | BODY MASS INDEX: 33.38 KG/M2 | TEMPERATURE: 97.7 F | OXYGEN SATURATION: 97 % | DIASTOLIC BLOOD PRESSURE: 62 MMHG | HEART RATE: 74 BPM

## 2025-02-06 DIAGNOSIS — R05.3 PERSISTENT COUGH: ICD-10-CM

## 2025-02-06 DIAGNOSIS — R06.2 WHEEZING: Primary | ICD-10-CM

## 2025-02-06 DIAGNOSIS — I10 DIABETES MELLITUS WITH COINCIDENT HYPERTENSION (HCC): ICD-10-CM

## 2025-02-06 DIAGNOSIS — E11.9 DIABETES MELLITUS WITH COINCIDENT HYPERTENSION (HCC): ICD-10-CM

## 2025-02-06 LAB
BASOPHILS # BLD: 0.1 K/UL (ref 0–0.2)
BASOPHILS NFR BLD: 1 %
DEPRECATED RDW RBC AUTO: 13.6 % (ref 12.4–15.4)
EOSINOPHIL # BLD: 0.3 K/UL (ref 0–0.6)
EOSINOPHIL NFR BLD: 4.4 %
HCT VFR BLD AUTO: 40.4 % (ref 40.5–52.5)
HGB BLD-MCNC: 13.7 G/DL (ref 13.5–17.5)
LYMPHOCYTES # BLD: 1.4 K/UL (ref 1–5.1)
LYMPHOCYTES NFR BLD: 24.5 %
MCH RBC QN AUTO: 29.3 PG (ref 26–34)
MCHC RBC AUTO-ENTMCNC: 33.9 G/DL (ref 31–36)
MCV RBC AUTO: 86.4 FL (ref 80–100)
MONOCYTES # BLD: 0.5 K/UL (ref 0–1.3)
MONOCYTES NFR BLD: 8.2 %
NEUTROPHILS # BLD: 3.5 K/UL (ref 1.7–7.7)
NEUTROPHILS NFR BLD: 61.9 %
PLATELET # BLD AUTO: 348 K/UL (ref 135–450)
PMV BLD AUTO: 8.5 FL (ref 5–10.5)
RBC # BLD AUTO: 4.67 M/UL (ref 4.2–5.9)
WBC # BLD AUTO: 5.7 K/UL (ref 4–11)

## 2025-02-06 PROCEDURE — 3017F COLORECTAL CA SCREEN DOC REV: CPT | Performed by: NURSE PRACTITIONER

## 2025-02-06 PROCEDURE — 3078F DIAST BP <80 MM HG: CPT | Performed by: NURSE PRACTITIONER

## 2025-02-06 PROCEDURE — 99214 OFFICE O/P EST MOD 30 MIN: CPT | Performed by: NURSE PRACTITIONER

## 2025-02-06 PROCEDURE — 3051F HG A1C>EQUAL 7.0%<8.0%: CPT | Performed by: NURSE PRACTITIONER

## 2025-02-06 PROCEDURE — G8427 DOCREV CUR MEDS BY ELIG CLIN: HCPCS | Performed by: NURSE PRACTITIONER

## 2025-02-06 PROCEDURE — 2022F DILAT RTA XM EVC RTNOPTHY: CPT | Performed by: NURSE PRACTITIONER

## 2025-02-06 PROCEDURE — 1036F TOBACCO NON-USER: CPT | Performed by: NURSE PRACTITIONER

## 2025-02-06 PROCEDURE — 3074F SYST BP LT 130 MM HG: CPT | Performed by: NURSE PRACTITIONER

## 2025-02-06 PROCEDURE — 1159F MED LIST DOCD IN RCRD: CPT | Performed by: NURSE PRACTITIONER

## 2025-02-06 PROCEDURE — G8417 CALC BMI ABV UP PARAM F/U: HCPCS | Performed by: NURSE PRACTITIONER

## 2025-02-06 PROCEDURE — 1123F ACP DISCUSS/DSCN MKR DOCD: CPT | Performed by: NURSE PRACTITIONER

## 2025-02-06 RX ORDER — ALBUTEROL SULFATE 90 UG/1
2 INHALANT RESPIRATORY (INHALATION) 4 TIMES DAILY PRN
Qty: 18 G | Refills: 0 | Status: SHIPPED | OUTPATIENT
Start: 2025-02-06

## 2025-02-06 RX ORDER — FLUTICASONE PROPIONATE AND SALMETEROL 100; 50 UG/1; UG/1
1 POWDER RESPIRATORY (INHALATION) EVERY 12 HOURS
Qty: 1 EACH | Refills: 0 | Status: SHIPPED | OUTPATIENT
Start: 2025-02-06

## 2025-02-06 RX ORDER — NIFEDIPINE 60 MG/1
60 TABLET, EXTENDED RELEASE ORAL DAILY
COMMUNITY
Start: 2025-02-03 | End: 2025-05-04

## 2025-02-06 ASSESSMENT — ENCOUNTER SYMPTOMS
WHEEZING: 1
ABDOMINAL DISTENTION: 0
SORE THROAT: 0
GASTROINTESTINAL NEGATIVE: 1
VOICE CHANGE: 0
CHEST TIGHTNESS: 0
TROUBLE SWALLOWING: 0
BLOOD IN STOOL: 0
FACIAL SWELLING: 0
SINUS PRESSURE: 0
VOMITING: 0
COUGH: 1
RHINORRHEA: 0
CONSTIPATION: 0
DIARRHEA: 0
COLOR CHANGE: 0
SINUS PAIN: 0
NAUSEA: 0
SHORTNESS OF BREATH: 0
ABDOMINAL PAIN: 0

## 2025-02-06 NOTE — PROGRESS NOTES
[facility]  Establish care visit   2025    Nithin Thapa (:  1950) is a 74 y.o. male    Chief Complaint   Patient presents with    Cough     X 3 months, now in upper chest. Denied other symptoms.        ASSESSMENT/ PLAN  1. Wheezing    - Full PFT Study With Bronchodilator; Future  - fluticasone-salmeterol (ADVAIR) 100-50 MCG/ACT AEPB diskus inhaler; Inhale 1 puff into the lungs in the morning and 1 puff in the evening.  Dispense: 1 each; Refill: 0  - albuterol sulfate HFA (VENTOLIN HFA) 108 (90 Base) MCG/ACT inhaler; Inhale 2 puffs into the lungs 4 times daily as needed for Wheezing  Dispense: 18 g; Refill: 0  -Advised to rinse mouth out with water after using advair inhaler to prevent thrush   2. Persistent cough    - Quantiferon TB Gold; Future  - CBC with Auto Differential; Future    3. Diabetes mellitus with coincident hypertension (HCC)    -DM is not controlled: A1c goal is less than 7  Hemoglobin A1C   Date Value Ref Range Status   01/10/2025 7.6 % Final   -Continue semaglutide, 70/30 insulin, and farxiga as prescribed  -HTN is well controlled: Bp goal is less than 130/80  -Continue amlodipine and losartan as prescribed     Return if symptoms worsen or fail to improve.    HPI  Pt is c/o persistent cough that has been present since November. Wife is present with patient at today's appointment and reports that her  had a similar coughing episode that lingered just during the fall/winter months in the past.   Pt was seen on 25 and diagnosed with influenza A. Was prescribed tamiflu and finished the medication, but still has lingering cough and wheezing. Had taken azithromycin towards the beginning of January for subacute bronchitis, but no other antibiotics since then.    Denies any hemoptysis, night sweats, or fever/chills. Will sometimes cough up clear sputum and dry at other times.  Had chest xray performed on 25:  IMPRESSION:  Minimal curvilinear atelectasis within

## 2025-02-10 LAB
GAMMA INTERFERON BACKGROUND BLD IA-ACNC: 0.04 IU/ML
M TB IFN-G BLD-IMP: NEGATIVE
M TB IFN-G CD4+ BCKGRND COR BLD-ACNC: 0 IU/ML
M TB IFN-G CD4+CD8+ BCKGRND COR BLD-ACNC: 0 IU/ML
MITOGEN IGNF BCKGRD COR BLD-ACNC: 9.96 IU/ML

## 2025-02-13 ENCOUNTER — HOSPITAL ENCOUNTER (OUTPATIENT)
Dept: PULMONOLOGY | Age: 75
Discharge: HOME | End: 2025-02-13
Payer: MEDICARE

## 2025-02-13 DIAGNOSIS — R06.2 WHEEZING: ICD-10-CM

## 2025-02-13 PROCEDURE — 94010 BREATHING CAPACITY TEST: CPT

## 2025-02-13 PROCEDURE — 94760 N-INVAS EAR/PLS OXIMETRY 1: CPT

## 2025-02-13 PROCEDURE — 94729 DIFFUSING CAPACITY: CPT

## 2025-02-13 PROCEDURE — 94726 PLETHYSMOGRAPHY LUNG VOLUMES: CPT

## 2025-02-14 PROBLEM — R06.2 WHEEZING: Status: ACTIVE | Noted: 2025-02-14

## 2025-02-14 NOTE — PROCEDURES
[address] [address]                           PULMONARY FUNCTION      PATIENT NAME: CK MARSH             : 1950  MED REC NO: [id number]                      ROOM:   ACCOUNT NO: [id number]                       ADMIT DATE: 2025  PROVIDER: Osito Yanes MD      DATE OF PROCEDURE: 2025    SURGEON:  Osito Yanes MD    REFERRING PHYSICIAN:  KARTHIK ODELL    STUDY PERFORMED:  Pulmonary function test.    INDICATION:  Shortness of breath.    INTERPRETATION:    1. Spirometry revealed no evidence of obstructive defect.  FEV1 is 2.49 L, which is 97% predicted.  FEV1/FVC ratio of 76%.  2. Lung volumes reveal normal total lung capacity 5.28 L which is 84% predicted.  3. Diffusion capacity is mildly decreased at 20.89, which is 77% predicted.  4. Flow volume loops appear to be normal.    CONCLUSION:    1. No evidence of obstructive defect or restrictive defect.  2. Isolated mildly decreased diffusion capacity that could be seen in ILD, emphysema, pulmonary vascular disease.  Clinical correlation is warranted.          OSITO YANES MD      D:  2025 17:04:37     T:  2025 22:54:16     /ANN  Job #:  [id number]     Doc#:  [id number]

## 2025-02-25 DIAGNOSIS — R06.02 SHORTNESS OF BREATH: ICD-10-CM

## 2025-02-25 DIAGNOSIS — R94.2 ABNORMAL PFT: Primary | ICD-10-CM

## 2025-02-26 NOTE — PROGRESS NOTES
that he use his hearing aids    Follow-up in 6 months or sooner if needed  Corey Szymanski DO  03/04/25      Medical Decision Making:  The following items were considered in medical decision making:  Independent review of images  Review / order clinical lab tests  Review / order radiology tests  Decision to obtain old records

## 2025-02-27 ENCOUNTER — TELEPHONE (OUTPATIENT)
Dept: PULMONOLOGY | Age: 75
End: 2025-02-27

## 2025-02-27 NOTE — TELEPHONE ENCOUNTER
Rvd referral for Pulm to schedule with Nirav or Ateeli first zaynab.    2.27.25- Lvm tcb to office an schedule

## 2025-03-04 ENCOUNTER — OFFICE VISIT (OUTPATIENT)
Dept: ENT CLINIC | Age: 75
End: 2025-03-04
Payer: MEDICARE

## 2025-03-04 VITALS
DIASTOLIC BLOOD PRESSURE: 80 MMHG | HEART RATE: 69 BPM | HEIGHT: 66 IN | BODY MASS INDEX: 33.11 KG/M2 | SYSTOLIC BLOOD PRESSURE: 170 MMHG | WEIGHT: 206 LBS

## 2025-03-04 DIAGNOSIS — H90.3 SENSORINEURAL HEARING LOSS (SNHL) OF BOTH EARS: ICD-10-CM

## 2025-03-04 DIAGNOSIS — H61.23 BILATERAL IMPACTED CERUMEN: Primary | ICD-10-CM

## 2025-03-04 PROCEDURE — 3017F COLORECTAL CA SCREEN DOC REV: CPT | Performed by: STUDENT IN AN ORGANIZED HEALTH CARE EDUCATION/TRAINING PROGRAM

## 2025-03-04 PROCEDURE — 3077F SYST BP >= 140 MM HG: CPT | Performed by: STUDENT IN AN ORGANIZED HEALTH CARE EDUCATION/TRAINING PROGRAM

## 2025-03-04 PROCEDURE — G8417 CALC BMI ABV UP PARAM F/U: HCPCS | Performed by: STUDENT IN AN ORGANIZED HEALTH CARE EDUCATION/TRAINING PROGRAM

## 2025-03-04 PROCEDURE — 1160F RVW MEDS BY RX/DR IN RCRD: CPT | Performed by: STUDENT IN AN ORGANIZED HEALTH CARE EDUCATION/TRAINING PROGRAM

## 2025-03-04 PROCEDURE — 1123F ACP DISCUSS/DSCN MKR DOCD: CPT | Performed by: STUDENT IN AN ORGANIZED HEALTH CARE EDUCATION/TRAINING PROGRAM

## 2025-03-04 PROCEDURE — G8427 DOCREV CUR MEDS BY ELIG CLIN: HCPCS | Performed by: STUDENT IN AN ORGANIZED HEALTH CARE EDUCATION/TRAINING PROGRAM

## 2025-03-04 PROCEDURE — 3079F DIAST BP 80-89 MM HG: CPT | Performed by: STUDENT IN AN ORGANIZED HEALTH CARE EDUCATION/TRAINING PROGRAM

## 2025-03-04 PROCEDURE — 1159F MED LIST DOCD IN RCRD: CPT | Performed by: STUDENT IN AN ORGANIZED HEALTH CARE EDUCATION/TRAINING PROGRAM

## 2025-03-04 PROCEDURE — 69210 REMOVE IMPACTED EAR WAX UNI: CPT | Performed by: STUDENT IN AN ORGANIZED HEALTH CARE EDUCATION/TRAINING PROGRAM

## 2025-03-04 PROCEDURE — 99213 OFFICE O/P EST LOW 20 MIN: CPT | Performed by: STUDENT IN AN ORGANIZED HEALTH CARE EDUCATION/TRAINING PROGRAM

## 2025-03-04 PROCEDURE — 1036F TOBACCO NON-USER: CPT | Performed by: STUDENT IN AN ORGANIZED HEALTH CARE EDUCATION/TRAINING PROGRAM

## 2025-03-06 ENCOUNTER — TELEPHONE (OUTPATIENT)
Dept: FAMILY MEDICINE CLINIC | Age: 75
End: 2025-03-06

## 2025-03-06 NOTE — TELEPHONE ENCOUNTER
[facility] - [facility]    Date: 3/6/2025  Patient's Name: Nithin Thapa YOB: 1950            _____________________________________________________________________________________________    Patient no showed scheduled pharmD visit for DM landon. Left message for patient to reschedule. Patient instructed to call the office at [phone] to reschedule. Will continue to outreach as appropriate.    Padmini Lowry, PharmD  Ambulatory Clinical Pharmacist   [facility] Office - [facility]  Phone: [phone]   Fax: [phone]    For Pharmacy Admin Tracking Only    Program: Medical Group  Time Spent (min): 15

## 2025-03-13 ENCOUNTER — HOSPITAL ENCOUNTER (OUTPATIENT)
Age: 75
Discharge: HOME | End: 2025-03-15
Payer: MEDICARE

## 2025-03-13 ENCOUNTER — PHARMACY VISIT (OUTPATIENT)
Dept: FAMILY MEDICINE CLINIC | Age: 75
End: 2025-03-13

## 2025-03-13 VITALS
DIASTOLIC BLOOD PRESSURE: 80 MMHG | WEIGHT: 206 LBS | BODY MASS INDEX: 33.11 KG/M2 | SYSTOLIC BLOOD PRESSURE: 170 MMHG | HEIGHT: 66 IN

## 2025-03-13 DIAGNOSIS — E11.65 UNCONTROLLED TYPE 2 DIABETES MELLITUS WITH HYPERGLYCEMIA (HCC): Primary | ICD-10-CM

## 2025-03-13 DIAGNOSIS — R06.02 SHORTNESS OF BREATH: ICD-10-CM

## 2025-03-13 LAB
ECHO AO ASC DIAM: 3.4 CM
ECHO AO ASCENDING AORTA INDEX: 1.67 CM/M2
ECHO AO ROOT DIAM: 3.2 CM
ECHO AO ROOT INDEX: 1.58 CM/M2
ECHO AV MEAN GRADIENT: 4 MMHG
ECHO AV MEAN GRADIENT: 4 MMHG
ECHO AV MEAN VELOCITY: 1 M/S
ECHO AV PEAK GRADIENT: 8 MMHG
ECHO AV PEAK VELOCITY: 1.4 M/S
ECHO AV VELOCITY RATIO: 0.71
ECHO AV VTI: 30.4 CM
ECHO BSA: 2.09 M2
ECHO EST RA PRESSURE: 3 MMHG
ECHO IVC EXP: 1.6 CM
ECHO IVC INSP: 0.8 CM
ECHO LA AREA 2C: 18.4 CM2
ECHO LA AREA 4C: 20.7 CM2
ECHO LA MAJOR AXIS: 5.7 CM
ECHO LA MINOR AXIS: 5.5 CM
ECHO LA VOL BP: 56 ML (ref 18–58)
ECHO LA VOL MOD A2C: 50 ML (ref 18–58)
ECHO LA VOL MOD A4C: 61 ML (ref 18–58)
ECHO LA VOL/BSA BIPLANE: 28 ML/M2 (ref 16–34)
ECHO LA VOLUME INDEX MOD A2C: 25 ML/M2 (ref 16–34)
ECHO LA VOLUME INDEX MOD A4C: 30 ML/M2 (ref 16–34)
ECHO LV E' LATERAL VELOCITY: 7.51 CM/S
ECHO LV E' SEPTAL VELOCITY: 5.55 CM/S
ECHO LV EDV A2C: 65 ML
ECHO LV EDV A4C: 79 ML
ECHO LV EDV INDEX A4C: 39 ML/M2
ECHO LV EDV NDEX A2C: 32 ML/M2
ECHO LV EF PHYSICIAN: 58 %
ECHO LV EJECTION FRACTION A2C: 62 %
ECHO LV EJECTION FRACTION A4C: 51 %
ECHO LV EJECTION FRACTION BIPLANE: 57 % (ref 55–100)
ECHO LV ESV A2C: 25 ML
ECHO LV ESV A4C: 39 ML
ECHO LV ESV INDEX A2C: 12 ML/M2
ECHO LV ESV INDEX A4C: 19 ML/M2
ECHO LV FRACTIONAL SHORTENING: 40 % (ref 28–44)
ECHO LV INTERNAL DIMENSION DIASTOLE INDEX: 2.22 CM/M2
ECHO LV INTERNAL DIMENSION DIASTOLIC: 4.5 CM (ref 4.2–5.9)
ECHO LV INTERNAL DIMENSION SYSTOLIC INDEX: 1.33 CM/M2
ECHO LV INTERNAL DIMENSION SYSTOLIC: 2.7 CM
ECHO LV ISOVOLUMETRIC RELAXATION TIME (IVRT): 100 MS
ECHO LV IVSD: 1.2 CM (ref 0.6–1)
ECHO LV MASS 2D: 186.4 G (ref 88–224)
ECHO LV MASS INDEX 2D: 91.8 G/M2 (ref 49–115)
ECHO LV POSTERIOR WALL DIASTOLIC: 1.1 CM (ref 0.6–1)
ECHO LV RELATIVE WALL THICKNESS RATIO: 0.49
ECHO LVOT AV VTI INDEX: 0.79
ECHO LVOT MEAN GRADIENT: 2 MMHG
ECHO LVOT PEAK GRADIENT: 4 MMHG
ECHO LVOT PEAK VELOCITY: 1 M/S
ECHO LVOT VTI: 24.1 CM
ECHO MV A VELOCITY: 1.11 M/S
ECHO MV E DECELERATION TIME (DT): 261 MS
ECHO MV E VELOCITY: 0.81 M/S
ECHO MV E/A RATIO: 0.73
ECHO MV E/E' LATERAL: 10.79
ECHO MV E/E' RATIO (AVERAGED): 12.69
ECHO MV E/E' SEPTAL: 14.59
ECHO MV LVOT VTI INDEX: 1.24
ECHO MV MAX VELOCITY: 1.2 M/S
ECHO MV MEAN GRADIENT: 2 MMHG
ECHO MV MEAN VELOCITY: 0.7 M/S
ECHO MV PEAK GRADIENT: 6 MMHG
ECHO MV VTI: 29.9 CM
ECHO PV MAX VELOCITY: 1.1 M/S
ECHO PV MEAN GRADIENT: 3 MMHG
ECHO PV MEAN VELOCITY: 0.8 M/S
ECHO PV PEAK GRADIENT: 5 MMHG
ECHO PV VTI: 24.8 CM
ECHO RA AREA 4C: 13.2 CM2
ECHO RA END SYSTOLIC VOLUME APICAL 4 CHAMBER INDEX BSA: 13 ML/M2
ECHO RA VOLUME: 27 ML
ECHO RV BASAL DIMENSION: 3.9 CM
ECHO RV FREE WALL PEAK S': 13.1 CM/S
ECHO RV LONGITUDINAL DIMENSION: 8 CM
ECHO RV MID DIMENSION: 2.5 CM
ECHO RV TAPSE: 2.5 CM (ref 1.7–?)

## 2025-03-13 PROCEDURE — 93306 TTE W/DOPPLER COMPLETE: CPT

## 2025-03-13 NOTE — PROGRESS NOTES
Clinical Pharmacy Note    Nithin Thapa is a 74 y.o. male who has been referred by Lizzy Nuñez MD to the clinical pharmacist through the diabetes mariel for medication management. Nithin Thapa was seen today for a follow-up diabetes management visit. Patient acknowledges working in consult agreement with clinical pharmacist and this provider.    Past Medical History:   Diagnosis Date    Controlled type 2 diabetes mellitus with stage 3 chronic kidney disease, with long-term current use of insulin (HCC) 1/9/2024    Diabetic retinopathy (HCC)     Dizziness     motion/ car sick    Erectile dysfunction 6/6/2011    GERD (gastroesophageal reflux disease)     Hearing loss     Hyperlipidemia     Low back pain 6/6/2011    Severe obesity (BMI 35.0-39.9) with comorbidity 2/8/2022    Type II or unspecified type diabetes mellitus without mention of complication, not stated as uncontrolled       Family History   Problem Relation Age of Onset    Stroke Mother     Cancer Father     Diabetes Father     Diabetes Maternal Grandfather     Diabetes Paternal Grandmother      Patient insurance: Single [employer] and [employer]  Patient's preferred pharmacy: [facility] for DM supplies     REVIEW OF DIABETES & LIFESTYLE  Nithin Thapa is here for an evaluation of their type II diabetes mellitus. The patient was diagnosed in June 2011. Reviewed the signs and symptoms of hypoglycemia with patient and how to treat hypoglycemic episodes at initial pharmD visit on 4/4/24. The patient was last seen by the pharmacist on 1/2/25. At last visit no medication changes were made to DM regimen.     Interval history since last pharmD visit:  Last visit with, Dr. Nuñez, PCP was on 1/10/25  Continue to titrate Ozempic as tolerated every 4 weeks   Continue Farxiga and insulin 70/30, decrease PM dose to 10 units to avoid nocturnal hypoglycemia  The patient has not had any ED visits or hospitalizations due to diabetes since last pharmacist visit.

## 2025-03-19 ENCOUNTER — HOSPITAL ENCOUNTER (OUTPATIENT)
Age: 75
Discharge: HOME | End: 2025-03-19
Payer: MEDICARE

## 2025-03-19 DIAGNOSIS — E11.65 UNCONTROLLED TYPE 2 DIABETES MELLITUS WITH HYPERGLYCEMIA (HCC): ICD-10-CM

## 2025-03-19 LAB
CHOLEST SERPL-MCNC: 178 MG/DL (ref 0–199)
HDLC SERPL-MCNC: 50 MG/DL (ref 40–60)
LDL CHOLESTEROL: 102 MG/DL
TRIGL SERPL-MCNC: 131 MG/DL (ref 0–150)
VLDLC SERPL CALC-MCNC: 26 MG/DL

## 2025-03-19 PROCEDURE — 80061 LIPID PANEL: CPT

## 2025-03-19 PROCEDURE — 84154 ASSAY OF PSA FREE: CPT

## 2025-03-19 PROCEDURE — 36415 COLL VENOUS BLD VENIPUNCTURE: CPT

## 2025-03-20 ENCOUNTER — RESULTS FOLLOW-UP (OUTPATIENT)
Age: 75
End: 2025-03-20

## 2025-03-21 ENCOUNTER — RESULTS FOLLOW-UP (OUTPATIENT)
Dept: FAMILY MEDICINE CLINIC | Age: 75
End: 2025-03-21

## 2025-03-21 LAB
PROSTATE SPECIFIC ANTIGEN: 56.8 NG/ML (ref 0–4)
PSA FREE MFR SERPL: 11.6 %
PSA FREE SERPL-MCNC: 6.6 UG/L

## 2025-03-31 DIAGNOSIS — E78.5 HYPERLIPIDEMIA, UNSPECIFIED HYPERLIPIDEMIA TYPE: Primary | ICD-10-CM

## 2025-03-31 RX ORDER — ATORVASTATIN CALCIUM 80 MG/1
80 TABLET, FILM COATED ORAL DAILY
Qty: 30 TABLET | Refills: 1 | Status: SHIPPED | OUTPATIENT
Start: 2025-03-31

## 2025-04-01 ENCOUNTER — TELEPHONE (OUTPATIENT)
Dept: FAMILY MEDICINE CLINIC | Age: 75
End: 2025-04-01

## 2025-04-01 NOTE — TELEPHONE ENCOUNTER
Patient assistance Ozempic received.   Documented and placed in fridge for patient to pu    [phone] (home) [phone] (work)      Lm per phi that medication is here and ready for pu

## 2025-04-10 ENCOUNTER — OFFICE VISIT (OUTPATIENT)
Dept: FAMILY MEDICINE CLINIC | Age: 75
End: 2025-04-10
Payer: MEDICARE

## 2025-04-10 ENCOUNTER — PATIENT MESSAGE (OUTPATIENT)
Dept: FAMILY MEDICINE CLINIC | Age: 75
End: 2025-04-10

## 2025-04-10 ENCOUNTER — TELEPHONE (OUTPATIENT)
Dept: FAMILY MEDICINE CLINIC | Age: 75
End: 2025-04-10

## 2025-04-10 VITALS
DIASTOLIC BLOOD PRESSURE: 70 MMHG | OXYGEN SATURATION: 96 % | WEIGHT: 207.4 LBS | BODY MASS INDEX: 33.48 KG/M2 | SYSTOLIC BLOOD PRESSURE: 138 MMHG | HEART RATE: 70 BPM

## 2025-04-10 DIAGNOSIS — E11.69 DM TYPE 2 WITH DIABETIC DYSLIPIDEMIA (HCC): Primary | ICD-10-CM

## 2025-04-10 DIAGNOSIS — E66.9 TYPE 2 DIABETES MELLITUS WITH OBESITY (HCC): ICD-10-CM

## 2025-04-10 DIAGNOSIS — E11.69 TYPE 2 DIABETES MELLITUS WITH OBESITY (HCC): ICD-10-CM

## 2025-04-10 DIAGNOSIS — E78.5 DM TYPE 2 WITH DIABETIC DYSLIPIDEMIA (HCC): Primary | ICD-10-CM

## 2025-04-10 LAB — HBA1C MFR BLD: 6.9 %

## 2025-04-10 PROCEDURE — G2211 COMPLEX E/M VISIT ADD ON: HCPCS | Performed by: FAMILY MEDICINE

## 2025-04-10 PROCEDURE — 1036F TOBACCO NON-USER: CPT | Performed by: FAMILY MEDICINE

## 2025-04-10 PROCEDURE — 99214 OFFICE O/P EST MOD 30 MIN: CPT | Performed by: FAMILY MEDICINE

## 2025-04-10 PROCEDURE — 1123F ACP DISCUSS/DSCN MKR DOCD: CPT | Performed by: FAMILY MEDICINE

## 2025-04-10 PROCEDURE — 3044F HG A1C LEVEL LT 7.0%: CPT | Performed by: FAMILY MEDICINE

## 2025-04-10 PROCEDURE — 2022F DILAT RTA XM EVC RTNOPTHY: CPT | Performed by: FAMILY MEDICINE

## 2025-04-10 PROCEDURE — 3075F SYST BP GE 130 - 139MM HG: CPT | Performed by: FAMILY MEDICINE

## 2025-04-10 PROCEDURE — 83036 HEMOGLOBIN GLYCOSYLATED A1C: CPT | Performed by: FAMILY MEDICINE

## 2025-04-10 PROCEDURE — 1160F RVW MEDS BY RX/DR IN RCRD: CPT | Performed by: FAMILY MEDICINE

## 2025-04-10 PROCEDURE — G8417 CALC BMI ABV UP PARAM F/U: HCPCS | Performed by: FAMILY MEDICINE

## 2025-04-10 PROCEDURE — 1159F MED LIST DOCD IN RCRD: CPT | Performed by: FAMILY MEDICINE

## 2025-04-10 PROCEDURE — 3017F COLORECTAL CA SCREEN DOC REV: CPT | Performed by: FAMILY MEDICINE

## 2025-04-10 PROCEDURE — 3078F DIAST BP <80 MM HG: CPT | Performed by: FAMILY MEDICINE

## 2025-04-10 PROCEDURE — G8427 DOCREV CUR MEDS BY ELIG CLIN: HCPCS | Performed by: FAMILY MEDICINE

## 2025-04-10 NOTE — ASSESSMENT & PLAN NOTE
Chronic, not at goal (unstable), attempt to increase Ozempic as tolerated.     Orders:    POCT glycosylated hemoglobin (Hb A1C)

## 2025-04-10 NOTE — ASSESSMENT & PLAN NOTE
Both chronic, DM is now well controlled, dyslipidemia is mildly uncontrolled.   - A1c level today is 6.9, indicating effective management with Ozempic 0.25 mg and 0.5 mg.  - Experiencing adverse effects at the increased dosage of 1 mg.  - Advised to continue morning insulin administration, particularly if consuming food, and consider withholding the nighttime dose if experiencing early hypoglycemia.  - Encouraged to space out injections and monitor effects; reduction to 0.5 mg will be considered if adverse effects   - LDL cholesterol level is currently 102.  - Advised to resume fish oil supplementation and continue with statin medication.  - Follow-up cholesterol check to be scheduled after a few months to assess treatment effectiveness.persist.    Orders:    POCT glycosylated hemoglobin (Hb A1C)

## 2025-04-10 NOTE — TELEPHONE ENCOUNTER
Patient seen PCP today 4/10/2025 for DM follow up. Patient had Pt assistance here to  as well. Patient did not want the 2mg as this was making him ill, he stated per PCP to stay on the 1MG. Patient stated that we can keep the 2mg and give them to someone else. But he would need some more of the 1mg. Will route to Sally Callejas, for assistance

## 2025-04-10 NOTE — PROGRESS NOTES
12     LV EDV Index A2C 03/13/2025 32     LVIDd Index 03/13/2025 2.22     LVIDs Index 03/13/2025 1.33     LV RWT Ratio 03/13/2025 0.49     LV Mass 2D 03/13/2025 186.4     LV Mass 2D Index 03/13/2025 91.8     MV E/A 03/13/2025 0.73     E/E' Ratio (Averaged) 03/13/2025 12.69     E/E' Lateral 03/13/2025 10.79     E/E' Septal 03/13/2025 14.59     LA Volume Index BP 03/13/2025 28     LA Volume Index MOD A2C 03/13/2025 25     LA Volume Index MOD A4C 03/13/2025 30     RA Volume Index A4C 03/13/2025 13     Ao Root Index 03/13/2025 1.58     Ascending Aorta Index 03/13/2025 1.67     AV Velocity Ratio 03/13/2025 0.71     LVOT:AV VTI Index 03/13/2025 0.79     MV:LVOT VTI Index 03/13/2025 1.24     Est. RA Pressure 03/13/2025 3     EF Physician 03/13/2025 58              The patient (or guardian, if applicable) and other individuals in attendance with the patient were advised that Artificial Intelligence will be utilized during this visit to record, process the conversation to generate a clinical note, and support improvement of the AI technology. The patient (or guardian, if applicable) and other individuals in attendance at the appointment consented to the use of AI, including the recording.      An electronic signature was used to authenticate this note.    --Lizzy Nuñez MD

## 2025-04-16 NOTE — TELEPHONE ENCOUNTER
Pat, wife, notified the 1 mg dose has been sent to [email or web address].    Advised the office will call them once the 1 mg dose arrives.

## 2025-04-16 NOTE — TELEPHONE ENCOUNTER
Faxed.    Left message for patient to call the office.    Please let patient know the 1 mg dose was sent to [facility].

## 2025-04-23 ENCOUNTER — HOSPITAL ENCOUNTER (OUTPATIENT)
Age: 75
Discharge: HOME | End: 2025-04-23
Payer: MEDICARE

## 2025-04-23 LAB
ALBUMIN SERPL-MCNC: 4.2 G/DL (ref 3.4–5)
ANION GAP SERPL CALCULATED.3IONS-SCNC: 11 MMOL/L (ref 3–16)
BACTERIA URNS QL MICRO: ABNORMAL /HPF
BILIRUB UR QL STRIP.AUTO: NEGATIVE
BUN SERPL-MCNC: 21 MG/DL (ref 7–20)
CALCIUM SERPL-MCNC: 9.5 MG/DL (ref 8.3–10.6)
CHLORIDE SERPL-SCNC: 104 MMOL/L (ref 99–110)
CLARITY UR: CLEAR
CO2 SERPL-SCNC: 25 MMOL/L (ref 21–32)
COLOR UR: YELLOW
CREAT SERPL-MCNC: 1.4 MG/DL (ref 0.8–1.3)
CREAT UR-MCNC: 78.9 MG/DL (ref 39–259)
EPI CELLS #/AREA URNS HPF: ABNORMAL /HPF (ref 0–5)
GFR SERPLBLD CREATININE-BSD FMLA CKD-EPI: 52 ML/MIN/{1.73_M2}
GLUCOSE SERPL-MCNC: 109 MG/DL (ref 70–99)
GLUCOSE UR STRIP.AUTO-MCNC: >=1000 MG/DL
HGB UR QL STRIP.AUTO: NEGATIVE
KETONES UR STRIP.AUTO-MCNC: NEGATIVE MG/DL
LEUKOCYTE ESTERASE UR QL STRIP.AUTO: NEGATIVE
MAGNESIUM SERPL-MCNC: 2 MG/DL (ref 1.8–2.4)
MICROALBUMIN UR DL<=1MG/L-MCNC: 33.1 MG/DL
MICROALBUMIN/CREAT UR: 419.5 MG/G (ref 0–30)
MUCOUS THREADS #/AREA URNS LPF: ABNORMAL /LPF
NITRITE UR QL STRIP.AUTO: NEGATIVE
PH UR STRIP.AUTO: 6 [PH] (ref 5–8)
PHOSPHATE SERPL-MCNC: 3.1 MG/DL (ref 2.5–4.9)
POTASSIUM SERPL-SCNC: 4 MMOL/L (ref 3.5–5.1)
PROT UR STRIP.AUTO-MCNC: 30 MG/DL
PROT UR-MCNC: 54.7 MG/DL
PROT/CREAT UR-RTO: 0.7 MG/DL
PTH-INTACT SERPL-MCNC: 47.4 PG/ML (ref 14–72)
RBC #/AREA URNS HPF: ABNORMAL /HPF (ref 0–4)
SODIUM SERPL-SCNC: 140 MMOL/L (ref 136–145)
SP GR UR STRIP.AUTO: 1.02 (ref 1–1.03)
UA DIPSTICK W REFLEX MICRO PNL UR: YES
URN SPEC COLLECT METH UR: ABNORMAL
UROBILINOGEN UR STRIP-ACNC: 0.2 E.U./DL
WBC #/AREA URNS HPF: ABNORMAL /HPF (ref 0–5)

## 2025-04-23 PROCEDURE — 81001 URINALYSIS AUTO W/SCOPE: CPT

## 2025-04-23 PROCEDURE — 83970 ASSAY OF PARATHORMONE: CPT

## 2025-04-23 PROCEDURE — 82570 ASSAY OF URINE CREATININE: CPT

## 2025-04-23 PROCEDURE — 83735 ASSAY OF MAGNESIUM: CPT

## 2025-04-23 PROCEDURE — 82043 UR ALBUMIN QUANTITATIVE: CPT

## 2025-04-23 PROCEDURE — 84156 ASSAY OF PROTEIN URINE: CPT

## 2025-04-23 PROCEDURE — 80069 RENAL FUNCTION PANEL: CPT

## 2025-04-23 PROCEDURE — 36415 COLL VENOUS BLD VENIPUNCTURE: CPT

## 2025-04-25 ENCOUNTER — PATIENT MESSAGE (OUTPATIENT)
Dept: FAMILY MEDICINE CLINIC | Age: 75
End: 2025-04-25

## 2025-04-25 DIAGNOSIS — Z12.11 COLON CANCER SCREENING: Primary | ICD-10-CM

## 2025-04-26 DIAGNOSIS — E11.9 DIABETES MELLITUS WITH COINCIDENT HYPERTENSION (HCC): ICD-10-CM

## 2025-04-26 DIAGNOSIS — E11.649 UNCONTROLLED TYPE 2 DIABETES MELLITUS WITH HYPOGLYCEMIA WITHOUT COMA (HCC): ICD-10-CM

## 2025-04-26 DIAGNOSIS — E11.21 DIABETIC NEPHROPATHY ASSOCIATED WITH TYPE 2 DIABETES MELLITUS (HCC): ICD-10-CM

## 2025-04-26 DIAGNOSIS — I10 DIABETES MELLITUS WITH COINCIDENT HYPERTENSION (HCC): ICD-10-CM

## 2025-04-26 DIAGNOSIS — E11.65 UNCONTROLLED TYPE 2 DIABETES MELLITUS WITH HYPERGLYCEMIA (HCC): ICD-10-CM

## 2025-04-26 DIAGNOSIS — Z79.4 UNCONTROLLED TYPE 2 DIABETES MELLITUS WITH HYPERGLYCEMIA, WITH LONG-TERM CURRENT USE OF INSULIN (HCC): ICD-10-CM

## 2025-04-26 DIAGNOSIS — E11.65 UNCONTROLLED TYPE 2 DIABETES MELLITUS WITH HYPERGLYCEMIA, WITH LONG-TERM CURRENT USE OF INSULIN (HCC): ICD-10-CM

## 2025-04-28 RX ORDER — LOSARTAN POTASSIUM 25 MG/1
TABLET ORAL
Qty: 360 TABLET | Refills: 1 | Status: SHIPPED | OUTPATIENT
Start: 2025-04-28

## 2025-04-28 NOTE — TELEPHONE ENCOUNTER
Refill Request     CONFIRM preferred pharmacy with the patient.    If Mail Order Rx - Pend for 90 day refill.      Last Seen: Last Seen Department: 4/10/2025  Last Seen by PCP: 4/10/2025    Last Written: 7/9/2024 360 tab 1 refills    If no future appointment scheduled:  Review the last OV with PCP and review information for follow-up visit,  Route STAFF MESSAGE with patient name to the  Pool for scheduling with the following information:            -  Timing of next visit           -  Visit type ie Physical, OV, etc           -  Diagnoses/Reason ie. COPD, HTN - Do not use MEDICATION, Follow-up or CHECK UP - Give reason for visit      Next Appointment:   Future Appointments   Date Time Provider Department Center   6/3/2025  9:20 AM Ina Matos MD [facility]   6/19/2025  5:00 PM EFM - [facility] DE[facility]   7/10/2025 11:30 AM Lizzy Nuñez MD [facility]   9/9/2025 10:00 AM Corey Szymanski DO [facility]       Message sent to  to schedule appt with patient?  NO      Requested Prescriptions     Pending Prescriptions Disp Refills    losartan (COZAAR) 25 MG tablet 360 tablet 1     Sig: TAKE 2 TABLETS BY MOUTH 2 TIMES A DAY

## 2025-05-08 ENCOUNTER — TELEPHONE (OUTPATIENT)
Dept: FAMILY MEDICINE CLINIC | Age: 75
End: 2025-05-08

## 2025-05-08 NOTE — TELEPHONE ENCOUNTER
He states this is not the same as a PSA , I read to the patient what it says about the test.  I advised him he has been told by both favio and Urology that he needs a bx. 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  Patient states nevermind. He states he will not get a bx.

## 2025-05-08 NOTE — TELEPHONE ENCOUNTER
Patient is dropping off [employer] PSE to see if he is a candidate for it. Patient would like to be called about it. Thank you.

## 2025-05-09 NOTE — TELEPHONE ENCOUNTER
Given results of free and total PSA, I would recommend discussing if the PSE test with urology to see if it is an appropriate next step or an unnecessary test.

## 2025-05-17 ENCOUNTER — PATIENT MESSAGE (OUTPATIENT)
Dept: FAMILY MEDICINE CLINIC | Age: 75
End: 2025-05-17

## 2025-05-17 DIAGNOSIS — B35.1 ONYCHOMYCOSIS: Primary | ICD-10-CM

## 2025-05-19 NOTE — TELEPHONE ENCOUNTER
Spoke with Pat, wife.  Reports patient has toenail fungus and with him being diabetic he needs to be seen by podiatry.    Referral pended.

## 2025-05-22 ENCOUNTER — RESULTS FOLLOW-UP (OUTPATIENT)
Dept: FAMILY MEDICINE CLINIC | Age: 75
End: 2025-05-22

## 2025-05-22 DIAGNOSIS — Z12.11 COLON CANCER SCREENING: Primary | ICD-10-CM

## 2025-05-22 LAB — NONINV COLON CA DNA+OCC BLD SCRN STL QL: NORMAL

## 2025-05-24 DIAGNOSIS — E11.649 UNCONTROLLED TYPE 2 DIABETES MELLITUS WITH HYPOGLYCEMIA WITHOUT COMA (HCC): ICD-10-CM

## 2025-05-30 DIAGNOSIS — E78.5 HYPERLIPIDEMIA, UNSPECIFIED HYPERLIPIDEMIA TYPE: ICD-10-CM

## 2025-05-31 ENCOUNTER — RESULTS FOLLOW-UP (OUTPATIENT)
Dept: FAMILY MEDICINE CLINIC | Age: 75
End: 2025-05-31

## 2025-05-31 LAB
CHOLEST SERPL-MCNC: 166 MG/DL (ref 0–199)
HDLC SERPL-MCNC: 63 MG/DL (ref 40–60)
LDLC SERPL CALC-MCNC: 78 MG/DL
TRIGL SERPL-MCNC: 124 MG/DL (ref 0–150)
VLDLC SERPL CALC-MCNC: 25 MG/DL

## 2025-06-03 ENCOUNTER — OFFICE VISIT (OUTPATIENT)
Dept: PULMONOLOGY | Age: 75
End: 2025-06-03
Payer: MEDICARE

## 2025-06-03 VITALS
SYSTOLIC BLOOD PRESSURE: 142 MMHG | HEART RATE: 67 BPM | BODY MASS INDEX: 33.2 KG/M2 | OXYGEN SATURATION: 98 % | TEMPERATURE: 97.9 F | RESPIRATION RATE: 16 BRPM | HEIGHT: 66 IN | DIASTOLIC BLOOD PRESSURE: 65 MMHG | WEIGHT: 206.6 LBS

## 2025-06-03 DIAGNOSIS — K21.9 CHRONIC GASTROESOPHAGEAL REFLUX DISEASE: Primary | ICD-10-CM

## 2025-06-03 DIAGNOSIS — R05.3 CHRONIC COUGH: ICD-10-CM

## 2025-06-03 PROCEDURE — 1159F MED LIST DOCD IN RCRD: CPT | Performed by: INTERNAL MEDICINE

## 2025-06-03 PROCEDURE — 3078F DIAST BP <80 MM HG: CPT | Performed by: INTERNAL MEDICINE

## 2025-06-03 PROCEDURE — 3017F COLORECTAL CA SCREEN DOC REV: CPT | Performed by: INTERNAL MEDICINE

## 2025-06-03 PROCEDURE — G8417 CALC BMI ABV UP PARAM F/U: HCPCS | Performed by: INTERNAL MEDICINE

## 2025-06-03 PROCEDURE — G2211 COMPLEX E/M VISIT ADD ON: HCPCS | Performed by: INTERNAL MEDICINE

## 2025-06-03 PROCEDURE — 99204 OFFICE O/P NEW MOD 45 MIN: CPT | Performed by: INTERNAL MEDICINE

## 2025-06-03 PROCEDURE — 1036F TOBACCO NON-USER: CPT | Performed by: INTERNAL MEDICINE

## 2025-06-03 PROCEDURE — 1123F ACP DISCUSS/DSCN MKR DOCD: CPT | Performed by: INTERNAL MEDICINE

## 2025-06-03 PROCEDURE — 3077F SYST BP >= 140 MM HG: CPT | Performed by: INTERNAL MEDICINE

## 2025-06-03 PROCEDURE — G8427 DOCREV CUR MEDS BY ELIG CLIN: HCPCS | Performed by: INTERNAL MEDICINE

## 2025-06-03 NOTE — PATIENT INSTRUCTIONS
Reviewed the PFTs, chest x-ray findings with the patient and his wife and reassured them  Discussed causes of chronic cough and phlegm production.  Patient is non-smoker and lisinopril stopped a long time ago  Acid reflux precautions discussed.  Continue famotidine and adjustable bed/head of bed elevation  GI referral due to persistent acid reflux symptoms for more than 20 years to consider EGD  Sinus rinse sample provided to do daily before Flonase  If patient continues to have productive cough in 3 months we will consider high-resolution CT chest and an inhaler with low-dose steroid.  Patient has not been requiring to use nebulizers  Follow-up in 3 months      Health Maintenance/Preventive measures:        >>  Avoid smoking, vaping or secondhand exposure.  Avoid exposure to irritants, allergens as possible as well as contact with patients with infectious respiratory illness.        >>  Stay up-to-date with influenza & pneumonia vaccines, RSV, & COVID-19 vaccine as recommended by the Advisory Committee on Immunization Practices (ACIP)        >>  Healthy diet and activity as able.        >>  Acid reflux precautions: Head of bed elevation, avoiding tight clothes, avoiding big meals or snacking 3 hours before bedtime, targeting healthy weight.        >>  Practice sleep hygiene measures. Avoid driving or operating heavy machines if tired or sleepy.

## 2025-06-03 NOTE — PROGRESS NOTES
PULMONARY NOTE      Nithin Thapa   : 1950  MRN: [id number]     Date of Service: 6/3/2025    PCP: Lizzy Nuñez MD    Referring provider: Kapil Cheatham APRN*      Chief Complaint   Patient presents with    New Patient     Abnormal PFT           ASSESSMENT & PLAN       74 y.o. pleasant  male patient with:    Assessment:  Chronic cough and congestion.  Likely multifactorial including chronic sinus congestion/airway cough syndrome, chronic acid reflux.  Lisinopril stopped due to patient 14.  Non-smoker.  Clear chest x-ray.  No obstruction on PFTs.  Long history of GERD.  More than 20 to 30 years.  Stopped Prilosec with concerns about side effects.  On famotidine and baking soda with persistent symptoms.  Adjustable bed currently  Borderline reduced DLCO.  Normal echo  Lifelong non-smoker  Obesity class I    Plan:              Reviewed the PFTs, chest x-ray findings with the patient and his wife and reassured them  Discussed causes of chronic cough and phlegm production.  Patient is non-smoker and lisinopril stopped a long time ago  Acid reflux precautions discussed.  Continue famotidine and adjustable bed/head of bed elevation  GI referral due to persistent acid reflux symptoms for more than 20 years to consider EGD  Sinus rinse sample provided to do daily before Flonase  If patient continues to have productive cough in 3 months we will consider high-resolution CT chest and an inhaler with low-dose steroid.  Patient has not been requiring to use nebulizers  Follow-up in 3 months    I spent total of 40 minutes on this encounter on physical exam, chart review, interpreting labs and images, coordinating care, medical documentation and counseling the patient on diagnosis listed above.     Subjective/Objective       HPI (Encounter: Nicky 3, 2025):   74-year-old male patient with PMH of HLD, DM2, wheezing, GERD, hearing loss who comes in for evaluation of abnormal PFTs.    Patient has been struggling with

## 2025-06-03 NOTE — PROGRESS NOTES
MA Communication:  The following orders are received by verbal communication from Ina Matos MD    Orders include:    3 month

## 2025-06-13 LAB — NONINV COLON CA DNA+OCC BLD SCRN STL QL: NEGATIVE

## 2025-06-17 ENCOUNTER — PATIENT MESSAGE (OUTPATIENT)
Dept: FAMILY MEDICINE CLINIC | Age: 75
End: 2025-06-17

## 2025-06-17 ENCOUNTER — OFFICE VISIT (OUTPATIENT)
Dept: FAMILY MEDICINE CLINIC | Age: 75
End: 2025-06-17

## 2025-06-17 VITALS
HEIGHT: 66 IN | SYSTOLIC BLOOD PRESSURE: 110 MMHG | DIASTOLIC BLOOD PRESSURE: 60 MMHG | HEART RATE: 70 BPM | WEIGHT: 207.4 LBS | BODY MASS INDEX: 33.33 KG/M2 | TEMPERATURE: 97.3 F | OXYGEN SATURATION: 97 %
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
## 2025-06-17 DIAGNOSIS — S20.469A TICK BITE OF BACK WALL OF THORAX, UNSPECIFIED LOCATION, INITIAL ENCOUNTER: Primary | ICD-10-CM

## 2025-06-17 DIAGNOSIS — S60.10XA SUBUNGUAL HEMATOMA OF DIGIT OF HAND, INITIAL ENCOUNTER: ICD-10-CM

## 2025-06-18 ENCOUNTER — TELEPHONE (OUTPATIENT)
Dept: FAMILY MEDICINE CLINIC | Age: 75
End: 2025-06-18

## 2025-06-18 NOTE — TELEPHONE ENCOUNTER
Nurse placed a call out to the patient to cancel and reschedule his diabetes mariel pharmacy appointment on 06/19/25 due to the provider having a medical emergency.  Patient called and rescheduled for 08/07/25 at 3:30 pm.

## 2025-06-21 NOTE — PROGRESS NOTES
Nithin Thapa (:  1950) is a 74 y.o. male established patient, here for evaluation of the following chief complaint(s):  Chief Complaint   Patient presents with    Finger Pain     Right hand   Middle and ring finger   Shut in the sliding glass door       Tick Removal     Wife removed 8 off of him.  3 were attached   Fatigue Saturday     Denies bullseye, flu-like symptoms like fever, chills, headache, muscle and joint aches, and swollen lymph nodes.        Assessment & Plan  Tick bite of back wall of thorax, unspecified location, initial encounter   Acute condition, new, plan:  - Multiple ticks attached for approximately 1 to 1.5 hours  - Risk of Lyme disease is low as ticks need to be attached for at least 48 hours to transmit the disease  - Advised to monitor for any signs of infection or Lyme disease and to contact the clinic if symptoms develop       Subungual hematoma of digit of hand, initial encounter   Acute condition, new, plan:  - Sustained finger injury when caught in a sliding door  - Blood pocket under the nail, nail not expected to fall off  - Advised to use a finger splint to protect the injured finger  - Recommended against drilling into the nail as the blood pocket is not large enough to warrant this              Nithin Thapa is a 74 y.o. male here today for had concerns including Finger Pain (Right hand /Middle and ring finger /Shut in the sliding glass door /) and Tick Removal (Wife removed 8 off of him./3 were attached /Fatigue Saturday //Denies bullseye, flu-like symptoms like fever, chills, headache, muscle and joint aches, and swollen lymph nodes. ).    History of Present Illness  The patient presents for evaluation of tick bites, finger injury.    He was exposed to ticks during a beekeeping activity in [place], where he discovered 2 ticks attached to his head and several others around his abdomen. The ticks were promptly removed, and it is estimated that they were

## 2025-06-26 ENCOUNTER — TELEPHONE (OUTPATIENT)
Dept: FAMILY MEDICINE CLINIC | Age: 75
End: 2025-06-26

## 2025-06-26 NOTE — TELEPHONE ENCOUNTER
Patient's wife stated patient was paying $35 for insulin and asked if it was available through PAP.  Advised it is available through PAP, it would just require a new application.     Do you want to keep patient on Novolin 70/30 vial?    Other options are Novolog, Novolog 70/30.    Please advise.

## 2025-07-18 ENCOUNTER — PATIENT MESSAGE (OUTPATIENT)
Dept: FAMILY MEDICINE CLINIC | Age: 75
End: 2025-07-18

## 2025-07-18 NOTE — TELEPHONE ENCOUNTER
Spoke with Zach at [facility].  Order was received and will be shipped in 10-14 business days.    Waiting on shipment.

## 2025-07-19 ENCOUNTER — PATIENT MESSAGE (OUTPATIENT)
Dept: FAMILY MEDICINE CLINIC | Age: 75
End: 2025-07-19

## 2025-07-21 RX ORDER — ETODOLAC 300 MG/1
300 CAPSULE ORAL EVERY 8 HOURS PRN
Qty: 90 CAPSULE | Refills: 0 | Status: SHIPPED | OUTPATIENT
Start: 2025-07-21

## 2025-07-21 NOTE — TELEPHONE ENCOUNTER
Refill Request     CONFIRM preferred pharmacy with the patient.    If Mail Order Rx - Pend for 90 day refill.      Last Seen: Last Seen Department: 6/17/2025  Last Seen by PCP: 6/17/2025    Last Written: 3/9/2021    If no future appointment scheduled:  Review the last OV with PCP and review information for follow-up visit,  Route STAFF MESSAGE with patient name to the  Pool for scheduling with the following information:            -  Timing of next visit           -  Visit type ie Physical, OV, etc           -  Diagnoses/Reason ie. COPD, HTN - Do not use MEDICATION, Follow-up or CHECK UP - Give reason for visit      Next Appointment:   Future Appointments   Date Time Provider Department Center   7/29/2025 11:00 AM Lizzy Nuñez MD [facility] DEP   8/7/2025  3:30 PM EFM - [facility] DE[facility]   9/10/2025 10:20 AM Ina Matos MD [facility]   9/15/2025 10:30 AM Corey Szymanski DO [facility]       Message sent to  to schedule appt with patient?  NO      Requested Prescriptions     Pending Prescriptions Disp Refills    etodolac (LODINE) 300 MG capsule 90 capsule 3     Sig: Take 1 capsule by mouth in the morning and 1 capsule at noon and 1 capsule in the evening.

## 2025-07-23 RX ORDER — TADALAFIL 5 MG/1
5 TABLET ORAL DAILY
Qty: 90 TABLET | Refills: 3 | Status: SHIPPED | OUTPATIENT
Start: 2025-07-23

## 2025-07-23 NOTE — TELEPHONE ENCOUNTER
Refill Request     CONFIRM preferred pharmacy with the patient.    If Mail Order Rx - Pend for 90 day refill.      Last Seen: Last Seen Department: 6/17/2025  Last Seen by PCP: 6/17/2025    Last Written: 7/3/24 90 with 3 refills     If no future appointment scheduled:  Review the last OV with PCP and review information for follow-up visit,  Route STAFF MESSAGE with patient name to the  Pool for scheduling with the following information:            -  Timing of next visit           -  Visit type ie Physical, OV, etc           -  Diagnoses/Reason ie. COPD, HTN - Do not use MEDICATION, Follow-up or CHECK UP - Give reason for visit      Next Appointment:   Future Appointments   Date Time Provider Department Center   7/29/2025 11:00 AM Lizzy Nuñez MD [facility] DEP   8/7/2025  3:30 PM EFM - [facility] DEP   9/10/2025 10:20 AM Ina Matos MD [facility]   9/15/2025 10:30 AM Corey Szymanski DO [facility]       Message sent to  to schedule appt with patient?  NO      Requested Prescriptions     Pending Prescriptions Disp Refills    tadalafil (CIALIS) 5 MG tablet 90 tablet 3     Sig: Take 1 tablet by mouth daily

## 2025-07-29 ENCOUNTER — OFFICE VISIT (OUTPATIENT)
Dept: FAMILY MEDICINE CLINIC | Age: 75
End: 2025-07-29

## 2025-07-29 VITALS
OXYGEN SATURATION: 97 % | HEIGHT: 66 IN | WEIGHT: 206.6 LBS | SYSTOLIC BLOOD PRESSURE: 130 MMHG | BODY MASS INDEX: 33.2 KG/M2 | TEMPERATURE: 97.3 F | HEART RATE: 71 BPM | DIASTOLIC BLOOD PRESSURE: 78 MMHG

## 2025-07-29 DIAGNOSIS — E66.9 TYPE 2 DIABETES MELLITUS WITH OBESITY (HCC): ICD-10-CM

## 2025-07-29 DIAGNOSIS — I10 DIABETES MELLITUS WITH COINCIDENT HYPERTENSION (HCC): ICD-10-CM

## 2025-07-29 DIAGNOSIS — E11.69 DM TYPE 2 WITH DIABETIC DYSLIPIDEMIA (HCC): ICD-10-CM

## 2025-07-29 DIAGNOSIS — E78.5 DM TYPE 2 WITH DIABETIC DYSLIPIDEMIA (HCC): ICD-10-CM

## 2025-07-29 DIAGNOSIS — K59.03 DRUG-INDUCED CONSTIPATION: ICD-10-CM

## 2025-07-29 DIAGNOSIS — S99.922A INJURY OF TOENAIL OF LEFT FOOT, INITIAL ENCOUNTER: ICD-10-CM

## 2025-07-29 DIAGNOSIS — E11.69 TYPE 2 DIABETES MELLITUS WITH OBESITY (HCC): ICD-10-CM

## 2025-07-29 DIAGNOSIS — E11.65 UNCONTROLLED TYPE 2 DIABETES MELLITUS WITH HYPERGLYCEMIA (HCC): Primary | ICD-10-CM

## 2025-07-29 DIAGNOSIS — E11.9 DIABETES MELLITUS WITH COINCIDENT HYPERTENSION (HCC): ICD-10-CM

## 2025-07-29 LAB — HBA1C MFR BLD: 7.4 %

## 2025-07-29 SDOH — ECONOMIC STABILITY: FOOD INSECURITY: WITHIN THE PAST 12 MONTHS, THE FOOD YOU BOUGHT JUST DIDN'T LAST AND YOU DIDN'T HAVE MONEY TO GET MORE.: NEVER TRUE

## 2025-07-29 SDOH — ECONOMIC STABILITY: FOOD INSECURITY: WITHIN THE PAST 12 MONTHS, YOU WORRIED THAT YOUR FOOD WOULD RUN OUT BEFORE YOU GOT MONEY TO BUY MORE.: NEVER TRUE

## 2025-07-29 NOTE — PROGRESS NOTES
mouth daily Patients wife states 1-2 a week) 180 tablet 1    dapagliflozin (FARXIGA) 10 MG tablet Take 1 tablet by mouth every morning      diclofenac sodium (VOLTAREN) 1 % GEL Apply 4 g topically 4 times daily as needed for Pain 100 g 3    triamcinolone (KENALOG) 0.1 % cream Apply topically 2 times daily. 45 g 2    Handicap Placard MISC by Does not apply route 1 each 0    fluticasone (FLONASE) 50 MCG/ACT nasal spray Use 1 spray(s) in each nostril once daily 48 g 3    Alcohol Swabs (ALCOHOL PADS) 70 % PADS 1 each by Does not apply route 2 times daily 100 each 5    ciclopirox (PENLAC) 8 % solution Apply topically nightly. 3 Bottle 0    Multiple Vitamins-Minerals (THERAPEUTIC MULTIVITAMIN-MINERALS) tablet Take 1 tablet by mouth daily      albuterol sulfate (PROAIR RESPICLICK) 108 (90 BASE) MCG/ACT aerosol powder inhalation Inhale 1-2 puffs into the lungs every 4 hours as needed for Wheezing or Shortness of Breath 1 Inhaler 0    aspirin 81 MG chewable tablet Take 1 tablet by mouth daily OTC      fish oil-omega-3 fatty acids 1000 MG capsule Take 2 capsules by mouth daily OTC      Semaglutide, 1 MG/DOSE, (OZEMPIC, 1 MG/DOSE,) 4 MG/3ML SOPN sc injection Inject 1 mg into the skin every 7 days for 28 days (Patient not taking: Reported on 6/3/2025) 3 mL 0     No current facility-administered medications for this visit.        Review of Systems - per HPI    Vitals:    07/29/25 1058   BP: 130/78   Pulse: 71   Temp: 97.3 °F (36.3 °C)   SpO2: 97%   Weight: 93.7 kg (206 lb 9.6 oz)   Height: 1.676 m (5' 6\")       Physical Exam  Vitals and nursing note reviewed.   Constitutional:       Appearance: Normal appearance. He is obese.   Pulmonary:      Effort: Pulmonary effort is normal.   Feet:      Comments: Avulsion injury to the left 5th toenail, nail is intact but bloody, not bruising noted of the toe  Neurological:      Mental Status: He is alert.   Psychiatric:         Speech: Speech normal.         Behavior: Behavior normal.

## 2025-07-29 NOTE — ASSESSMENT & PLAN NOTE
- Transition from Ozempic to Trulicity due to constipation leading to adherence issues (using every 12 days instead of 7), starting with a low dose and gradually increasing as tolerated  - Prescription for Trulicity will be provided; inform the office immediately if adverse effects occur    Orders:    POCT glycosylated hemoglobin (Hb A1C)

## 2025-07-30 NOTE — ASSESSMENT & PLAN NOTE
- A1c level has increased from 6.9 to 7.4, indicating a need for adjustment in diabetes management  - Goal is to maintain A1c below 7.5  - Transition from Ozempic to Trulicity due to constipation leading to adherence issues (using every 12 days instead of 7), starting with a low dose and gradually increasing as tolerated  - Prescription for Trulicity will be provided; inform the office immediately if adverse effects occur

## 2025-07-31 ENCOUNTER — TELEPHONE (OUTPATIENT)
Dept: FAMILY MEDICINE CLINIC | Age: 75
End: 2025-07-31

## 2025-08-07 ENCOUNTER — PHARMACY VISIT (OUTPATIENT)
Dept: FAMILY MEDICINE CLINIC | Age: 75
End: 2025-08-07

## 2025-08-07 DIAGNOSIS — E11.65 UNCONTROLLED TYPE 2 DIABETES MELLITUS WITH HYPERGLYCEMIA (HCC): Primary | ICD-10-CM

## 2025-08-07 RX ORDER — AMLODIPINE BESYLATE 10 MG/1
10 TABLET ORAL DAILY
COMMUNITY
Start: 2025-08-06

## 2025-08-13 ENCOUNTER — HOSPITAL ENCOUNTER (OUTPATIENT)
Dept: LAB | Age: 75
Discharge: HOME | End: 2025-08-13
Payer: MEDICARE

## 2025-08-13 LAB
25(OH)D3 SERPL-MCNC: 50.6 NG/ML
ALBUMIN SERPL-MCNC: 4.1 G/DL (ref 3.4–5)
ANION GAP SERPL CALCULATED.3IONS-SCNC: 9 MMOL/L (ref 3–16)
BACTERIA URNS QL MICRO: ABNORMAL /HPF
BILIRUB UR QL STRIP.AUTO: NEGATIVE
BUN SERPL-MCNC: 23 MG/DL (ref 7–20)
CALCIUM SERPL-MCNC: 9.4 MG/DL (ref 8.3–10.6)
CHLORIDE SERPL-SCNC: 101 MMOL/L (ref 99–110)
CLARITY UR: CLEAR
CO2 SERPL-SCNC: 28 MMOL/L (ref 21–32)
COLOR UR: YELLOW
CREAT SERPL-MCNC: 1.4 MG/DL (ref 0.8–1.3)
CREAT UR-MCNC: 72.1 MG/DL (ref 39–259)
DEPRECATED RDW RBC AUTO: 13.7 % (ref 12.4–15.4)
GFR SERPLBLD CREATININE-BSD FMLA CKD-EPI: 52 ML/MIN/{1.73_M2}
GLUCOSE SERPL-MCNC: 165 MG/DL (ref 70–99)
GLUCOSE UR STRIP.AUTO-MCNC: >=1000 MG/DL
HCT VFR BLD AUTO: 39.7 % (ref 40.5–52.5)
HGB BLD-MCNC: 13.7 G/DL (ref 13.5–17.5)
HGB UR QL STRIP.AUTO: NEGATIVE
KETONES UR STRIP.AUTO-MCNC: NEGATIVE MG/DL
LEUKOCYTE ESTERASE UR QL STRIP.AUTO: NEGATIVE
MCH RBC QN AUTO: 29.7 PG (ref 26–34)
MCHC RBC AUTO-ENTMCNC: 34.7 G/DL (ref 31–36)
MCV RBC AUTO: 85.7 FL (ref 80–100)
MICROALBUMIN UR DL<=1MG/L-MCNC: 21.5 MG/DL
MICROALBUMIN/CREAT UR: 298.2 MG/G (ref 0–30)
NITRITE UR QL STRIP.AUTO: NEGATIVE
PH UR STRIP.AUTO: 6.5 [PH] (ref 5–8)
PHOSPHATE SERPL-MCNC: 3.6 MG/DL (ref 2.5–4.9)
PLATELET # BLD AUTO: 258 K/UL (ref 135–450)
PMV BLD AUTO: 8.4 FL (ref 5–10.5)
POTASSIUM SERPL-SCNC: 4.3 MMOL/L (ref 3.5–5.1)
PROT UR STRIP.AUTO-MCNC: 30 MG/DL
PROT UR-MCNC: 37.5 MG/DL
PROT/CREAT UR-RTO: 0.5 MG/DL
PTH-INTACT SERPL-MCNC: 45.4 PG/ML (ref 14–72)
RBC # BLD AUTO: 4.63 M/UL (ref 4.2–5.9)
RBC #/AREA URNS HPF: ABNORMAL /HPF (ref 0–4)
SODIUM SERPL-SCNC: 138 MMOL/L (ref 136–145)
SP GR UR STRIP.AUTO: 1.01 (ref 1–1.03)
UA DIPSTICK W REFLEX MICRO PNL UR: YES
URN SPEC COLLECT METH UR: ABNORMAL
UROBILINOGEN UR STRIP-ACNC: 0.2 E.U./DL
WBC # BLD AUTO: 4.9 K/UL (ref 4–11)
WBC #/AREA URNS HPF: ABNORMAL /HPF (ref 0–5)

## 2025-08-13 PROCEDURE — 81001 URINALYSIS AUTO W/SCOPE: CPT

## 2025-08-13 PROCEDURE — 80069 RENAL FUNCTION PANEL: CPT

## 2025-08-13 PROCEDURE — 82570 ASSAY OF URINE CREATININE: CPT

## 2025-08-13 PROCEDURE — 82306 VITAMIN D 25 HYDROXY: CPT

## 2025-08-13 PROCEDURE — 85027 COMPLETE CBC AUTOMATED: CPT

## 2025-08-13 PROCEDURE — 82043 UR ALBUMIN QUANTITATIVE: CPT

## 2025-08-13 PROCEDURE — 83970 ASSAY OF PARATHORMONE: CPT

## 2025-08-13 PROCEDURE — 36415 COLL VENOUS BLD VENIPUNCTURE: CPT

## 2025-08-13 PROCEDURE — 84156 ASSAY OF PROTEIN URINE: CPT

## 2025-08-21 ENCOUNTER — TELEPHONE (OUTPATIENT)
Dept: FAMILY MEDICINE CLINIC | Age: 75
End: 2025-08-21

## 2025-08-27 DIAGNOSIS — E11.9 DIABETES MELLITUS WITH COINCIDENT HYPERTENSION (HCC): ICD-10-CM

## 2025-08-27 DIAGNOSIS — G89.29 CHRONIC MIDLINE LOW BACK PAIN, UNSPECIFIED WHETHER SCIATICA PRESENT: ICD-10-CM

## 2025-08-27 DIAGNOSIS — M54.50 CHRONIC MIDLINE LOW BACK PAIN, UNSPECIFIED WHETHER SCIATICA PRESENT: ICD-10-CM

## 2025-08-27 DIAGNOSIS — I10 DIABETES MELLITUS WITH COINCIDENT HYPERTENSION (HCC): ICD-10-CM

## 2025-08-27 DIAGNOSIS — E11.21 DIABETIC NEPHROPATHY ASSOCIATED WITH TYPE 2 DIABETES MELLITUS (HCC): ICD-10-CM

## 2025-08-27 DIAGNOSIS — E11.69 DM TYPE 2 WITH DIABETIC DYSLIPIDEMIA (HCC): ICD-10-CM

## 2025-08-27 DIAGNOSIS — E78.5 DM TYPE 2 WITH DIABETIC DYSLIPIDEMIA (HCC): ICD-10-CM

## 2025-08-27 RX ORDER — NAPROXEN SODIUM 220 MG
TABLET ORAL
Qty: 200 EACH | Refills: 5 | Status: CANCELLED | OUTPATIENT
Start: 2025-08-27

## 2025-08-29 ENCOUNTER — TELEPHONE (OUTPATIENT)
Dept: FAMILY MEDICINE CLINIC | Age: 75
End: 2025-08-29

## 2025-08-29 ENCOUNTER — PATIENT MESSAGE (OUTPATIENT)
Dept: FAMILY MEDICINE CLINIC | Age: 75
End: 2025-08-29

## 2025-08-29 DIAGNOSIS — I10 DIABETES MELLITUS WITH COINCIDENT HYPERTENSION (HCC): ICD-10-CM

## 2025-08-29 DIAGNOSIS — E11.69 DM TYPE 2 WITH DIABETIC DYSLIPIDEMIA (HCC): ICD-10-CM

## 2025-08-29 DIAGNOSIS — E11.9 DIABETES MELLITUS WITH COINCIDENT HYPERTENSION (HCC): ICD-10-CM

## 2025-08-29 DIAGNOSIS — E11.21 DIABETIC NEPHROPATHY ASSOCIATED WITH TYPE 2 DIABETES MELLITUS (HCC): ICD-10-CM

## 2025-08-29 DIAGNOSIS — E78.5 DM TYPE 2 WITH DIABETIC DYSLIPIDEMIA (HCC): ICD-10-CM

## 2025-08-29 RX ORDER — AVOBENZONE, HOMOSALATE, OCTISALATE, OCTOCRYLENE 30; 40; 45; 26 MG/ML; MG/ML; MG/ML; MG/ML
1 CREAM TOPICAL 3 TIMES DAILY
Qty: 300 EACH | Refills: 3 | Status: SHIPPED | OUTPATIENT
Start: 2025-08-29

## 2025-08-29 RX ORDER — GLUCOSAMINE HCL/CHONDROITIN SU 500-400 MG
1 CAPSULE ORAL
Qty: 300 STRIP | Refills: 3 | Status: SHIPPED | OUTPATIENT
Start: 2025-08-29

## 2025-09-01 RX ORDER — NAPROXEN SODIUM 220 MG
TABLET ORAL
Qty: 200 EACH | Refills: 5 | Status: SHIPPED | OUTPATIENT
Start: 2025-09-01

## 2025-09-03 ENCOUNTER — PATIENT MESSAGE (OUTPATIENT)
Dept: FAMILY MEDICINE CLINIC | Age: 75
End: 2025-09-03